# Patient Record
Sex: FEMALE | Race: BLACK OR AFRICAN AMERICAN | NOT HISPANIC OR LATINO | Employment: UNEMPLOYED | URBAN - METROPOLITAN AREA
[De-identification: names, ages, dates, MRNs, and addresses within clinical notes are randomized per-mention and may not be internally consistent; named-entity substitution may affect disease eponyms.]

---

## 2019-12-06 ENCOUNTER — LAB VISIT (OUTPATIENT)
Dept: LAB | Facility: OTHER | Age: 24
End: 2019-12-06
Attending: NURSE PRACTITIONER
Payer: MEDICAID

## 2019-12-06 ENCOUNTER — INITIAL PRENATAL (OUTPATIENT)
Dept: OBSTETRICS AND GYNECOLOGY | Facility: CLINIC | Age: 24
End: 2019-12-06
Payer: MEDICAID

## 2019-12-06 VITALS — WEIGHT: 175.94 LBS | SYSTOLIC BLOOD PRESSURE: 102 MMHG | BODY MASS INDEX: 30.2 KG/M2 | DIASTOLIC BLOOD PRESSURE: 70 MMHG

## 2019-12-06 DIAGNOSIS — Z34.82 ENCOUNTER FOR SUPERVISION OF OTHER NORMAL PREGNANCY, SECOND TRIMESTER: ICD-10-CM

## 2019-12-06 DIAGNOSIS — Z34.90 PREGNANCY, UNSPECIFIED GESTATIONAL AGE: ICD-10-CM

## 2019-12-06 DIAGNOSIS — O21.9 NAUSEA AND VOMITING IN PREGNANCY PRIOR TO 22 WEEKS GESTATION: ICD-10-CM

## 2019-12-06 DIAGNOSIS — Z98.891 PREVIOUS CESAREAN SECTION: Chronic | ICD-10-CM

## 2019-12-06 DIAGNOSIS — Z3A.18 18 WEEKS GESTATION OF PREGNANCY: Primary | ICD-10-CM

## 2019-12-06 DIAGNOSIS — Z3A.18 18 WEEKS GESTATION OF PREGNANCY: ICD-10-CM

## 2019-12-06 LAB
ABO + RH BLD: NORMAL
BASOPHILS # BLD AUTO: 0.03 K/UL (ref 0–0.2)
BASOPHILS NFR BLD: 0.3 % (ref 0–1.9)
BLD GP AB SCN CELLS X3 SERPL QL: NORMAL
DIFFERENTIAL METHOD: ABNORMAL
EOSINOPHIL # BLD AUTO: 0 K/UL (ref 0–0.5)
EOSINOPHIL NFR BLD: 0.3 % (ref 0–8)
ERYTHROCYTE [DISTWIDTH] IN BLOOD BY AUTOMATED COUNT: 19 % (ref 11.5–14.5)
GLUCOSE SERPL-MCNC: 73 MG/DL (ref 70–110)
HBV SURFACE AG SERPL QL IA: NEGATIVE
HCT VFR BLD AUTO: 30.5 % (ref 37–48.5)
HGB BLD-MCNC: 9.2 G/DL (ref 12–16)
HIV 1+2 AB+HIV1 P24 AG SERPL QL IA: NEGATIVE
IMM GRANULOCYTES # BLD AUTO: 0.04 K/UL (ref 0–0.04)
IMM GRANULOCYTES NFR BLD AUTO: 0.4 % (ref 0–0.5)
LYMPHOCYTES # BLD AUTO: 2.5 K/UL (ref 1–4.8)
LYMPHOCYTES NFR BLD: 24.3 % (ref 18–48)
MCH RBC QN AUTO: 23.6 PG (ref 27–31)
MCHC RBC AUTO-ENTMCNC: 30.2 G/DL (ref 32–36)
MCV RBC AUTO: 78 FL (ref 82–98)
MONOCYTES # BLD AUTO: 0.7 K/UL (ref 0.3–1)
MONOCYTES NFR BLD: 6.7 % (ref 4–15)
NEUTROPHILS # BLD AUTO: 7 K/UL (ref 1.8–7.7)
NEUTROPHILS NFR BLD: 68 % (ref 38–73)
NRBC BLD-RTO: 0 /100 WBC
PLATELET # BLD AUTO: 208 K/UL (ref 150–350)
PMV BLD AUTO: 12 FL (ref 9.2–12.9)
RBC # BLD AUTO: 3.9 M/UL (ref 4–5.4)
RH BLD: NORMAL
RPR SER QL: NORMAL
TSH SERPL DL<=0.005 MIU/L-ACNC: 1.9 UIU/ML (ref 0.4–4)
WBC # BLD AUTO: 10.27 K/UL (ref 3.9–12.7)

## 2019-12-06 PROCEDURE — 87086 URINE CULTURE/COLONY COUNT: CPT

## 2019-12-06 PROCEDURE — 85025 COMPLETE CBC W/AUTO DIFF WBC: CPT

## 2019-12-06 PROCEDURE — 86592 SYPHILIS TEST NON-TREP QUAL: CPT

## 2019-12-06 PROCEDURE — 86762 RUBELLA ANTIBODY: CPT

## 2019-12-06 PROCEDURE — 86703 HIV-1/HIV-2 1 RESULT ANTBDY: CPT

## 2019-12-06 PROCEDURE — 99202 OFFICE O/P NEW SF 15 MIN: CPT | Mod: PBBFAC,TH,25 | Performed by: NURSE PRACTITIONER

## 2019-12-06 PROCEDURE — 99202 PR OFFICE/OUTPT VISIT, NEW, LEVL II, 15-29 MIN: ICD-10-PCS | Mod: TH,S$PBB,, | Performed by: NURSE PRACTITIONER

## 2019-12-06 PROCEDURE — 82947 ASSAY GLUCOSE BLOOD QUANT: CPT

## 2019-12-06 PROCEDURE — 99999 PR PBB SHADOW E&M-NEW PATIENT-LVL II: ICD-10-PCS | Mod: PBBFAC,,, | Performed by: NURSE PRACTITIONER

## 2019-12-06 PROCEDURE — 84443 ASSAY THYROID STIM HORMONE: CPT

## 2019-12-06 PROCEDURE — 99202 OFFICE O/P NEW SF 15 MIN: CPT | Mod: TH,S$PBB,, | Performed by: NURSE PRACTITIONER

## 2019-12-06 PROCEDURE — 99999 PR PBB SHADOW E&M-NEW PATIENT-LVL II: CPT | Mod: PBBFAC,,, | Performed by: NURSE PRACTITIONER

## 2019-12-06 PROCEDURE — 86901 BLOOD TYPING SEROLOGIC RH(D): CPT

## 2019-12-06 PROCEDURE — 87491 CHLMYD TRACH DNA AMP PROBE: CPT

## 2019-12-06 PROCEDURE — 87340 HEPATITIS B SURFACE AG IA: CPT

## 2019-12-06 PROCEDURE — 86901 BLOOD TYPING SEROLOGIC RH(D): CPT | Mod: 91

## 2019-12-06 PROCEDURE — 36415 COLL VENOUS BLD VENIPUNCTURE: CPT

## 2019-12-06 RX ORDER — ONDANSETRON 4 MG/1
TABLET, ORALLY DISINTEGRATING ORAL
Refills: 0 | COMMUNITY
Start: 2019-10-18 | End: 2019-12-06 | Stop reason: SDUPTHER

## 2019-12-06 RX ORDER — ONDANSETRON 4 MG/1
TABLET, ORALLY DISINTEGRATING ORAL
Qty: 30 TABLET | Refills: 1 | Status: SHIPPED | OUTPATIENT
Start: 2019-12-06 | End: 2020-03-17

## 2019-12-06 NOTE — PROGRESS NOTES
Presents for first visit this pregnancy - pt of 's.  OB records provided, but burks not include initial OB labs - spoke w/ 's RN Nyla, and she states pt was given orders for labs twice, but pt never got labs done.  We provided pt with Initial OB lab orders today.    Requested refill of Zofran today.  Prev c/s - interested in discussing TOLAC with MD.  [facility] Anatomy scan orders placed today.

## 2019-12-07 LAB
C TRACH DNA SPEC QL NAA+PROBE: NOT DETECTED
N GONORRHOEA DNA SPEC QL NAA+PROBE: NOT DETECTED

## 2019-12-08 LAB — BACTERIA UR CULT: NORMAL

## 2019-12-09 LAB
RUBV IGG SER-ACNC: 44.4 IU/ML
RUBV IGG SER-IMP: REACTIVE

## 2019-12-10 ENCOUNTER — HOSPITAL ENCOUNTER (OUTPATIENT)
Facility: OTHER | Age: 24
LOS: 1 days | Discharge: HOME | End: 2019-12-10
Attending: OBSTETRICS & GYNECOLOGY | Admitting: OBSTETRICS & GYNECOLOGY
Payer: MEDICAID

## 2019-12-10 ENCOUNTER — ANESTHESIA (OUTPATIENT)
Dept: OBSTETRICS AND GYNECOLOGY | Facility: OTHER | Age: 24
End: 2019-12-10

## 2019-12-10 ENCOUNTER — ANESTHESIA EVENT (OUTPATIENT)
Dept: OBSTETRICS AND GYNECOLOGY | Facility: OTHER | Age: 24
End: 2019-12-10

## 2019-12-10 ENCOUNTER — HOSPITAL ENCOUNTER (EMERGENCY)
Facility: HOSPITAL | Age: 24
Discharge: STILL ADMITTED OR ADMINISTRATIVE | End: 2019-12-10
Attending: EMERGENCY MEDICINE
Payer: MEDICAID

## 2019-12-10 VITALS
WEIGHT: 170 LBS | OXYGEN SATURATION: 100 % | HEART RATE: 92 BPM | SYSTOLIC BLOOD PRESSURE: 120 MMHG | BODY MASS INDEX: 29.02 KG/M2 | RESPIRATION RATE: 16 BRPM | HEIGHT: 64 IN | TEMPERATURE: 98 F | DIASTOLIC BLOOD PRESSURE: 59 MMHG

## 2019-12-10 VITALS
SYSTOLIC BLOOD PRESSURE: 117 MMHG | DIASTOLIC BLOOD PRESSURE: 53 MMHG | TEMPERATURE: 99 F | OXYGEN SATURATION: 100 % | RESPIRATION RATE: 17 BRPM | HEART RATE: 81 BPM

## 2019-12-10 DIAGNOSIS — O03.9 SPONTANEOUS PREGNANCY LOSS: Primary | ICD-10-CM

## 2019-12-10 DIAGNOSIS — O03.9 SPONTANEOUS MISCARRIAGE: ICD-10-CM

## 2019-12-10 PROBLEM — Z67.91 RH NEGATIVE STATUS DURING PREGNANCY IN SECOND TRIMESTER: Status: ACTIVE | Noted: 2019-12-10

## 2019-12-10 PROBLEM — O26.892 RH NEGATIVE STATUS DURING PREGNANCY IN SECOND TRIMESTER: Status: ACTIVE | Noted: 2019-12-10

## 2019-12-10 LAB
ABO + RH BLD: NORMAL
ABO + RH BLD: NORMAL
ALBUMIN SERPL BCP-MCNC: 3.1 G/DL (ref 3.5–5.2)
ALP SERPL-CCNC: 89 U/L (ref 55–135)
ALT SERPL W/O P-5'-P-CCNC: 8 U/L (ref 10–44)
ANION GAP SERPL CALC-SCNC: 14 MMOL/L (ref 8–16)
APTT BLDCRRT: 23.4 SEC (ref 21–32)
AST SERPL-CCNC: 29 U/L (ref 10–40)
BASOPHILS # BLD AUTO: 0.04 K/UL (ref 0–0.2)
BASOPHILS NFR BLD: 0.3 % (ref 0–1.9)
BILIRUB SERPL-MCNC: 0.3 MG/DL (ref 0.1–1)
BLD GP AB SCN CELLS X3 SERPL QL: NORMAL
BLD GP AB SCN CELLS X3 SERPL QL: NORMAL
BUN SERPL-MCNC: 4 MG/DL (ref 6–20)
CALCIUM SERPL-MCNC: 8.8 MG/DL (ref 8.7–10.5)
CHLORIDE SERPL-SCNC: 106 MMOL/L (ref 95–110)
CO2 SERPL-SCNC: 17 MMOL/L (ref 23–29)
CREAT SERPL-MCNC: 0.6 MG/DL (ref 0.5–1.4)
DIFFERENTIAL METHOD: ABNORMAL
EOSINOPHIL # BLD AUTO: 0 K/UL (ref 0–0.5)
EOSINOPHIL NFR BLD: 0.1 % (ref 0–8)
ERYTHROCYTE [DISTWIDTH] IN BLOOD BY AUTOMATED COUNT: 18.7 % (ref 11.5–14.5)
EST. GFR  (AFRICAN AMERICAN): >60 ML/MIN/1.73 M^2
EST. GFR  (NON AFRICAN AMERICAN): >60 ML/MIN/1.73 M^2
FETAL CELL SCN BLD QL ROSETTE: NORMAL
GLUCOSE SERPL-MCNC: 108 MG/DL (ref 70–110)
HCT VFR BLD AUTO: 31.9 % (ref 37–48.5)
HGB BLD-MCNC: 10 G/DL (ref 12–16)
IMM GRANULOCYTES # BLD AUTO: 0.09 K/UL (ref 0–0.04)
IMM GRANULOCYTES NFR BLD AUTO: 0.6 % (ref 0–0.5)
INJECT RH IG VOL PATIENT: NORMAL ML
LYMPHOCYTES # BLD AUTO: 4.1 K/UL (ref 1–4.8)
LYMPHOCYTES NFR BLD: 27.2 % (ref 18–48)
MCH RBC QN AUTO: 24.9 PG (ref 27–31)
MCHC RBC AUTO-ENTMCNC: 31.3 G/DL (ref 32–36)
MCV RBC AUTO: 80 FL (ref 82–98)
MONOCYTES # BLD AUTO: 1.2 K/UL (ref 0.3–1)
MONOCYTES NFR BLD: 8.1 % (ref 4–15)
NEUTROPHILS # BLD AUTO: 9.6 K/UL (ref 1.8–7.7)
NEUTROPHILS NFR BLD: 63.7 % (ref 38–73)
NRBC BLD-RTO: 0 /100 WBC
PLATELET # BLD AUTO: 194 K/UL (ref 150–350)
PMV BLD AUTO: 11.2 FL (ref 9.2–12.9)
POTASSIUM SERPL-SCNC: 4 MMOL/L (ref 3.5–5.1)
PROT SERPL-MCNC: 7.9 G/DL (ref 6–8.4)
RBC # BLD AUTO: 4.01 M/UL (ref 4–5.4)
SODIUM SERPL-SCNC: 137 MMOL/L (ref 136–145)
WBC # BLD AUTO: 14.99 K/UL (ref 3.9–12.7)

## 2019-12-10 PROCEDURE — 63600175 PHARM REV CODE 636 W HCPCS: Performed by: STUDENT IN AN ORGANIZED HEALTH CARE EDUCATION/TRAINING PROGRAM

## 2019-12-10 PROCEDURE — 96372 THER/PROPH/DIAG INJ SC/IM: CPT

## 2019-12-10 PROCEDURE — 63600519 RHOGAM PHARM REV CODE 636 ALT 250 W HCPCS: Performed by: STUDENT IN AN ORGANIZED HEALTH CARE EDUCATION/TRAINING PROGRAM

## 2019-12-10 PROCEDURE — 99291 CRITICAL CARE FIRST HOUR: CPT | Mod: 25

## 2019-12-10 PROCEDURE — 36415 COLL VENOUS BLD VENIPUNCTURE: CPT

## 2019-12-10 PROCEDURE — 99291 CRITICAL CARE FIRST HOUR: CPT | Mod: ,,, | Performed by: EMERGENCY MEDICINE

## 2019-12-10 PROCEDURE — 86901 BLOOD TYPING SEROLOGIC RH(D): CPT

## 2019-12-10 PROCEDURE — 85461 HEMOGLOBIN FETAL: CPT

## 2019-12-10 PROCEDURE — 85730 THROMBOPLASTIN TIME PARTIAL: CPT

## 2019-12-10 PROCEDURE — 86850 RBC ANTIBODY SCREEN: CPT | Mod: 91

## 2019-12-10 PROCEDURE — 85025 COMPLETE CBC W/AUTO DIFF WBC: CPT

## 2019-12-10 PROCEDURE — G0378 HOSPITAL OBSERVATION PER HR: HCPCS

## 2019-12-10 PROCEDURE — 96376 TX/PRO/DX INJ SAME DRUG ADON: CPT

## 2019-12-10 PROCEDURE — 99291 PR CRITICAL CARE, E/M 30-74 MINUTES: ICD-10-PCS | Mod: ,,, | Performed by: EMERGENCY MEDICINE

## 2019-12-10 PROCEDURE — 96374 THER/PROPH/DIAG INJ IV PUSH: CPT

## 2019-12-10 PROCEDURE — 80053 COMPREHEN METABOLIC PANEL: CPT

## 2019-12-10 PROCEDURE — 96372 THER/PROPH/DIAG INJ SC/IM: CPT | Mod: 59

## 2019-12-10 PROCEDURE — 96375 TX/PRO/DX INJ NEW DRUG ADDON: CPT

## 2019-12-10 RX ORDER — SIMETHICONE 80 MG
1 TABLET,CHEWABLE ORAL EVERY 6 HOURS PRN
Status: DISCONTINUED | OUTPATIENT
Start: 2019-12-10 | End: 2019-12-10 | Stop reason: HOSPADM

## 2019-12-10 RX ORDER — AMOXICILLIN 250 MG
1 CAPSULE ORAL NIGHTLY PRN
Status: DISCONTINUED | OUTPATIENT
Start: 2019-12-10 | End: 2019-12-10 | Stop reason: HOSPADM

## 2019-12-10 RX ORDER — METHYLERGONOVINE MALEATE 0.2 MG/ML
200 INJECTION INTRAVENOUS ONCE
Status: COMPLETED | OUTPATIENT
Start: 2019-12-10 | End: 2019-12-10

## 2019-12-10 RX ORDER — FENTANYL CITRATE 50 UG/ML
INJECTION, SOLUTION INTRAMUSCULAR; INTRAVENOUS
Status: DISCONTINUED
Start: 2019-12-10 | End: 2019-12-10 | Stop reason: HOSPADM

## 2019-12-10 RX ORDER — FENTANYL CITRATE 50 UG/ML
50 INJECTION, SOLUTION INTRAMUSCULAR; INTRAVENOUS
Status: COMPLETED | OUTPATIENT
Start: 2019-12-10 | End: 2019-12-10

## 2019-12-10 RX ORDER — IBUPROFEN 600 MG/1
600 TABLET ORAL EVERY 6 HOURS PRN
Status: DISCONTINUED | OUTPATIENT
Start: 2019-12-10 | End: 2019-12-10 | Stop reason: HOSPADM

## 2019-12-10 RX ORDER — PRENATAL WITH FERROUS FUM AND FOLIC ACID 3080; 920; 120; 400; 22; 1.84; 3; 20; 10; 1; 12; 200; 27; 25; 2 [IU]/1; [IU]/1; MG/1; [IU]/1; MG/1; MG/1; MG/1; MG/1; MG/1; MG/1; UG/1; MG/1; MG/1; MG/1; MG/1
1 TABLET ORAL DAILY
Status: DISCONTINUED | OUTPATIENT
Start: 2019-12-11 | End: 2019-12-10 | Stop reason: HOSPADM

## 2019-12-10 RX ORDER — HYDROCODONE BITARTRATE AND ACETAMINOPHEN 10; 325 MG/1; MG/1
1 TABLET ORAL EVERY 4 HOURS PRN
Status: DISCONTINUED | OUTPATIENT
Start: 2019-12-10 | End: 2019-12-10 | Stop reason: HOSPADM

## 2019-12-10 RX ORDER — ACETAMINOPHEN 325 MG/1
650 TABLET ORAL EVERY 6 HOURS PRN
Status: DISCONTINUED | OUTPATIENT
Start: 2019-12-10 | End: 2019-12-10 | Stop reason: HOSPADM

## 2019-12-10 RX ORDER — DIPHENHYDRAMINE HYDROCHLORIDE 50 MG/ML
25 INJECTION INTRAMUSCULAR; INTRAVENOUS EVERY 4 HOURS PRN
Status: DISCONTINUED | OUTPATIENT
Start: 2019-12-10 | End: 2019-12-10 | Stop reason: HOSPADM

## 2019-12-10 RX ORDER — DIPHENHYDRAMINE HCL 25 MG
25 CAPSULE ORAL EVERY 4 HOURS PRN
Status: DISCONTINUED | OUTPATIENT
Start: 2019-12-10 | End: 2019-12-10 | Stop reason: HOSPADM

## 2019-12-10 RX ORDER — ONDANSETRON 8 MG/1
8 TABLET, ORALLY DISINTEGRATING ORAL EVERY 8 HOURS PRN
Status: DISCONTINUED | OUTPATIENT
Start: 2019-12-10 | End: 2019-12-10 | Stop reason: HOSPADM

## 2019-12-10 RX ORDER — MORPHINE SULFATE 4 MG/ML
4 INJECTION, SOLUTION INTRAMUSCULAR; INTRAVENOUS
Status: COMPLETED | OUTPATIENT
Start: 2019-12-10 | End: 2019-12-10

## 2019-12-10 RX ORDER — OXYTOCIN 10 [USP'U]/ML
10 INJECTION, SOLUTION INTRAMUSCULAR; INTRAVENOUS ONCE
Status: DISCONTINUED | OUTPATIENT
Start: 2019-12-10 | End: 2019-12-10 | Stop reason: HOSPADM

## 2019-12-10 RX ORDER — HYDROCODONE BITARTRATE AND ACETAMINOPHEN 5; 325 MG/1; MG/1
1 TABLET ORAL EVERY 4 HOURS PRN
Status: DISCONTINUED | OUTPATIENT
Start: 2019-12-10 | End: 2019-12-10 | Stop reason: HOSPADM

## 2019-12-10 RX ORDER — IBUPROFEN 600 MG/1
600 TABLET ORAL EVERY 6 HOURS PRN
Qty: 30 TABLET | Refills: 0 | Status: SHIPPED | OUTPATIENT
Start: 2019-12-10 | End: 2020-03-17

## 2019-12-10 RX ORDER — SODIUM CHLORIDE 9 MG/ML
1000 INJECTION, SOLUTION INTRAVENOUS
Status: COMPLETED | OUTPATIENT
Start: 2019-12-10 | End: 2019-12-10

## 2019-12-10 RX ORDER — ONDANSETRON 2 MG/ML
4 INJECTION INTRAMUSCULAR; INTRAVENOUS
Status: COMPLETED | OUTPATIENT
Start: 2019-12-10 | End: 2019-12-10

## 2019-12-10 RX ADMIN — FENTANYL CITRATE 50 MCG: 50 INJECTION INTRAMUSCULAR; INTRAVENOUS at 11:12

## 2019-12-10 RX ADMIN — ONDANSETRON 4 MG: 2 INJECTION INTRAMUSCULAR; INTRAVENOUS at 01:12

## 2019-12-10 RX ADMIN — MORPHINE SULFATE 4 MG: 4 INJECTION, SOLUTION INTRAMUSCULAR; INTRAVENOUS at 01:12

## 2019-12-10 RX ADMIN — METHYLERGONOVINE MALEATE 200 MCG: 0.2 INJECTION, SOLUTION INTRAMUSCULAR; INTRAVENOUS at 12:12

## 2019-12-10 RX ADMIN — FENTANYL CITRATE 50 MCG: 50 INJECTION INTRAMUSCULAR; INTRAVENOUS at 12:12

## 2019-12-10 RX ADMIN — SODIUM CHLORIDE 1000 ML: 0.9 INJECTION, SOLUTION INTRAVENOUS at 11:12

## 2019-12-10 RX ADMIN — HUMAN RHO(D) IMMUNE GLOBULIN 300 MCG: 300 INJECTION, SOLUTION INTRAMUSCULAR at 06:12

## 2019-12-10 NOTE — ED NOTES
Pt transferred to [facility] per [facility].  remains at bedside with baby, awaiting body bag from floor.

## 2019-12-10 NOTE — ED NOTES
Pt having bloody vaginal discharge, OB doctor and ED doctor at bedside. Pt on hands and knees on stretcher,

## 2019-12-10 NOTE — CONSULTS
[HPI: Pt is a 24 y.o.  who presented to the ER with complaint of contractions q2 mins. Immediately prior to presentation, pt reports LOF. Had established PNC with Dr. Gregory.]    Ob history:  G1: C/s @ term for NRFHT  G2: elective AB @ approx 5 weeks  G3: current    MD called to bedside by ER physician for PPROM at 19 weeks (dating by LMP) and pt complaining of ctx q2 mins. Pt in distress. Moderate amount of vaginal bleeding. ER physician performing beside US, no FHT noted on doppler although difficult to visualize fetus. BME revealed fetal parts in vaginal canal. Pt delivered fetus after 2 pushes. Male sex noted. Umbilical cord clamped and cut. Placenta delivered with fundal massage and one maternal push. Methergine x1 given with minimal bleeding noted. Fundus firm. EBL 500cc    Vitals:    12/10/19 1215   BP: 135/75   Pulse: 86   Resp: 20   Temp:        - CBC, T&S, coags ordered, pt B neg, will need Rhogam  - bleeding stable following delivery, VSS  - fentanyl administered per ED staff with good pain control noted  - discussed with L&D team at [facility], will transfer for further management  - pt counseled on option for autopsy/cytogenetics, declines due to cost    Leydi Schultz MD   OBGYN, PGY-2

## 2019-12-10 NOTE — ED NOTES
OB doctor continues at bedside, pt pushing at this time. Pt delivered fetus prematurely, no heart beat. Mother wishes to hold baby at this time. Umbilical cord managed by OB doctor. Baby wrapped in blanket in basin at bedside.

## 2019-12-10 NOTE — DISCHARGE INSTRUCTIONS
Call your MD with vaginal bleeding greater than 1pad>hr X 2 hours, temperature greater than 100.4, severe abd pain unrelieved by tylenol or any unusually problems.  Keep next appointment as scheduled with MD.  Pelvic rest until MD states otherwise.  Pelvic rest means no sex, no tampons or douching.  Nothing entered into the vagina.  Follow up with your doctor in one week.   Take tylenol or ibuprofen for discomfort

## 2019-12-10 NOTE — ED PROVIDER NOTES
Encounter Date: 12/10/2019       History     Chief Complaint   Patient presents with    Contractions     19 weeks pregnant. Dr. Natalya Melgar.  1 previous pregnancy- csection-healthy baby. Taking zofran. Hx of asthma.      HPI     Yamel Toribio is a 24 y.o. female , with history of asthma, currently 19 weeks pregnant, her OBGYN is Dr. Natalya Melgar, presents for abdominal cramping and contractions.  Prior pregnancy was without any complication,  delivery.  Last saw her OBGYN a week ago with no complication with this pregnancy.  She was at work today, had severe cramping and felt contractions every 2-5 minutes so she went to the hospital.  On arrival, her water broke and she was taken to the room.  Contractions every 2 min.    Review of patient's allergies indicates:  No Known Allergies  Past Medical History:   Diagnosis Date    Asthma      Past Surgical History:   Procedure Laterality Date     SECTION  2018    [facility] (FTP & NRFHT's)     Family History   Problem Relation Age of Onset    No Known Problems Father     No Known Problems Mother     No Known Problems Brother      Social History     Tobacco Use    Smoking status: Former Smoker    Smokeless tobacco: Never Used   Substance Use Topics    Alcohol use: No    Drug use: No     Review of Systems   Unable to perform ROS: Acuity of condition   Constitutional: Negative for fever.   Gastrointestinal: Positive for abdominal pain.       Physical Exam     Initial Vitals   BP Pulse Resp Temp SpO2   12/10/19 1153 12/10/19 1153 12/10/19 1153 12/10/19 1153 12/10/19 1200   133/62 94 20 97.6 °F (36.4 °C) 99 %      MAP       --                Physical Exam    Nursing note and vitals reviewed.  Constitutional: Vital signs are normal. She appears well-developed and well-nourished. She is not diaphoretic. She is cooperative. No distress.   HENT:   Head: Normocephalic and atraumatic.   Eyes: Conjunctivae and EOM are normal.   Neck: Normal  range of motion. Neck supple.   Cardiovascular: Normal rate and normal heart sounds.   Pulmonary/Chest: Effort normal. No tachypnea. No respiratory distress.   Abdominal: Soft.   Tender to palpation.   Genitourinary:   Genitourinary Comments: On initial external exam, no presenting fetal part, no bleeding but does have some clear vaginal fluid.   Musculoskeletal: Normal range of motion. She exhibits no edema or tenderness.   Neurological: She is alert and oriented to person, place, and time.   Skin: Capillary refill takes less than 2 seconds.         ED Course   Critical Care  Date/Time: 12/10/2019 12:01 PM  Performed by: Arnold Manzano MD  Authorized by: Arnold Manzano MD   Direct patient critical care time: 20 minutes  Additional history critical care time: 5 minutes  Ordering / reviewing critical care time: 10 minutes  Documentation critical care time: 10 minutes  Consulting other physicians critical care time: 15 minutes  Total critical care time (exclusive of procedural time) : 60 minutes  Critical care time was exclusive of teaching time.  Critical care was necessary to treat or prevent imminent or life-threatening deterioration of the following conditions: emminent delivery.  Critical care was time spent personally by me on the following activities: blood draw for specimens, development of treatment plan with patient or surrogate, discussions with consultants, interpretation of cardiac output measurements, evaluation of patient's response to treatment, examination of patient, obtaining history from patient or surrogate, ordering and performing treatments and interventions, ordering and review of laboratory studies, pulse oximetry, re-evaluation of patient's condition and review of old charts.        Labs Reviewed   CBC W/ AUTO DIFFERENTIAL - Abnormal; Notable for the following components:       Result Value    WBC 14.99 (*)     Hemoglobin 10.0 (*)     Hematocrit 31.9 (*)     Mean Corpuscular  Volume 80 (*)     Mean Corpuscular Hemoglobin 24.9 (*)     Mean Corpuscular Hemoglobin Conc 31.3 (*)     RDW 18.7 (*)     Immature Granulocytes 0.6 (*)     Gran # (ANC) 9.6 (*)     Immature Grans (Abs) 0.09 (*)     Mono # 1.2 (*)     All other components within normal limits   COMPREHENSIVE METABOLIC PANEL - Abnormal; Notable for the following components:    CO2 17 (*)     BUN, Bld 4 (*)     Albumin 3.1 (*)     ALT 8 (*)     All other components within normal limits   APTT   TYPE & SCREEN               Medical Decision Making:   History:   Old Medical Records: I decided to obtain old medical records.  Clinical Tests:   Lab Tests: Ordered and Reviewed  Other:   I have discussed this case with another health care provider.       APC / Resident Notes:       Yamel Toribio is a 24 y.o. female w/ hx of asthma, currently 19 weeks pregnant, her OBGYN is Dr. Natalya Melgar, presents for abdominal cramping and contractions.  OBGYN was urgently consulted and came to see patient immediately.  Bedside ultrasound showing fetus in the pelvis, with only flickering heartbeat, unable to measure.  On digital exam, has palpable fetal part in the vaginal canal.    Patient subsequently had contractions every 2 min, and deliver fetus vaginally.  Fetus was without heartbeat or breathing.  Eyelids were fused. Umbilical cord was clamped at 2 places and cut. Fundal massage was applied and patient subsequently delivered the placenta at after 15 min.  Placenta was examined and appeared to be intact. No subsequent vaginal bleeding after placental delivery.  Estimated total blood loss about 150-200cc.    The patient was given total 100 micro fentanyl during delivery for pain. She also received IV fluid bolus.  Methergine was also ordered to help with bleeding.    On re-evaluation, pain is well control and no more bleeding.    Patient will be transferred to [facility] for further monitor.  --- Lizett Santo MD -- PGY3 EM -- 12/10/2019 12:41 PM  ---          Attending Attestation:   Physician Attestation Statement for Resident:  As the supervising MD   Physician Attestation Statement: I have personally seen and examined this patient.   I agree with the above history. -: 24-year-old woman  at 19 weeks gestational age presents for evaluation of vaginal leakage as well as increasing frequency of pelvic contractions   As the supervising MD I agree with the above PE.    As the supervising MD I agree with the above treatment, course, plan, and disposition.  I was personally present during the entire procedure.  I have reviewed and agree with the residents interpretation of the following: lab data.  I have reviewed the following: old records at this facility.                                  Clinical Impression:       ICD-10-CM ICD-9-CM   1. Vaginal delivery O80 650   2. Spontaneous miscarriage O03.9 634.90         Disposition:   Disposition: Transferred  Condition: Fair  [facility]                     Lizett Santo MD  Resident  12/10/19 1247       Lizett Santo MD  Resident  12/10/19 1247       Arnold Manzano MD  19 1114

## 2019-12-10 NOTE — PROGRESS NOTES
Child Life team consulted by Nicky sprague. CCLS met with pt at bedside and offered hand prints for the baby. Mom in agreement and hand prints were done at bedside.   Child Life will continue to follow as needed.     LEANA Estrada  [id number]

## 2019-12-10 NOTE — ED NOTES
Update called to Malu at [facility], pt to go to 6th floor and registration will tell [facility] room number, [facility] aware and preparing for transfer.  continues at bedside to help pt with arrangements.

## 2019-12-10 NOTE — ED TRIAGE NOTES
Pt presents with c/o contractions, water broke just pta. Pt is approx 19 wks preg. Previous hx of c-sections. GCS15. Pt reports having contractions approx every 2 mins.

## 2019-12-10 NOTE — NURSING
Pt arrived via ems from [facility]  After delivery on non viable infant.   Minimal bleeding noted   Pt reports no pain at this time  Dr nora tena

## 2019-12-10 NOTE — H&P
[facility]  Obstetrics & Gynecology  History & Physical    Patient Name: Yamel Toribio  MRN: [id number]  Admission Date: 12/10/2019  Primary Care Provider: Eddie Brito MD    Subjective:     Chief Complaint/Reason for Admission: Spontaneous pregnancy loss    History of Present Illness:   Yamel Toribio is a 24 y.o.  who presented to the [facility] ED with abdominal cramping. She was 18w5d pregnant and had received her PNC with Dr. Charles at [facility]. She had recently transferred care to [facility] and was scheduled to see Dr. Resendiz. The pregnancy had been uncomplicated thus far. Today she noted abdominal pain that was coming and going every 2-5 minutes. She presented to the ED where shortly after arrival, her water broke.    She was brought to an exam room and OB was urgently called. With an OB resident at bedside, she spontaneously delivered a nonviable male infant. The placenta delivered about 15 minutes later and appeared intact. Bleeding was minimal but 0.2 mg of IM methergine was given prophlyactically. She was transferred to [facility] [place] for evaluation by OB staff.    On arrival to [facility] [place], she has no complaints. Denies abdominal pain, vaginal bleeding. No fever, chills, nausea, or vomiting. She reports that she is Rh negative and did not receive rhogam at the outside hospital. Her only other medical problem is a history of asthma.      Current Facility-Administered Medications on File Prior to Encounter   Medication    [COMPLETED] 0.9%  NaCl infusion    [COMPLETED] fentaNYL injection 50 mcg    [COMPLETED] fentaNYL injection 50 mcg    [COMPLETED] methylergonovine injection 200 mcg    [COMPLETED] morphine injection 4 mg    [COMPLETED] ondansetron injection 4 mg    [DISCONTINUED] oxytocin injection 10 Units     Current Outpatient Medications on File Prior to Encounter   Medication Sig    FERRALET 90 DUAL-IRON DELIVERY 90-1-12-50 mg-mg-mcg-mg Tab TK 1 T PO QD    ondansetron (ZOFRAN-ODT) 4  MG TbDL One by mouth every 8 hrs as needed for n/v       Review of patient's allergies indicates:  No Known Allergies    Past Medical History:   Diagnosis Date    Asthma      OB History    Para Term  AB Living   3 1 1   1 1   SAB TAB Ectopic Multiple Live Births     1   1 1      # Outcome Date GA Lbr Lewis/2nd Weight Sex Delivery Anes PTL Lv   3A             3B Current            2 TAB            1 Term      CS-LTranv   TAHMINA     Past Surgical History:   Procedure Laterality Date     SECTION  2018    [facility] (FTP & NRFHT's)     Family History     Problem Relation (Age of Onset)    No Known Problems Father, Mother, Brother        Tobacco Use    Smoking status: Former Smoker    Smokeless tobacco: Never Used   Substance and Sexual Activity    Alcohol use: No    Drug use: No    Sexual activity: Yes     Review of Systems   Constitutional: Negative for chills and fever.   Respiratory: Negative for shortness of breath.    Cardiovascular: Negative for chest pain.   Gastrointestinal: Negative for abdominal pain, constipation, diarrhea, nausea and vomiting.   Genitourinary: Negative for dysuria, pelvic pain, vaginal bleeding and vaginal odor.   Neurological: Negative for headaches.   Psychiatric/Behavioral: Negative for depression.     Objective:     Vital Signs (Most Recent):  Temp: 98.6 °F (37 °C) (12/10/19 1449)  Pulse: 81 (12/10/19 1449)  Resp: 17 (12/10/19 1449)  BP: (!) 117/53 (12/10/19 1449)  SpO2: 100 % (12/10/19 1449) Vital Signs (24h Range):  Temp:  [97.6 °F (36.4 °C)-98.6 °F (37 °C)] 98.6 °F (37 °C)  Pulse:  [] 81  Resp:  [12-] 17  SpO2:  [99 %-100 %] 100 %  BP: (107-144)/(53-79) 117/53        There is no height or weight on file to calculate BMI.  Patient's last menstrual period was 2019.    Physical Exam:   Constitutional: She is oriented to person, place, and time. She appears well-developed and well-nourished. No distress.    HENT:   Head: Normocephalic and  atraumatic.    Eyes: Conjunctivae and EOM are normal.    Neck: Normal range of motion. Neck supple.    Cardiovascular: Normal rate and regular rhythm.     Pulmonary/Chest: Effort normal. No respiratory distress.        Abdominal: Soft. She exhibits no distension. There is no tenderness. There is no rebound and no guarding.             Musculoskeletal: Moves all extremeties. She exhibits no edema or tenderness.       Neurological: She is alert and oriented to person, place, and time.    Skin: Skin is warm and dry. No rash noted. She is not diaphoretic.    Psychiatric: She has a normal mood and affect. Her behavior is normal.       Laboratory:  CBC:   Recent Labs   Lab 12/10/19  1201   WBC 14.99*   RBC 4.01   HGB 10.0*   HCT 31.9*      MCV 80*   MCH 24.9*   MCHC 31.3*     Coagulation:   Recent Labs   Lab 12/10/19  1201   APTT 23.4       Assessment/Plan:     Active Diagnoses:    Diagnosis Date Noted POA    PRINCIPAL PROBLEM:  Spontaneous pregnancy loss [O03.9] 12/10/2019 Unknown    Rh negative status during pregnancy in second trimester [O26.892, Z67.91] 12/10/2019 Unknown      Problems Resolved During this Admission:       -- Patient with no complaints. No abdominal pain or heavy vaginal bleeding.  -- Regular diet.  -- Ibuprofen, norco for pain.  -- Rhogam prior to discharge.  -- Follow up with GYN PRN as an outpatient.      Loren Escobar MD  Obstetrics & Gynecology  [facility]    I have reviewed the resident's note and agree with the diagnosis and management plan

## 2019-12-10 NOTE — DISCHARGE SUMMARY
[facility]  Obstetrics & Gynecology  Discharge Summary    Patient Name: Yamel Toribio  MRN: [id number]  Admission Date: 12/10/2019  Hospital Length of Stay: 1 days  Discharge Date and Time:  12/10/2019 4:56 PM  Attending Physician: Miguel Otto III, MD   Discharging Provider: Loren Escobar MD  Primary Care Provider: Eddie Brito MD    HPI: Yamel Toribio is a 24 y.o.  who presented to the [facility] ED with abdominal cramping. She was 18w5d pregnant and had received her PNC with Dr. Charles at [facility]. She had recently transferred care to [facility] and was scheduled to see Dr. Resendiz. The pregnancy had been uncomplicated thus far. Today she noted abdominal pain that was coming and going every 2-5 minutes. She presented to the ED where shortly after arrival, her water broke.     She was brought to an exam room and OB was urgently called. With an OB resident at bedside, she spontaneously delivered a nonviable male infant. The placenta delivered about 15 minutes later and appeared intact. Bleeding was minimal but 0.2 mg of IM methergine was given prophlyactically. She was transferred to [facility] for evaluation by OB staff.     On arrival to [facility], she has no complaints. Denies abdominal pain, vaginal bleeding. No fever, chills, nausea, or vomiting. She reports that she is Rh negative and did not receive rhogam at the outside hospital. Her only other medical problem is a history of asthma.    Hospital Course:   Patient evaluated and found to be stable. Rhogam given. Follow up with primary OBGYN in 2-4 weeks.    * No surgery found *     Pending Diagnostic Studies:     None        Final Active Diagnoses:    Diagnosis Date Noted POA    PRINCIPAL PROBLEM:  Spontaneous pregnancy loss [O03.9] 12/10/2019 Yes    Rh negative status during pregnancy in second trimester [O26.892, Z67.91] 12/10/2019 Yes      Problems Resolved During this Admission:        Discharged Condition: good    Disposition: Home  or Self Care    Follow Up:  Follow-up Information     Natalya Resendiz MD. Schedule an appointment as soon as possible for a visit in 4 weeks.    Specialty:  Obstetrics and Gynecology  Why:  Follow up  Contact information:  [address]  [address]  [facility] [id number] [phone]                 Patient Instructions:      Diet Adult Regular     Pelvic Rest   Order Comments: Pelvic rest for at least 2 weeks. Nothing in vagina - no sex, tampons, or douching for 2 weeks     Notify your health care provider if you experience any of the following:   Order Comments: Heavy vaginal bleeding saturating more than 1 pad per hour for at least 2 hours.     Notify your health care provider if you experience any of the following:  increased confusion or weakness     Notify your health care provider if you experience any of the following:  persistent dizziness, light-headedness, or visual disturbances     Notify your health care provider if you experience any of the following:  severe persistent headache     Notify your health care provider if you experience any of the following:  difficulty breathing or increased cough     Notify your health care provider if you experience any of the following:  temperature >100.4     Notify your health care provider if you experience any of the following:  persistent nausea and vomiting or diarrhea     Notify your health care provider if you experience any of the following:  severe uncontrolled pain     Activity as tolerated     Medications:  Reconciled Home Medications:      Medication List      START taking these medications    ibuprofen 600 MG tablet  Commonly known as:  ADVIL,MOTRIN  Take 1 tablet (600 mg total) by mouth every 6 (six) hours as needed.        CONTINUE taking these medications    Ferralet 90 Dual-Iron Delivery 90-1-12-50 mg-mg-mcg-mg Tab  Generic drug:  iron,carbon,gluc-FA-B12-C-dss  TK 1 T PO QD     ondansetron 4 MG Tbdl  Commonly known as:  ZOFRAN-ODT  One by  mouth every 8 hrs as needed for n/v            Loren Escobar MD  Obstetrics & Gynecology  [facility]    Patient transferred from [facility] after NSD,plcenta delivered intact bleeding controlled by methergine.  Doing well, I have reviewed the resident's note and agree with the diagnosis and management plan

## 2019-12-10 NOTE — ED NOTES
Pt care report called to [facility] OB nurse Malu accepted report. Phone number [phone].

## 2019-12-10 NOTE — ED NOTES
Fingerprints and footprints provided to pt by child life specialist team. Pt doing well, denies any complaints at this time.

## 2019-12-11 ENCOUNTER — TELEPHONE (OUTPATIENT)
Dept: OBSTETRICS AND GYNECOLOGY | Facility: CLINIC | Age: 24
End: 2019-12-11

## 2019-12-11 NOTE — TELEPHONE ENCOUNTER
Informed  that we learned today that her patient Yamel Toribio, delivered spontaneously at 19 wks GA, and fetus had no heartbeat at birth - was transferred from [facility] to [facility].

## 2019-12-11 NOTE — TELEPHONE ENCOUNTER
This pt needs to be scheduled with Dr. Charles on or after 1/8/20 for a follow-up after fetal demise. Please contact this pt to schedule.

## 2019-12-12 NOTE — TELEPHONE ENCOUNTER
Scheduled with Dr. Charles 1/10 at 11.      Pt states she is doing alright. Instructed her to call if she needs anything prior to appt.

## 2020-01-10 ENCOUNTER — POSTPARTUM VISIT (OUTPATIENT)
Dept: OBSTETRICS AND GYNECOLOGY | Facility: CLINIC | Age: 25
End: 2020-01-10
Payer: MEDICAID

## 2020-01-10 VITALS
HEIGHT: 64 IN | BODY MASS INDEX: 30.67 KG/M2 | DIASTOLIC BLOOD PRESSURE: 78 MMHG | SYSTOLIC BLOOD PRESSURE: 120 MMHG | WEIGHT: 179.63 LBS

## 2020-01-10 DIAGNOSIS — O03.9 SPONTANEOUS PREGNANCY LOSS: ICD-10-CM

## 2020-01-10 DIAGNOSIS — O99.012 ANEMIA OF PREGNANCY IN SECOND TRIMESTER: ICD-10-CM

## 2020-01-10 DIAGNOSIS — Z30.09 GENERAL COUNSELING AND ADVICE ON FEMALE CONTRACEPTION: ICD-10-CM

## 2020-01-10 PROCEDURE — 99999 PR PBB SHADOW E&M-EST. PATIENT-LVL III: CPT | Mod: PBBFAC,,, | Performed by: OBSTETRICS & GYNECOLOGY

## 2020-01-10 PROCEDURE — 99213 OFFICE O/P EST LOW 20 MIN: CPT | Mod: PBBFAC,TH,PO | Performed by: OBSTETRICS & GYNECOLOGY

## 2020-01-10 PROCEDURE — 99999 PR PBB SHADOW E&M-EST. PATIENT-LVL III: ICD-10-PCS | Mod: PBBFAC,,, | Performed by: OBSTETRICS & GYNECOLOGY

## 2020-01-10 PROCEDURE — 59430 PR CARE AFTER DELIVERY ONLY: ICD-10-PCS | Mod: ,,, | Performed by: OBSTETRICS & GYNECOLOGY

## 2020-01-10 RX ORDER — NORELGESTROMIN AND ETHINYL ESTRADIOL 35; 150 UG/MG; UG/MG
1 PATCH TRANSDERMAL
Qty: 4 PATCH | Refills: 11 | Status: SHIPPED | OUTPATIENT
Start: 2020-01-10 | End: 2020-03-17

## 2020-01-10 NOTE — PROGRESS NOTES
Subjective:      Yamel Toribio is a 24 y.o.  who presents for a postpartum visit.  She is status post  spontaneous  at 18w5d. 4 weeks ago on 12/10/19.  Her hospitalization was complicated.   She desires [facility] for contraception.  She reports mood is good.  McGraw scale of 2. Some stress with partner initially during time of loss but doing well now.    Her last pap was negative on 2018    Past Medical History:   Diagnosis Date    Asthma        Current Outpatient Medications:     FERRALET 90 DUAL-IRON DELIVERY 90-1-12-50 mg-mg-mcg-mg Tab, TK 1 T PO QD, Disp: , Rfl: 11    ibuprofen (ADVIL,MOTRIN) 600 MG tablet, Take 1 tablet (600 mg total) by mouth every 6 (six) hours as needed., Disp: 30 tablet, Rfl: 0    ondansetron (ZOFRAN-ODT) 4 MG TbDL, One by mouth every 8 hrs as needed for n/v, Disp: 30 tablet, Rfl: 1      Objective:     Vitals:    01/10/20 1049   BP: 120/78       APPEARANCE: Well nourished, well developed, in no acute distress.  PSYCH: Appropriate mood and affect.   NECK:  Supple, no thyromegaly.  BREASTS:  No masses, no nipple discharge.  CARDIOVASCULAR:  Regular rate and rhythm.  LUNGS:  Clear to auscultation bilaterally.  ABDOMEN:  Soft, nontender.  GENITOURINARY:  External genitalia normal in appearance, normal perineal body, normal urethral meatus.  Vagina with scant white discharge, no blood.  No lesions.  Cervix without lesion, C/T/H.  Uterus mobile, nontender, normal in size.  Adnexa without masses or TTP.    EXTREMITIES: No edema.      Assessment:     Postpartum exam  - Patient doing well. Does not desire pregnancy for another year. Desires patch.     Spontaneous pregnancy loss  - First pregnancy had arrested  labor at 33WGA when patient had a vulvar abscess then delivered at 40WGA. No other historical factors suggestive of incompetent cervix but some concern for  labor cause. Patient declined genetic testing.    Anemia of pregnancy in second trimester  -  H&H stable in hospital at 10/30. Taking Ferralet daily with no bleeding. Recommend to return to using with cycles only.    General counseling and advice on female contraception  - Xulane patch. Patient had sex a few days ago and desires to wait 2 weeks and take a pregnancy test. Counseled not necessary but patient will wait anyway. Patch use and risks discussed. Prescription sent to the pharmacy.      Plan:     1. Postpartum course reviewed and discussed with patient.  All questions answered.  Return to clinic in 3 months for contraception followup and  1 year for annual exam      Adriana Charles MD

## 2020-03-11 ENCOUNTER — PATIENT MESSAGE (OUTPATIENT)
Dept: OBSTETRICS AND GYNECOLOGY | Facility: CLINIC | Age: 25
End: 2020-03-11

## 2020-03-12 ENCOUNTER — PATIENT MESSAGE (OUTPATIENT)
Dept: OBSTETRICS AND GYNECOLOGY | Facility: CLINIC | Age: 25
End: 2020-03-12

## 2020-03-17 ENCOUNTER — PATIENT MESSAGE (OUTPATIENT)
Dept: OBSTETRICS AND GYNECOLOGY | Facility: CLINIC | Age: 25
End: 2020-03-17

## 2020-03-17 ENCOUNTER — PATIENT MESSAGE (OUTPATIENT)
Dept: ADMINISTRATIVE | Facility: OTHER | Age: 25
End: 2020-03-17

## 2020-03-17 ENCOUNTER — OFFICE VISIT (OUTPATIENT)
Dept: OBSTETRICS AND GYNECOLOGY | Facility: CLINIC | Age: 25
End: 2020-03-17
Payer: MEDICAID

## 2020-03-17 VITALS
WEIGHT: 184 LBS | DIASTOLIC BLOOD PRESSURE: 60 MMHG | TEMPERATURE: 99 F | SYSTOLIC BLOOD PRESSURE: 120 MMHG | BODY MASS INDEX: 31.41 KG/M2 | HEIGHT: 64 IN

## 2020-03-17 DIAGNOSIS — N89.8 VAGINAL DISCHARGE: ICD-10-CM

## 2020-03-17 DIAGNOSIS — N92.6 MISSED MENSES: Primary | ICD-10-CM

## 2020-03-17 DIAGNOSIS — Z32.01 ENCOUNTER FOR PREGNANCY TEST, RESULT POSITIVE: ICD-10-CM

## 2020-03-17 LAB
B-HCG UR QL: POSITIVE
CTP QC/QA: YES

## 2020-03-17 PROCEDURE — 99203 PR OFFICE/OUTPT VISIT, NEW, LEVL III, 30-44 MIN: ICD-10-PCS | Mod: S$PBB,TH,, | Performed by: OBSTETRICS & GYNECOLOGY

## 2020-03-17 PROCEDURE — 87661 TRICHOMONAS VAGINALIS AMPLIF: CPT

## 2020-03-17 PROCEDURE — 99999 PR PBB SHADOW E&M-EST. PATIENT-LVL III: CPT | Mod: PBBFAC,,, | Performed by: OBSTETRICS & GYNECOLOGY

## 2020-03-17 PROCEDURE — 99203 OFFICE O/P NEW LOW 30 MIN: CPT | Mod: S$PBB,TH,, | Performed by: OBSTETRICS & GYNECOLOGY

## 2020-03-17 PROCEDURE — 87491 CHLMYD TRACH DNA AMP PROBE: CPT | Mod: 59

## 2020-03-17 PROCEDURE — 87481 CANDIDA DNA AMP PROBE: CPT | Mod: 59

## 2020-03-17 PROCEDURE — 81025 URINE PREGNANCY TEST: CPT | Mod: PBBFAC,PO | Performed by: OBSTETRICS & GYNECOLOGY

## 2020-03-17 PROCEDURE — 99213 OFFICE O/P EST LOW 20 MIN: CPT | Mod: PBBFAC,TH,PO | Performed by: OBSTETRICS & GYNECOLOGY

## 2020-03-17 PROCEDURE — 99999 PR PBB SHADOW E&M-EST. PATIENT-LVL III: ICD-10-PCS | Mod: PBBFAC,,, | Performed by: OBSTETRICS & GYNECOLOGY

## 2020-03-17 NOTE — PROGRESS NOTES
Chief Complaint: Absence of Menses     HPI:   Patient known to me.   Yamel Toribio is a 24 y.o.  who presents complaining of absence of menses.  She reports no current symptoms. Denies vaginal bleeding, abnormal discharge and pain. Patient had a SAB at 18 weeks 5 days (12/10/2019) with no known reason found. No testing desired by patient. Previous pregnancy full-term with only hyperemesis complication in early pregnancy.    GYN Hx:  LMP 2020.  Menarche 14 with cycles occurring every 28-30 days and menses lasting 5-7 days.     Flu shot: Declined  ZIKA travel or exposure: none    Past Medical History:   Diagnosis Date    Anemia     Iron deficiency    Asthma      Past Surgical History:   Procedure Laterality Date     SECTION  2018    [facility] (FTP & NRFHT's)     Social History     Tobacco Use    Smoking status: Former Smoker    Smokeless tobacco: Never Used   Substance Use Topics    Alcohol use: No    Drug use: No     Family History   Problem Relation Age of Onset    No Known Problems Father     No Known Problems Mother     No Known Problems Brother      OB History    Para Term  AB Living   3 1 1   2 1   SAB TAB Ectopic Multiple Live Births     1   0 1      # Outcome Date GA Lbr Lewis/2nd Weight Sex Delivery Anes PTL Lv   3 AB 12/10/19 18w5d    SAB None Y FD   2 TAB            1 Term  40w1d   F CS-LTranv EPI  TAHMINA      Complications:  labor in third trimester with term delivery, fetus 1       Current Outpatient Medications:     FERRALET 90 DUAL-IRON DELIVERY 90-1-12-50 mg-mg-mcg-mg Tab, TK 1 T PO QD, Disp: 30 tablet, Rfl: 11  ROS:     GENERAL: Denies weight gain or weight loss. Feeling well overall.   SKIN: Denies rash or lesions.   BREASTS: Denies lumps or nipple discharge. + tenderness.  ABDOMEN: Denies abdominal pain, constipation, diarrhea. no nausea/no vomiting  URINARY: Denies dysuria, hematuria. + frequency.  NEUROLOGIC: Denies syncope or weakness.  "  PSYCHIATRIC: Denies depression, anxiety or mood swings.    Physical Exam:      PHYSICAL EXAM:  /60   Temp 99 °F (37.2 °C)   Ht 5' 4" (1.626 m)   Wt 83.5 kg (184 lb)   LMP 2020 (Exact Date)   BMI 31.58 kg/m²   Body mass index is 31.58 kg/m².     APPEARANCE: Well nourished, well developed, in no acute distress.  PSYCH: Appropriate mood and affect.  EXTREMITIES: No edema.  PELVIC: Vulva and vagina without lesions or bleeding. Moderate white discharge with slight odor. Cervix os closed, non-tender and without lesions. Uterus small, mobile and non-tender. Adnexa no fullness, tenderness or masses.    Results for orders placed or performed in visit on 20   POCT urine pregnancy   Result Value Ref Range    POC Preg Test, Ur Positive (A) Negative     Acceptable Yes        Assessment/Plan:       ICD-10-CM ICD-9-CM    1. Missed menses N92.6 626.4 POCT urine pregnancy   2. Encounter for pregnancy test, result positive Z32.01 V72.42 C. trachomatis/N. gonorrhoeae by AMP DNA [facility]; Cervix      Connected MOM Enrollment      Assign Connected MOM Program Consent Questionnaire      US OB/GYN Procedure ([facility]) - Extended List      ABO/Rh      CBC auto differential      Hepatitis B Surface Antigen      HIV 1/2 Ag/Ab (4th Gen)      RPR      Rubella Antibody, IgG      TSH      Type & Screen - Ob Profile      Urine culture   3. Vaginal discharge N89.8 623.5 Vaginosis Screen by DNA Probe     RTC in 3-4 weeks    Counselin. Patient was counseled today on proper weight gain based on the [place] [facility]'s recommendations based on her pre-pregnancy weight.   2. Discussed foods to avoid in pregnancy (i.e. sushi, fish that are high in mercury, deli meat, and unpasteurized cheeses).   3. Discussed prenatal vitamin options and folic acid (i.e. stool softener, DHA).   4. Optional genetic testing discussed - patient will let us know if she is interested.   5. Optional carrier screening " discussed - patient will let us know if she is interested.  5. Safety of exercise discussed with patient, and continued active lifestyle encouraged.  6. Zika virus precautions for both patient and her spouse. Coronavirus precautions and Connected MOM sign up discussed.  7. Normal course of prenatal care reviewed.  8. Medications safe in pregnancy discussed and list provided.  9. Patient will be high risk this pregnancy after second trimester SAB with unknown cause. Will schedule High Risk visit at end of first trimester. Patient agreed to plan.    30 minutes of face-to-face discussion occurred during today's visit.     Use of the [facility] Patient Portal discussed and encouraged during today's visit.

## 2020-03-18 ENCOUNTER — PATIENT MESSAGE (OUTPATIENT)
Dept: OBSTETRICS AND GYNECOLOGY | Facility: CLINIC | Age: 25
End: 2020-03-18

## 2020-03-18 DIAGNOSIS — B96.89 BACTERIAL VAGINITIS: ICD-10-CM

## 2020-03-18 DIAGNOSIS — N76.0 BACTERIAL VAGINITIS: ICD-10-CM

## 2020-03-18 DIAGNOSIS — B37.31 CANDIDAL VAGINITIS: Primary | ICD-10-CM

## 2020-03-18 LAB
BACTERIAL VAGINOSIS DNA: POSITIVE
C TRACH DNA SPEC QL NAA+PROBE: NOT DETECTED
CANDIDA GLABRATA DNA: NEGATIVE
CANDIDA KRUSEI DNA: NEGATIVE
CANDIDA RRNA VAG QL PROBE: POSITIVE
N GONORRHOEA DNA SPEC QL NAA+PROBE: NOT DETECTED
T VAGINALIS RRNA GENITAL QL PROBE: NEGATIVE

## 2020-03-18 RX ORDER — METRONIDAZOLE 500 MG/1
500 TABLET ORAL EVERY 12 HOURS
Qty: 14 TABLET | Refills: 0 | Status: SHIPPED | OUTPATIENT
Start: 2020-03-18 | End: 2020-03-25

## 2020-03-18 RX ORDER — FLUCONAZOLE 150 MG/1
150 TABLET ORAL
Qty: 2 TABLET | Refills: 0 | Status: SHIPPED | OUTPATIENT
Start: 2020-03-18 | End: 2020-03-22

## 2020-03-18 NOTE — PROGRESS NOTES
GC/CT negative  Vaginosis swab positive for Candida and Bacterial vaginosis.  Rx to pharmacy for Diflucan and Flagyl. Will repeat at next visit.

## 2020-03-18 NOTE — PROGRESS NOTES
Vaginosis swab positive for yeast and BV. Rx sent to pharmacy for treatment and plan repeat swab at next visit.

## 2020-03-23 ENCOUNTER — PATIENT MESSAGE (OUTPATIENT)
Dept: OBSTETRICS AND GYNECOLOGY | Facility: CLINIC | Age: 25
End: 2020-03-23

## 2020-03-25 ENCOUNTER — PATIENT MESSAGE (OUTPATIENT)
Dept: OBSTETRICS AND GYNECOLOGY | Facility: CLINIC | Age: 25
End: 2020-03-25

## 2020-04-01 ENCOUNTER — PROCEDURE VISIT (OUTPATIENT)
Dept: OBSTETRICS AND GYNECOLOGY | Facility: CLINIC | Age: 25
End: 2020-04-01
Payer: MEDICAID

## 2020-04-01 DIAGNOSIS — N91.2 AMENORRHEA: ICD-10-CM

## 2020-04-01 DIAGNOSIS — Z32.01 ENCOUNTER FOR PREGNANCY TEST, RESULT POSITIVE: ICD-10-CM

## 2020-04-01 PROCEDURE — 76801 OB US < 14 WKS SINGLE FETUS: CPT | Mod: PBBFAC,PN | Performed by: OBSTETRICS & GYNECOLOGY

## 2020-04-01 PROCEDURE — 76801 PR US, OB <14WKS, TRANSABD, SINGLE GESTATION: ICD-10-PCS | Mod: 26,S$PBB,, | Performed by: OBSTETRICS & GYNECOLOGY

## 2020-04-01 PROCEDURE — 76801 OB US < 14 WKS SINGLE FETUS: CPT | Mod: 26,S$PBB,, | Performed by: OBSTETRICS & GYNECOLOGY

## 2020-04-06 ENCOUNTER — TELEPHONE (OUTPATIENT)
Dept: OBSTETRICS AND GYNECOLOGY | Facility: CLINIC | Age: 25
End: 2020-04-06

## 2020-04-06 NOTE — TELEPHONE ENCOUNTER
José Miguel from pathology called asking if  would like to have placenta from 12/10/19 tested. The placenta was received but with no orders.No history of patient being seen in clinic, but she has previously seen Dr. Charles. Pathology is asking if the placenta needs to be tested or disposed of? She just confirmed a pregnancy with  on 04/01/20.

## 2020-04-09 ENCOUNTER — PATIENT MESSAGE (OUTPATIENT)
Dept: UROLOGY | Facility: CLINIC | Age: 25
End: 2020-04-09

## 2020-04-15 ENCOUNTER — ROUTINE PRENATAL (OUTPATIENT)
Dept: OBSTETRICS AND GYNECOLOGY | Facility: CLINIC | Age: 25
End: 2020-04-15
Payer: MEDICAID

## 2020-04-15 VITALS
SYSTOLIC BLOOD PRESSURE: 105 MMHG | WEIGHT: 183.88 LBS | BODY MASS INDEX: 31.56 KG/M2 | DIASTOLIC BLOOD PRESSURE: 60 MMHG

## 2020-04-15 DIAGNOSIS — O21.9 NAUSEA AND VOMITING OF PREGNANCY, ANTEPARTUM: ICD-10-CM

## 2020-04-15 DIAGNOSIS — O09.291 HISTORY OF PREGNANCY LOSS IN PRIOR PREGNANCY, CURRENTLY PREGNANT IN FIRST TRIMESTER: ICD-10-CM

## 2020-04-15 DIAGNOSIS — Z3A.10 10 WEEKS GESTATION OF PREGNANCY: ICD-10-CM

## 2020-04-15 DIAGNOSIS — N89.8 VAGINAL DISCHARGE: Primary | ICD-10-CM

## 2020-04-15 DIAGNOSIS — Z34.81 ENCOUNTER FOR SUPERVISION OF OTHER NORMAL PREGNANCY, FIRST TRIMESTER: ICD-10-CM

## 2020-04-15 PROCEDURE — 87661 TRICHOMONAS VAGINALIS AMPLIF: CPT

## 2020-04-15 PROCEDURE — 87481 CANDIDA DNA AMP PROBE: CPT | Mod: 59

## 2020-04-15 PROCEDURE — 99212 PR OFFICE/OUTPT VISIT, EST, LEVL II, 10-19 MIN: ICD-10-PCS | Mod: S$PBB,TH,, | Performed by: OBSTETRICS & GYNECOLOGY

## 2020-04-15 PROCEDURE — 99999 PR PBB SHADOW E&M-EST. PATIENT-LVL III: CPT | Mod: PBBFAC,,, | Performed by: OBSTETRICS & GYNECOLOGY

## 2020-04-15 PROCEDURE — 99213 OFFICE O/P EST LOW 20 MIN: CPT | Mod: PBBFAC,TH,PO | Performed by: OBSTETRICS & GYNECOLOGY

## 2020-04-15 PROCEDURE — 99999 PR PBB SHADOW E&M-EST. PATIENT-LVL III: ICD-10-PCS | Mod: PBBFAC,,, | Performed by: OBSTETRICS & GYNECOLOGY

## 2020-04-15 PROCEDURE — 99212 OFFICE O/P EST SF 10 MIN: CPT | Mod: S$PBB,TH,, | Performed by: OBSTETRICS & GYNECOLOGY

## 2020-04-15 RX ORDER — PROMETHAZINE HYDROCHLORIDE 25 MG/1
25 SUPPOSITORY RECTAL EVERY 6 HOURS PRN
Qty: 12 SUPPOSITORY | Refills: 1 | Status: SHIPPED | OUTPATIENT
Start: 2020-04-15 | End: 2020-07-20

## 2020-04-15 NOTE — PROGRESS NOTES
10w1d  Patient with daily nausea and vomiting. Not currently taking any antiemetics. Desires phenergan suppositories. Rx sent to pharmacy. Counseled on Bonjesta and Zofran if not controlled. Patient reports Bonjesta not helping well in past. Urine with mild ketones. Discussed proper hydration and HAYES if unable to hold down food or drink.  Denies vaginal bleeding or pelvic pain. She completed medications for vaginitis but still notices a mucus-like discharge. No itch or burn.  Labs to be done today. Ultrasound reviewed with patient.  Discussed referral to [facility] secondary to history of second trimester loss. Placental tests currently being run by pathology from last pregnancy loss. Order placed today. Discussed genetic testing. Patient will consider once sees MFM.  Discussed Connected MOM- patient has everything at home already received in last pregnancy.  SVE: Cervix with mucus at os. No blood or dilation of cervix. Affirm repeated. No active vaginitis noticed.  RTC 4 weeks

## 2020-04-16 LAB
BACTERIAL VAGINOSIS DNA: POSITIVE
CANDIDA GLABRATA DNA: NEGATIVE
CANDIDA KRUSEI DNA: NEGATIVE
CANDIDA RRNA VAG QL PROBE: POSITIVE
T VAGINALIS RRNA GENITAL QL PROBE: NEGATIVE

## 2020-04-22 ENCOUNTER — TELEPHONE (OUTPATIENT)
Dept: OBSTETRICS AND GYNECOLOGY | Facility: CLINIC | Age: 25
End: 2020-04-22

## 2020-04-22 ENCOUNTER — PATIENT MESSAGE (OUTPATIENT)
Dept: OBSTETRICS AND GYNECOLOGY | Facility: CLINIC | Age: 25
End: 2020-04-22

## 2020-04-22 DIAGNOSIS — B37.31 CANDIDAL VAGINITIS: Primary | ICD-10-CM

## 2020-04-22 RX ORDER — TERCONAZOLE 4 MG/G
1 CREAM VAGINAL NIGHTLY
Qty: 45 G | Refills: 1 | Status: SHIPPED | OUTPATIENT
Start: 2020-04-22 | End: 2020-04-29

## 2020-04-22 NOTE — PROGRESS NOTES
Still with both yeast and bacterial vaginosis. Likely did not keep down medication due to nausea/vomiting. Will retreat one at a time to make sure tolerates.

## 2020-04-23 ENCOUNTER — HOSPITAL ENCOUNTER (EMERGENCY)
Facility: OTHER | Age: 25
Discharge: HOME | End: 2020-04-23
Attending: EMERGENCY MEDICINE
Payer: MEDICAID

## 2020-04-23 VITALS
WEIGHT: 181 LBS | DIASTOLIC BLOOD PRESSURE: 64 MMHG | HEART RATE: 86 BPM | RESPIRATION RATE: 16 BRPM | BODY MASS INDEX: 30.9 KG/M2 | SYSTOLIC BLOOD PRESSURE: 117 MMHG | HEIGHT: 64 IN | TEMPERATURE: 98 F | OXYGEN SATURATION: 100 %

## 2020-04-23 DIAGNOSIS — O16.1 ELEVATED BLOOD PRESSURE AFFECTING PREGNANCY IN FIRST TRIMESTER, ANTEPARTUM: ICD-10-CM

## 2020-04-23 DIAGNOSIS — R82.4 KETONURIA: ICD-10-CM

## 2020-04-23 DIAGNOSIS — O21.9 NAUSEA/VOMITING IN PREGNANCY: Primary | ICD-10-CM

## 2020-04-23 LAB
ALBUMIN SERPL BCP-MCNC: 3.7 G/DL (ref 3.5–5.2)
ALP SERPL-CCNC: 55 U/L (ref 55–135)
ALT SERPL W/O P-5'-P-CCNC: 8 U/L (ref 10–44)
ANION GAP SERPL CALC-SCNC: 14 MMOL/L (ref 8–16)
ANISOCYTOSIS BLD QL SMEAR: SLIGHT
AST SERPL-CCNC: 9 U/L (ref 10–40)
B-OH-BUTYR BLD STRIP-SCNC: 0.4 MMOL/L (ref 0–0.5)
BACTERIA #/AREA URNS HPF: NORMAL /HPF
BASOPHILS # BLD AUTO: 0.01 K/UL (ref 0–0.2)
BASOPHILS NFR BLD: 0.1 % (ref 0–1.9)
BILIRUB SERPL-MCNC: 0.2 MG/DL (ref 0.1–1)
BILIRUB UR QL STRIP: NEGATIVE
BUN SERPL-MCNC: 4 MG/DL (ref 6–20)
CALCIUM SERPL-MCNC: 8.6 MG/DL (ref 8.7–10.5)
CHLORIDE SERPL-SCNC: 107 MMOL/L (ref 95–110)
CLARITY UR: CLEAR
CO2 SERPL-SCNC: 16 MMOL/L (ref 23–29)
COLOR UR: YELLOW
CREAT SERPL-MCNC: 0.6 MG/DL (ref 0.5–1.4)
DIFFERENTIAL METHOD: ABNORMAL
EOSINOPHIL # BLD AUTO: 0 K/UL (ref 0–0.5)
EOSINOPHIL NFR BLD: 0 % (ref 0–8)
ERYTHROCYTE [DISTWIDTH] IN BLOOD BY AUTOMATED COUNT: 19.9 % (ref 11.5–14.5)
EST. GFR  (AFRICAN AMERICAN): >60 ML/MIN/1.73 M^2
EST. GFR  (NON AFRICAN AMERICAN): >60 ML/MIN/1.73 M^2
GLUCOSE SERPL-MCNC: 143 MG/DL (ref 70–110)
GLUCOSE UR QL STRIP: NEGATIVE
HCT VFR BLD AUTO: 30.9 % (ref 37–48.5)
HGB BLD-MCNC: 9.3 G/DL (ref 12–16)
HGB UR QL STRIP: NEGATIVE
HYPOCHROMIA BLD QL SMEAR: ABNORMAL
IMM GRANULOCYTES # BLD AUTO: 0.05 K/UL (ref 0–0.04)
IMM GRANULOCYTES NFR BLD AUTO: 0.5 % (ref 0–0.5)
KETONES UR QL STRIP: ABNORMAL
LEUKOCYTE ESTERASE UR QL STRIP: ABNORMAL
LIPASE SERPL-CCNC: 30 U/L (ref 4–60)
LYMPHOCYTES # BLD AUTO: 1.4 K/UL (ref 1–4.8)
LYMPHOCYTES NFR BLD: 13.7 % (ref 18–48)
MAGNESIUM SERPL-MCNC: 1.6 MG/DL (ref 1.6–2.6)
MCH RBC QN AUTO: 22.7 PG (ref 27–31)
MCHC RBC AUTO-ENTMCNC: 30.1 G/DL (ref 32–36)
MCV RBC AUTO: 75 FL (ref 82–98)
MICROSCOPIC COMMENT: NORMAL
MONOCYTES # BLD AUTO: 0.4 K/UL (ref 0.3–1)
MONOCYTES NFR BLD: 4.1 % (ref 4–15)
NEUTROPHILS # BLD AUTO: 8.3 K/UL (ref 1.8–7.7)
NEUTROPHILS NFR BLD: 81.6 % (ref 38–73)
NITRITE UR QL STRIP: NEGATIVE
NRBC BLD-RTO: 0 /100 WBC
PH UR STRIP: 8 [PH] (ref 5–8)
PHOSPHATE SERPL-MCNC: 1.7 MG/DL (ref 2.7–4.5)
PLATELET # BLD AUTO: 218 K/UL (ref 150–350)
PLATELET BLD QL SMEAR: ABNORMAL
PMV BLD AUTO: 11.1 FL (ref 9.2–12.9)
POTASSIUM SERPL-SCNC: 3.4 MMOL/L (ref 3.5–5.1)
PROT SERPL-MCNC: 7.8 G/DL (ref 6–8.4)
PROT UR QL STRIP: NEGATIVE
RBC # BLD AUTO: 4.1 M/UL (ref 4–5.4)
RBC #/AREA URNS HPF: 0 /HPF (ref 0–4)
SODIUM SERPL-SCNC: 137 MMOL/L (ref 136–145)
SP GR UR STRIP: 1.02 (ref 1–1.03)
SQUAMOUS #/AREA URNS HPF: 15 /HPF
URN SPEC COLLECT METH UR: ABNORMAL
UROBILINOGEN UR STRIP-ACNC: NEGATIVE EU/DL
WBC # BLD AUTO: 10.15 K/UL (ref 3.9–12.7)
WBC #/AREA URNS HPF: 5 /HPF (ref 0–5)

## 2020-04-23 PROCEDURE — 25000003 PHARM REV CODE 250: Performed by: EMERGENCY MEDICINE

## 2020-04-23 PROCEDURE — 82010 KETONE BODYS QUAN: CPT

## 2020-04-23 PROCEDURE — 80053 COMPREHEN METABOLIC PANEL: CPT

## 2020-04-23 PROCEDURE — 96375 TX/PRO/DX INJ NEW DRUG ADDON: CPT

## 2020-04-23 PROCEDURE — 84100 ASSAY OF PHOSPHORUS: CPT

## 2020-04-23 PROCEDURE — 96365 THER/PROPH/DIAG IV INF INIT: CPT

## 2020-04-23 PROCEDURE — 99284 EMERGENCY DEPT VISIT MOD MDM: CPT | Mod: 25

## 2020-04-23 PROCEDURE — 63600175 PHARM REV CODE 636 W HCPCS: Performed by: EMERGENCY MEDICINE

## 2020-04-23 PROCEDURE — 96361 HYDRATE IV INFUSION ADD-ON: CPT

## 2020-04-23 PROCEDURE — 85025 COMPLETE CBC W/AUTO DIFF WBC: CPT

## 2020-04-23 PROCEDURE — 83735 ASSAY OF MAGNESIUM: CPT

## 2020-04-23 PROCEDURE — 83690 ASSAY OF LIPASE: CPT

## 2020-04-23 PROCEDURE — 81000 URINALYSIS NONAUTO W/SCOPE: CPT

## 2020-04-23 RX ORDER — ONDANSETRON 2 MG/ML
4 INJECTION INTRAMUSCULAR; INTRAVENOUS
Status: COMPLETED | OUTPATIENT
Start: 2020-04-23 | End: 2020-04-23

## 2020-04-23 RX ORDER — SODIUM,POTASSIUM PHOSPHATES 280-250MG
1 POWDER IN PACKET (EA) ORAL ONCE
Status: COMPLETED | OUTPATIENT
Start: 2020-04-23 | End: 2020-04-23

## 2020-04-23 RX ORDER — ONDANSETRON 4 MG/1
4 TABLET, ORALLY DISINTEGRATING ORAL EVERY 6 HOURS PRN
Qty: 12 TABLET | Refills: 0 | Status: SHIPPED | OUTPATIENT
Start: 2020-04-23 | End: 2020-04-30 | Stop reason: SDUPTHER

## 2020-04-23 RX ADMIN — POTASSIUM & SODIUM PHOSPHATES POWDER PACK 280-160-250 MG 1 PACKET: 280-160-250 PACK at 08:04

## 2020-04-23 RX ADMIN — SODIUM CHLORIDE 1000 ML: 0.9 INJECTION, SOLUTION INTRAVENOUS at 07:04

## 2020-04-23 RX ADMIN — PROMETHAZINE HYDROCHLORIDE 25 MG: 25 INJECTION INTRAMUSCULAR; INTRAVENOUS at 07:04

## 2020-04-23 RX ADMIN — SODIUM CHLORIDE 1000 ML: 0.9 INJECTION, SOLUTION INTRAVENOUS at 06:04

## 2020-04-23 RX ADMIN — ONDANSETRON 4 MG: 2 INJECTION INTRAMUSCULAR; INTRAVENOUS at 06:04

## 2020-04-23 NOTE — DISCHARGE INSTRUCTIONS
Follow closely with your doctor.  Consider taking the Diclegis that you have at home.  Return for any new or worsening symptoms at any time.

## 2020-04-23 NOTE — ED TRIAGE NOTES
"Pt came to ED c/o vomiting and stomach pain x 1 day. Pt reports being admitted two days ago for  hyperemesis. Pt reports unable to keep prescribed suppository in. Pt states, " zofran IV and fluids are the only thing that are helping." Denies SOB, fever,chills, diarrhea.  "

## 2020-04-23 NOTE — ED PROVIDER NOTES
"Encounter Date: 2020    SCRIBE #1 NOTE: I, Alexa Patel, am scribing for, and in the presence of, Dr. Espinosa.       History     Chief Complaint   Patient presents with    Emesis     c/o multiple episodes of vomiting, onset @ 2300 last night. Hx of  hyperemesis, currently 11wks pregnant w/US confirmed IUP. Denies abd pain or vaginal bleeding.     Time seen by provider: 6:30 AM    This is a 24 y.o. A2 female at 11 weeks gestation who presents with complaint of nausea and vomiting in pregnancy.  She reports that for the last few days this is increased.  She states she was seen at another ED a few days ago and prescribed Phenergan suppositories but "it would not stay in." She is out of Zofran.  She reports similar severe nausea and vomiting with previous pregnancy.  She states she has vomited more than 15 times this morning.  She states she does not remember the last time she urinated.  She denies any fevers, chills, abdominal pain, vaginal bleeding or cramping, diarrhea.        Review of patient's allergies indicates:  No Known Allergies  Past Medical History:   Diagnosis Date    Anemia     Iron deficiency    Asthma      Past Surgical History:   Procedure Laterality Date     SECTION  2018    [facility] (FTP & NRFHT's)     Family History   Problem Relation Age of Onset    No Known Problems Father     No Known Problems Mother     No Known Problems Brother      Social History     Tobacco Use    Smoking status: Former Smoker    Smokeless tobacco: Never Used   Substance Use Topics    Alcohol use: No    Drug use: No     Review of Systems   Constitutional: Negative for chills and fever.   HENT: Negative for congestion and sore throat.    Eyes: Negative for visual disturbance.   Respiratory: Negative for cough and shortness of breath.    Cardiovascular: Negative for chest pain and palpitations.   Gastrointestinal: Positive for nausea and vomiting. Negative for abdominal pain and diarrhea. "   Genitourinary: Negative for decreased urine volume, dysuria and vaginal discharge.   Musculoskeletal: Negative for joint swelling, neck pain and neck stiffness.   Skin: Negative for rash and wound.   Neurological: Negative for weakness, numbness and headaches.   Psychiatric/Behavioral: Negative for confusion.       Physical Exam     Initial Vitals [04/23/20 0616]   BP Pulse Resp Temp SpO2   137/80 90 18 97.8 °F (36.6 °C) 100 %      MAP       --         Physical Exam    Nursing note and vitals reviewed.  Constitutional: She appears well-developed and well-nourished. She appears distressed.   Moaning, active emesis.   HENT:   Head: Normocephalic and atraumatic.   Mouth/Throat: Oropharynx is clear and moist. No oropharyngeal exudate.   Eyes: Conjunctivae and EOM are normal. Pupils are equal, round, and reactive to light.   Neck: Neck supple.   Cardiovascular: Normal rate and normal heart sounds.   No murmur heard.  Pulmonary/Chest: Breath sounds normal. No respiratory distress. She has no wheezes. She has no rhonchi. She has no rales.   Abdominal: Soft. There is tenderness (Mild epigastric). There is no rebound and no guarding.   Musculoskeletal: She exhibits no edema or tenderness.   Neurological: She is alert and oriented to person, place, and time. She has normal strength. GCS score is 15. GCS eye subscore is 4. GCS verbal subscore is 5. GCS motor subscore is 6.   Skin: Skin is warm and dry. No rash noted.   Psychiatric: She has a normal mood and affect. Thought content normal.         ED Course   Procedures  Labs Reviewed   CBC W/ AUTO DIFFERENTIAL - Abnormal; Notable for the following components:       Result Value    Hemoglobin 9.3 (*)     Hematocrit 30.9 (*)     Mean Corpuscular Volume 75 (*)     Mean Corpuscular Hemoglobin 22.7 (*)     Mean Corpuscular Hemoglobin Conc 30.1 (*)     RDW 19.9 (*)     Gran # (ANC) 8.3 (*)     Immature Grans (Abs) 0.05 (*)     Gran% 81.6 (*)     Lymph% 13.7 (*)     All other  components within normal limits   COMPREHENSIVE METABOLIC PANEL - Abnormal; Notable for the following components:    Potassium 3.4 (*)     CO2 16 (*)     Glucose 143 (*)     BUN, Bld 4 (*)     Calcium 8.6 (*)     AST 9 (*)     ALT 8 (*)     All other components within normal limits   URINALYSIS, REFLEX TO URINE CULTURE - Abnormal; Notable for the following components:    Ketones, UA 2+ (*)     Leukocytes, UA Trace (*)     All other components within normal limits    Narrative:     Preferred Collection Type->Urine, Clean Catch   PHOSPHORUS - Abnormal; Notable for the following components:    Phosphorus 1.7 (*)     All other components within normal limits   LIPASE   MAGNESIUM   BETA - HYDROXYBUTYRATE, SERUM   URINALYSIS MICROSCOPIC    Narrative:     Preferred Collection Type->Urine, Clean Catch          Imaging Results    None          Medical Decision Making:   History:   Old Medical Records: I decided to obtain old medical records.  Clinical Tests:   Lab Tests: Ordered and Reviewed  ED Management:  Emergent evaluation of a 24-year-old female who presents with complaint of nausea vomiting in pregnancy.  She reports similar symptoms with previous pregnancy, this time over the last few days.  Patient did admit that she has likely just at home which she is not taking, also Phenergan suppositories which she states she is unable to insert correctly.  She states she is out of Zofran and is requesting refill.  On exam she is distressed and moaning, active emesis, mild epigastric tenderness but nonsurgical abdomen.  She is treated with IV fluids and Zofran.  After 2 L and antiemetics she feels much improved.  Urine does show ketones, serum ketones are negative.  There is no anion gap or major electrolyte disturbance other than mild hypophosphatemia.  She did tolerate oral phosphorus solution.  There is mild anemia which is stable from previous values.  Patient blood pressure is mildly elevated and has been at last few  visits.  She is made aware of this and advised to follow it closely.  She is encouraged to follow up with her OBGYN and to return for new or worsening symptoms as needed.  She is discharged in improved condition.            Scribe Attestation:   Scribe #1: I performed the above scribed service and the documentation accurately describes the services I performed. I attest to the accuracy of the note.    Attending Attestation:           Physician Attestation for Scribe:  Physician Attestation Statement for Scribe #1: I, Dr. Espinosa, reviewed documentation, as scribed by Alexa Patel in my presence, and it is both accurate and complete.                               Clinical Impression:     1. Nausea/vomiting in pregnancy    2. Elevated blood pressure affecting pregnancy in first trimester, antepartum    3. Ketonuria              ED Disposition Condition    Discharge Stable        ED Prescriptions     Medication Sig Dispense Start Date End Date Auth. Provider    ondansetron (ZOFRAN-ODT) 4 MG TbDL Take 1 tablet (4 mg total) by mouth every 6 (six) hours as needed (nausea). Dissolve tablet under the tongue 12 tablet 4/23/2020  Christianne Espinosa MD        Follow-up Information     Follow up With Specialties Details Why Contact Info    Adriana Charles MD Obstetrics and Gynecology Schedule an appointment as soon as possible for a visit  call today to schedule close follow up [address] [facility]  [address]  [place] [address]  [phone]      [facility]  As needed, If symptoms worsen [address]  [facility] [id number] [phone]                                     Christianne Espinosa MD  04/23/20 0975

## 2020-04-29 ENCOUNTER — TELEPHONE (OUTPATIENT)
Dept: OBSTETRICS AND GYNECOLOGY | Facility: CLINIC | Age: 25
End: 2020-04-29

## 2020-04-30 ENCOUNTER — PATIENT MESSAGE (OUTPATIENT)
Dept: OBSTETRICS AND GYNECOLOGY | Facility: CLINIC | Age: 25
End: 2020-04-30

## 2020-04-30 RX ORDER — ONDANSETRON 4 MG/1
4 TABLET, ORALLY DISINTEGRATING ORAL EVERY 6 HOURS PRN
Qty: 12 TABLET | Refills: 0 | Status: SHIPPED | OUTPATIENT
Start: 2020-04-30 | End: 2020-05-03 | Stop reason: SDUPTHER

## 2020-05-04 RX ORDER — ONDANSETRON 4 MG/1
4 TABLET, ORALLY DISINTEGRATING ORAL EVERY 6 HOURS PRN
Qty: 12 TABLET | Refills: 0 | OUTPATIENT
Start: 2020-05-04 | End: 2020-05-16

## 2020-05-04 NOTE — TELEPHONE ENCOUNTER
Pt requesting zofran to be sent to [facility] instead of [facility] pharmacy.

## 2020-05-05 ENCOUNTER — PATIENT MESSAGE (OUTPATIENT)
Dept: OBSTETRICS AND GYNECOLOGY | Facility: CLINIC | Age: 25
End: 2020-05-05

## 2020-05-13 ENCOUNTER — PATIENT MESSAGE (OUTPATIENT)
Dept: OBSTETRICS AND GYNECOLOGY | Facility: CLINIC | Age: 25
End: 2020-05-13

## 2020-05-13 ENCOUNTER — HOSPITAL ENCOUNTER (EMERGENCY)
Facility: OTHER | Age: 25
Discharge: HOME | End: 2020-05-13
Attending: EMERGENCY MEDICINE
Payer: MEDICAID

## 2020-05-13 ENCOUNTER — ROUTINE PRENATAL (OUTPATIENT)
Dept: OBSTETRICS AND GYNECOLOGY | Facility: CLINIC | Age: 25
End: 2020-05-13
Payer: MEDICAID

## 2020-05-13 ENCOUNTER — LAB VISIT (OUTPATIENT)
Dept: LAB | Facility: HOSPITAL | Age: 25
End: 2020-05-13
Attending: OBSTETRICS & GYNECOLOGY
Payer: MEDICAID

## 2020-05-13 VITALS
WEIGHT: 186.81 LBS | DIASTOLIC BLOOD PRESSURE: 60 MMHG | SYSTOLIC BLOOD PRESSURE: 122 MMHG | BODY MASS INDEX: 32.06 KG/M2

## 2020-05-13 VITALS
HEART RATE: 84 BPM | WEIGHT: 186 LBS | OXYGEN SATURATION: 100 % | TEMPERATURE: 99 F | SYSTOLIC BLOOD PRESSURE: 114 MMHG | RESPIRATION RATE: 16 BRPM | DIASTOLIC BLOOD PRESSURE: 74 MMHG | BODY MASS INDEX: 31.76 KG/M2 | HEIGHT: 64 IN

## 2020-05-13 DIAGNOSIS — B96.89 BACTERIAL VAGINITIS: ICD-10-CM

## 2020-05-13 DIAGNOSIS — Z3A.14 14 WEEKS GESTATION OF PREGNANCY: Primary | ICD-10-CM

## 2020-05-13 DIAGNOSIS — Z34.82 ENCOUNTER FOR SUPERVISION OF OTHER NORMAL PREGNANCY, SECOND TRIMESTER: ICD-10-CM

## 2020-05-13 DIAGNOSIS — O26.899 RH NEGATIVE STATE IN ANTEPARTUM PERIOD: ICD-10-CM

## 2020-05-13 DIAGNOSIS — O09.292 HISTORY OF PREGNANCY LOSS IN PRIOR PREGNANCY, CURRENTLY PREGNANT IN SECOND TRIMESTER: ICD-10-CM

## 2020-05-13 DIAGNOSIS — Z34.90 PREGNANCY, UNSPECIFIED GESTATIONAL AGE: ICD-10-CM

## 2020-05-13 DIAGNOSIS — Z3A.14 14 WEEKS GESTATION OF PREGNANCY: ICD-10-CM

## 2020-05-13 DIAGNOSIS — O26.892 RH NEGATIVE STATUS DURING PREGNANCY IN SECOND TRIMESTER: ICD-10-CM

## 2020-05-13 DIAGNOSIS — Z67.91 RH NEGATIVE STATE IN ANTEPARTUM PERIOD: ICD-10-CM

## 2020-05-13 DIAGNOSIS — N76.0 RECURRENT VAGINITIS: ICD-10-CM

## 2020-05-13 DIAGNOSIS — O46.90 VAGINAL BLEEDING IN PREGNANCY: Primary | ICD-10-CM

## 2020-05-13 DIAGNOSIS — N30.00 ACUTE CYSTITIS WITHOUT HEMATURIA: ICD-10-CM

## 2020-05-13 DIAGNOSIS — Z67.91 RH NEGATIVE STATE IN ANTEPARTUM PERIOD: Primary | ICD-10-CM

## 2020-05-13 DIAGNOSIS — O26.899 RH NEGATIVE STATE IN ANTEPARTUM PERIOD: Primary | ICD-10-CM

## 2020-05-13 DIAGNOSIS — N76.0 BACTERIAL VAGINITIS: ICD-10-CM

## 2020-05-13 DIAGNOSIS — Z67.91 RH NEGATIVE STATUS DURING PREGNANCY IN SECOND TRIMESTER: ICD-10-CM

## 2020-05-13 PROBLEM — O20.0 THREATENED ABORTION IN SECOND TRIMESTER: Status: ACTIVE | Noted: 2020-05-13

## 2020-05-13 LAB
ABO + RH BLD: NORMAL
BASOPHILS # BLD AUTO: 0.02 K/UL (ref 0–0.2)
BASOPHILS NFR BLD: 0.2 % (ref 0–1.9)
BLD GP AB SCN CELLS X3 SERPL QL: NORMAL
BLD GP AB SCN CELLS X3 SERPL QL: NORMAL
DIFFERENTIAL METHOD: ABNORMAL
EOSINOPHIL # BLD AUTO: 0 K/UL (ref 0–0.5)
EOSINOPHIL NFR BLD: 0.4 % (ref 0–8)
ERYTHROCYTE [DISTWIDTH] IN BLOOD BY AUTOMATED COUNT: 20.4 % (ref 11.5–14.5)
ESTIMATED AVG GLUCOSE: 108 MG/DL (ref 68–131)
FETAL CELL SCN BLD QL ROSETTE: NORMAL
HBA1C MFR BLD HPLC: 5.4 % (ref 4–5.6)
HCT VFR BLD AUTO: 30.6 % (ref 37–48.5)
HGB BLD-MCNC: 8.7 G/DL (ref 12–16)
IMM GRANULOCYTES # BLD AUTO: 0.02 K/UL (ref 0–0.04)
IMM GRANULOCYTES NFR BLD AUTO: 0.2 % (ref 0–0.5)
INJECT RH IG VOL PATIENT: NORMAL ML
LYMPHOCYTES # BLD AUTO: 2.4 K/UL (ref 1–4.8)
LYMPHOCYTES NFR BLD: 28.9 % (ref 18–48)
MCH RBC QN AUTO: 22.8 PG (ref 27–31)
MCHC RBC AUTO-ENTMCNC: 28.4 G/DL (ref 32–36)
MCV RBC AUTO: 80 FL (ref 82–98)
MONOCYTES # BLD AUTO: 0.7 K/UL (ref 0.3–1)
MONOCYTES NFR BLD: 8.5 % (ref 4–15)
NEUTROPHILS # BLD AUTO: 5.2 K/UL (ref 1.8–7.7)
NEUTROPHILS NFR BLD: 61.8 % (ref 38–73)
NRBC BLD-RTO: 0 /100 WBC
PLATELET # BLD AUTO: 226 K/UL (ref 150–350)
PMV BLD AUTO: 11.9 FL (ref 9.2–12.9)
RBC # BLD AUTO: 3.82 M/UL (ref 4–5.4)
TSH SERPL DL<=0.005 MIU/L-ACNC: 1.65 UIU/ML (ref 0.4–4)
WBC # BLD AUTO: 8.34 K/UL (ref 3.9–12.7)

## 2020-05-13 PROCEDURE — 99213 PR OFFICE/OUTPT VISIT, EST, LEVL III, 20-29 MIN: ICD-10-PCS | Mod: S$PBB,TH,, | Performed by: OBSTETRICS & GYNECOLOGY

## 2020-05-13 PROCEDURE — 99999 PR PBB SHADOW E&M-EST. PATIENT-LVL II: CPT | Mod: PBBFAC,,, | Performed by: OBSTETRICS & GYNECOLOGY

## 2020-05-13 PROCEDURE — 87088 URINE BACTERIA CULTURE: CPT

## 2020-05-13 PROCEDURE — 85060 PATHOLOGIST INTERPRETATION KBT: ICD-10-PCS | Mod: ,,, | Performed by: PATHOLOGY

## 2020-05-13 PROCEDURE — 99213 OFFICE O/P EST LOW 20 MIN: CPT | Mod: S$PBB,TH,, | Performed by: OBSTETRICS & GYNECOLOGY

## 2020-05-13 PROCEDURE — 86703 HIV-1/HIV-2 1 RESULT ANTBDY: CPT

## 2020-05-13 PROCEDURE — 63600519 RHOGAM PHARM REV CODE 636 ALT 250 W HCPCS: Performed by: EMERGENCY MEDICINE

## 2020-05-13 PROCEDURE — 99999 PR PBB SHADOW E&M-EST. PATIENT-LVL II: ICD-10-PCS | Mod: PBBFAC,,, | Performed by: OBSTETRICS & GYNECOLOGY

## 2020-05-13 PROCEDURE — 99284 EMERGENCY DEPT VISIT MOD MDM: CPT | Mod: 25,27

## 2020-05-13 PROCEDURE — 99283 PR EMERGENCY DEPT VISIT,LEVEL III: ICD-10-PCS | Mod: ,,, | Performed by: OBSTETRICS & GYNECOLOGY

## 2020-05-13 PROCEDURE — 87186 SC STD MICRODIL/AGAR DIL: CPT

## 2020-05-13 PROCEDURE — 85060 BLOOD SMEAR INTERPRETATION: CPT | Mod: ,,, | Performed by: PATHOLOGY

## 2020-05-13 PROCEDURE — 86762 RUBELLA ANTIBODY: CPT

## 2020-05-13 PROCEDURE — 99283 EMERGENCY DEPT VISIT LOW MDM: CPT | Mod: ,,, | Performed by: OBSTETRICS & GYNECOLOGY

## 2020-05-13 PROCEDURE — 87086 URINE CULTURE/COLONY COUNT: CPT

## 2020-05-13 PROCEDURE — 86850 RBC ANTIBODY SCREEN: CPT

## 2020-05-13 PROCEDURE — 83036 HEMOGLOBIN GLYCOSYLATED A1C: CPT

## 2020-05-13 PROCEDURE — 63600175 PHARM REV CODE 636 W HCPCS: Performed by: EMERGENCY MEDICINE

## 2020-05-13 PROCEDURE — 85460 HEMOGLOBIN FETAL: CPT

## 2020-05-13 PROCEDURE — 85025 COMPLETE CBC W/AUTO DIFF WBC: CPT

## 2020-05-13 PROCEDURE — 96372 THER/PROPH/DIAG INJ SC/IM: CPT | Mod: 59

## 2020-05-13 PROCEDURE — 86592 SYPHILIS TEST NON-TREP QUAL: CPT

## 2020-05-13 PROCEDURE — 99212 OFFICE O/P EST SF 10 MIN: CPT | Mod: PBBFAC,TH,PO,25 | Performed by: OBSTETRICS & GYNECOLOGY

## 2020-05-13 PROCEDURE — 87077 CULTURE AEROBIC IDENTIFY: CPT

## 2020-05-13 PROCEDURE — 86901 BLOOD TYPING SEROLOGIC RH(D): CPT

## 2020-05-13 PROCEDURE — 87340 HEPATITIS B SURFACE AG IA: CPT

## 2020-05-13 PROCEDURE — 85461 HEMOGLOBIN FETAL: CPT

## 2020-05-13 PROCEDURE — 84443 ASSAY THYROID STIM HORMONE: CPT

## 2020-05-13 PROCEDURE — 90686 IIV4 VACC NO PRSV 0.5 ML IM: CPT | Mod: PBBFAC,PO

## 2020-05-13 PROCEDURE — 96374 THER/PROPH/DIAG INJ IV PUSH: CPT

## 2020-05-13 PROCEDURE — 36415 COLL VENOUS BLD VENIPUNCTURE: CPT

## 2020-05-13 PROCEDURE — 86850 RBC ANTIBODY SCREEN: CPT | Mod: 91

## 2020-05-13 RX ORDER — METRONIDAZOLE 500 MG/1
500 TABLET ORAL EVERY 12 HOURS
Qty: 14 TABLET | Refills: 0 | Status: SHIPPED | OUTPATIENT
Start: 2020-05-13 | End: 2020-05-20

## 2020-05-13 RX ORDER — CEPHALEXIN 500 MG/1
500 CAPSULE ORAL EVERY 12 HOURS
Qty: 14 CAPSULE | Refills: 0 | Status: SHIPPED | OUTPATIENT
Start: 2020-05-13 | End: 2020-05-20

## 2020-05-13 RX ORDER — ONDANSETRON 4 MG/1
4 TABLET, ORALLY DISINTEGRATING ORAL
Status: DISCONTINUED | OUTPATIENT
Start: 2020-05-13 | End: 2020-05-13

## 2020-05-13 RX ORDER — ONDANSETRON 2 MG/ML
4 INJECTION INTRAMUSCULAR; INTRAVENOUS
Status: COMPLETED | OUTPATIENT
Start: 2020-05-13 | End: 2020-05-13

## 2020-05-13 RX ADMIN — HUMAN RHO(D) IMMUNE GLOBULIN 300 MCG: 300 INJECTION, SOLUTION INTRAMUSCULAR at 11:05

## 2020-05-13 RX ADMIN — ONDANSETRON 4 MG: 2 INJECTION, SOLUTION INTRAMUSCULAR; INTRAVENOUS at 11:05

## 2020-05-13 NOTE — PROGRESS NOTES
14w1d  No complaints. Nausea and vomiting improving. ED twice this month for dehydration. Re-discussed food choices and hydration.  Denies vaginal bleeding or pelvic pain. Desires flu shot today.  Still has some discharge without itching or burning. Last swab positive for yeast and BV- unable to tolerate Flagyl at that time.  Completed second Terazol treatment. Will do Flagyl now that nausea/vomiting improved. Repeat vaginosis screen at next visit. Restart probiotic.  Urine showed nitrites and leukocytes today with increased frequency. Will send urine culture and treat with Keflex for UTI.  Lab only macey blood typing last time so will reorder needed prenatal labs with [id number] today.  MFM appointment scheduled 6/9/20 and desires genetic testing.   RTC 4 weeks

## 2020-05-14 ENCOUNTER — TELEPHONE (OUTPATIENT)
Dept: MATERNAL FETAL MEDICINE | Facility: CLINIC | Age: 25
End: 2020-05-14

## 2020-05-14 ENCOUNTER — PATIENT MESSAGE (OUTPATIENT)
Dept: OBSTETRICS AND GYNECOLOGY | Facility: CLINIC | Age: 25
End: 2020-05-14

## 2020-05-14 DIAGNOSIS — O03.9 SPONTANEOUS PREGNANCY LOSS: Primary | ICD-10-CM

## 2020-05-14 DIAGNOSIS — O36.80X0 ENCOUNTER TO DETERMINE FETAL VIABILITY OF PREGNANCY, SINGLE OR UNSPECIFIED FETUS: ICD-10-CM

## 2020-05-14 LAB
HBV SURFACE AG SERPL QL IA: NEGATIVE
HIV 1+2 AB+HIV1 P24 AG SERPL QL IA: NEGATIVE
RPR SER QL: NORMAL
RUBV IGG SER-ACNC: 48 IU/ML
RUBV IGG SER-IMP: REACTIVE

## 2020-05-14 NOTE — TELEPHONE ENCOUNTER
MILENA pt went to hospital last night because spotting seemed to increase a little bit. She was examined and sent home.

## 2020-05-14 NOTE — HPI
Yamel Toribio is a 24 y.o. [id number] at 14w1d presents complaining of spotting since early this afternoon. She notes that the spotting has seemed to get heavier as the afternoon has gone on.   This IUP is complicated by Rh negative status, h/o LTCS x1 for NRFHT and h/o 19 week loss in December of this past year. She was also recently diagnosed with BV and a UTI for which she was prescribed keflex and flagyl, but she has not started these medications yet.   She denies lightheadedness, dizzyness, SOB, CP, abdominal cramping or pain, or any symptoms other than the light bleeding.

## 2020-05-14 NOTE — ASSESSMENT & PLAN NOTE
Assessment:  Patient presents with vaginal bleeding over the past 5-6 hours. Has a h/o 2nd trimester loss at 19 weeks. Recently dx with UTI/ BV, has not started medications yet. Only complaint at this time is light vaginal spotting. Possible premature cervical dilation vs threatened ab    Plan;  Based on SVE of 1cm dilated, will plan to have patient f/u with MFM and primary OB for management of premature cervical dilation.   Patient blood type is B negative- will give rhogam  KB collected  Patient counseled on risk of miscarriage with bleeding and cervical dilation at this point in pregnancy. No s/s of acute anemia at this time, but patient has chronic DAVINA and counseled on bleeding precautions as well.

## 2020-05-14 NOTE — ED PROVIDER NOTES
"Encounter Date: 2020    SCRIBE #1 NOTE: I, Erica Bird, am scribing for, and in the presence of, Dr. Espinosa.       History     Chief Complaint   Patient presents with    Vaginal Bleeding     has been spotting since 11 am.  Pateint is 14 weeks pregnant      24-year-old female  AB2 at 14 weeks of pregnancy presents with complaint of vaginal bleeding x1 day.  Patient states she was seen by her OBGYN Dr. Gregory earlier today, but did not have a pelvic exam.  After leaving the office she began to have vaginal bleeding which is described as "light spotting" lighter than a regular period.  There is some mild associated cramping.  She was told to come to the OB ED, but was sent to the regular ER since she is only 14 weeks gestation.        Review of patient's allergies indicates:  No Known Allergies  Past Medical History:   Diagnosis Date    Anemia     Iron deficiency    Asthma      Past Surgical History:   Procedure Laterality Date     SECTION  2018    [facility] (FTP & NRFHT's)     Family History   Problem Relation Age of Onset    No Known Problems Father     No Known Problems Mother     No Known Problems Brother      Social History     Tobacco Use    Smoking status: Former Smoker    Smokeless tobacco: Never Used   Substance Use Topics    Alcohol use: No    Drug use: No     Review of Systems   Constitutional: Negative for chills and fever.   HENT: Negative for congestion and sore throat.    Eyes: Negative for visual disturbance.   Respiratory: Negative for cough and shortness of breath.    Cardiovascular: Negative for chest pain and palpitations.   Gastrointestinal: Negative for abdominal pain, diarrhea and vomiting.   Genitourinary: Positive for pelvic pain (Mild) and vaginal bleeding. Negative for decreased urine volume, dysuria and vaginal discharge.   Musculoskeletal: Negative for joint swelling, neck pain and neck stiffness.   Skin: Negative for rash and wound.   Neurological: Negative " for weakness, numbness and headaches.   Psychiatric/Behavioral: Negative for confusion.       Physical Exam     Initial Vitals [05/13/20 2100]   BP Pulse Resp Temp SpO2   (!) 111/57 85 16 98.6 °F (37 °C) 100 %      MAP       --         Physical Exam    Nursing note and vitals reviewed.  Constitutional: She appears well-developed and well-nourished. No distress.   HENT:   Head: Normocephalic and atraumatic.   Mouth/Throat: Oropharynx is clear and moist. No oropharyngeal exudate.   Eyes: Conjunctivae and EOM are normal. Pupils are equal, round, and reactive to light.   Neck: Neck supple.   Cardiovascular: Normal rate and normal heart sounds.   No murmur heard.  Pulmonary/Chest: Breath sounds normal. No respiratory distress. She has no wheezes. She has no rhonchi. She has no rales.   Abdominal: Soft. There is no tenderness. There is no rebound and no guarding.   Musculoskeletal: She exhibits no edema or tenderness.   Neurological: She is alert and oriented to person, place, and time. She has normal strength. No cranial nerve deficit or sensory deficit. GCS score is 15. GCS eye subscore is 4. GCS verbal subscore is 5. GCS motor subscore is 6.   Skin: Skin is warm and dry. No rash noted.   Psychiatric: She has a normal mood and affect. Thought content normal.         ED Course   Procedures  Labs Reviewed   GROUP & RH   INDIRECT ANTIGLOBULIN TEST   SONNY - [facility] (FETAL BLEED SCREEN)   KLEIHAUER-BETKE STAIN   PREPARE RH IMMUNE GLOBULIN          Imaging Results    None          Medical Decision Making:   ED Management:  Emergent evaluation a 24-year-old female with complaint of vaginal bleeding 14 week pregnancy.  She had a recent miscarriage at 19 weeks of pregnancy, and is justifiably quite concerned.  Vital signs are benign, afebrile.  On exam abdomen soft with no tenderness.  Pelvic exam is deferred to the OB team as patient is Rh negative and quantity of bleeding is important for RhoGAM determination.  They do  recommend RhoGAM after their exam.  Also on exam cervix is 1 cm dilated, she will follow with MFM.  This is given in the ED and she is discharged in good condition.            Scribe Attestation:   Scribe #1: I performed the above scribed service and the documentation accurately describes the services I performed. I attest to the accuracy of the note.    Attending Attestation:           Physician Attestation for Scribe:  Physician Attestation Statement for Scribe #1: I, Dr. Espinosa, reviewed documentation, as scribed by Erica Bird in my presence, and it is both accurate and complete.                               Clinical Impression:     1. Vaginal bleeding in pregnancy    2. Rh negative status during pregnancy in second trimester                ED Disposition Condition    Discharge Stable        ED Prescriptions     None        Follow-up Information     Follow up With Specialties Details Why Contact Info Additional Information    [facility] [address] Obstetrics and Gynecology  Dr. Gregory and other follow-up per OB [address] [facility] [address]  [facility] [address] [phone] OB GYN - [facility], 4th Floor Please park in [place] and use [place] elevators    [facility]  As needed, If symptoms worsen [address] [facility] [address] [facility] [address] [phone]                                      Christianne Espinosa MD  05/15/20 0019

## 2020-05-14 NOTE — CONSULTS
[facility]  Obstetrics  Consult Note    Patient Name: Yamel Toribio  MRN: [id number]  Admission Date: 2020  Hospital Length of Stay: 0 days  Code Status: Prior  Primary Care Provider: Eddie Brito MD  Principal Problem: <principal problem not specified>    Inpatient consult to Obstetrics / Gynecology  Consult performed by: Carli Khanna MD  Consult ordered by: Christianne Espinosa MD        Subjective:     Principal Problem:<principal problem not specified>    History of Present Illness:  Yamel Toribio is a 24 y.o. [id number] at 14w1d presents complaining of spotting since early this afternoon. She notes that the spotting has seemed to get heavier as the afternoon has gone on.   This IUP is complicated by Rh negative status, h/o LTCS x1 for NRFHT and h/o 19 week loss in December of this past year. She was also recently diagnosed with BV and a UTI for which she was prescribed keflex and flagyl, but she has not started these medications yet.   She denies lightheadedness, dizzyness, SOB, CP, abdominal cramping or pain, or any symptoms other than the light bleeding.       OB History    Para Term  AB Living   4 1 1 0 2 1   SAB TAB Ectopic Multiple Live Births   0 1 0 0 1      # Outcome Date GA Lbr Lewis/2nd Weight Sex Delivery Anes PTL Lv   4 Current            3 AB 12/10/19 18w5d    SAB None Y FD   2 TAB            1 Term  40w1d   F CS-LTranv EPI  TAHMINA      Complications:  labor in third trimester with term delivery, fetus 1     Past Medical History:   Diagnosis Date    Anemia     Iron deficiency    Asthma      Past Surgical History:   Procedure Laterality Date     SECTION  2018    [facility] (FTP & NRFHT's)         (Not in a hospital admission)    Review of patient's allergies indicates:  No Known Allergies     Family History     Problem Relation (Age of Onset)    No Known Problems Father, Mother, Brother        Tobacco Use    Smoking status: Former Smoker     Smokeless tobacco: Never Used   Substance and Sexual Activity    Alcohol use: No    Drug use: No    Sexual activity: Yes     Partners: Male     Birth control/protection: None     Review of Systems   Constitutional: Negative for chills, fatigue and fever.   HENT: Negative for nasal congestion.    Eyes: Negative for visual disturbance.   Respiratory: Negative for cough and shortness of breath.    Cardiovascular: Negative for chest pain and leg swelling.   Gastrointestinal: Negative for abdominal pain, nausea and vomiting.   Endocrine: Negative for hot flashes.   Genitourinary: Positive for vaginal bleeding. Negative for dysuria and vaginal discharge.   Musculoskeletal: Negative for back pain.   Integumentary:  Negative for rash, breast skin changes and breast tenderness.   Neurological: Negative for headaches.   Psychiatric/Behavioral: Negative for depression.   Breast: Negative for skin changes and tenderness     Objective:     Vital Signs (Most Recent):  Temp: 98.6 °F (37 °C) (05/13/20 2100)  Pulse: 85 (05/13/20 2100)  Resp: 16 (05/13/20 2100)  BP: (!) 111/57 (05/13/20 2100)  SpO2: 100 % (05/13/20 2100) Vital Signs (24h Range):  Temp:  [98.6 °F (37 °C)] 98.6 °F (37 °C)  Pulse:  [85] 85  Resp:  [16] 16  SpO2:  [100 %] 100 %  BP: (111-122)/(57-60) 111/57     Weight: 84.4 kg (186 lb)  Body mass index is 31.93 kg/m².    FHT confirmed while in the ED    Physical Exam:   Constitutional: She is oriented to person, place, and time. She appears well-developed.    HENT:   Head: Normocephalic and atraumatic.       Pulmonary/Chest: Effort normal.        Abdominal: She exhibits no mass. There is no hepatosplenomegaly. There is no tenderness. No hernia.     Genitourinary: Vagina normal and uterus normal. Uterus is not enlarged and not tender. Cervix is normal. Right adnexum displays no tenderness and no fullness. Left adnexum displays no tenderness and no fullness. No vaginal discharge found.   Genitourinary Comments: SSE:  cervical os visibly dilated to approximately 1cm. Scant aomunt of blood tinged mucous coming from the os. Cervix normal appearing, non erythematous.   SVE: 4                Neurological: She is alert and oriented to person, place, and time.     Psychiatric: She has a normal mood and affect.         Significant Labs:  Lab Results   Component Value Date    GROUPTRH B NEG 12/10/2019    HEPBSAG Negative 2019       CBC:   Recent Labs   Lab 20  1028   WBC 8.34   RBC 3.82*   HGB 8.7*   HCT 30.6*      MCV 80*   MCH 22.8*   MCHC 28.4*     I have personallly reviewed all pertinent lab results from the last 24 hours.    Assessment/Plan:     24 y.o. female  at 14w1d for:    Threatened  in second trimester  Assessment:  Patient presents with vaginal bleeding over the past 5-6 hours. Has a h/o 2nd trimester loss at 19 weeks. Recently dx with UTI/ BV, has not started medications yet. Only complaint at this time is light vaginal spotting. Possible premature cervical dilation vs threatened ab    Plan;  Based on SVE of 1cm dilated, will plan to have patient f/u with MFM and primary OB for management of premature cervical dilation.   Patient blood type is B negative- will give rhogam  KB collected  Patient counseled on risk of miscarriage with bleeding and cervical dilation at this point in pregnancy. No s/s of acute anemia at this time, but patient has chronic DAVINA and counseled on bleeding precautions as well.         Thank you for your consult. I will sign off. Please contact us if you have any additional questions.    Carli Khanna MD  Obstetrics & Gynecology  [facility]

## 2020-05-14 NOTE — TELEPHONE ENCOUNTER
Message left for pt to call MFM clinic at [phone] regarding MFM consult appointment. Patient was also sent a portal message.    Appointment tentatively scheduled for 5/21/20 at 1pm in MFM clinic at [facility].

## 2020-05-14 NOTE — SUBJECTIVE & OBJECTIVE
OB History    Para Term  AB Living   4 1 1 0 2 1   SAB TAB Ectopic Multiple Live Births   0 1 0 0 1      # Outcome Date GA Lbr Lewis/2nd Weight Sex Delivery Anes PTL Lv   4 Current            3 AB 12/10/19 18w5d    SAB None Y FD   2 TAB            1 Term  40w1d   F CS-LTranv EPI  TAHMINA      Complications:  labor in third trimester with term delivery, fetus 1     Past Medical History:   Diagnosis Date    Anemia     Iron deficiency    Asthma      Past Surgical History:   Procedure Laterality Date     SECTION  2018    [facility] (FTP & NRFHT's)         (Not in a hospital admission)    Review of patient's allergies indicates:  No Known Allergies     Family History     Problem Relation (Age of Onset)    No Known Problems Father, Mother, Brother        Tobacco Use    Smoking status: Former Smoker    Smokeless tobacco: Never Used   Substance and Sexual Activity    Alcohol use: No    Drug use: No    Sexual activity: Yes     Partners: Male     Birth control/protection: None     Review of Systems   Constitutional: Negative for chills, fatigue and fever.   HENT: Negative for nasal congestion.    Eyes: Negative for visual disturbance.   Respiratory: Negative for cough and shortness of breath.    Cardiovascular: Negative for chest pain and leg swelling.   Gastrointestinal: Negative for abdominal pain, nausea and vomiting.   Endocrine: Negative for hot flashes.   Genitourinary: Positive for vaginal bleeding. Negative for dysuria and vaginal discharge.   Musculoskeletal: Negative for back pain.   Integumentary:  Negative for rash, breast skin changes and breast tenderness.   Neurological: Negative for headaches.   Psychiatric/Behavioral: Negative for depression.   Breast: Negative for skin changes and tenderness     Objective:     Vital Signs (Most Recent):  Temp: 98.6 °F (37 °C) (20)  Pulse: 85 (20)  Resp: 16 (20)  BP: (!) 111/57 (20)  SpO2: 100 %  (05/13/20 2100) Vital Signs (24h Range):  Temp:  [98.6 °F (37 °C)] 98.6 °F (37 °C)  Pulse:  [85] 85  Resp:  [16] 16  SpO2:  [100 %] 100 %  BP: (111-122)/(57-60) 111/57     Weight: 84.4 kg (186 lb)  Body mass index is 31.93 kg/m².    FHT confirmed while in the ED    Physical Exam:   Constitutional: She is oriented to person, place, and time. She appears well-developed.    HENT:   Head: Normocephalic and atraumatic.       Pulmonary/Chest: Effort normal.        Abdominal: She exhibits no mass. There is no hepatosplenomegaly. There is no tenderness. No hernia.     Genitourinary: Vagina normal and uterus normal. Uterus is not enlarged and not tender. Cervix is normal. Right adnexum displays no tenderness and no fullness. Left adnexum displays no tenderness and no fullness. No vaginal discharge found.   Genitourinary Comments: SSE: cervical os visibly dilated to approximately 1cm. Scant aomunt of blood tinged mucous coming from the os. Cervix normal appearing, non erythematous.   SVE: 1/30/-4                Neurological: She is alert and oriented to person, place, and time.     Psychiatric: She has a normal mood and affect.         Significant Labs:  Lab Results   Component Value Date    [facility] NEG 12/10/2019    HEPBSAG Negative 12/06/2019       CBC:   Recent Labs   Lab 05/13/20  1028   WBC 8.34   RBC 3.82*   HGB 8.7*   HCT 30.6*      MCV 80*   MCH 22.8*   MCHC 28.4*     I have personallly reviewed all pertinent lab results from the last 24 hours.

## 2020-05-15 ENCOUNTER — TELEPHONE (OUTPATIENT)
Dept: MATERNAL FETAL MEDICINE | Facility: CLINIC | Age: 25
End: 2020-05-15

## 2020-05-15 ENCOUNTER — PATIENT MESSAGE (OUTPATIENT)
Dept: OBSTETRICS AND GYNECOLOGY | Facility: CLINIC | Age: 25
End: 2020-05-15

## 2020-05-15 LAB
FETAL RBC/1000 RBC BLD KLEIH BETKE-RTO: NORMAL
PATHOLOGIST INTERPRETATION KBT: NORMAL

## 2020-05-15 NOTE — TELEPHONE ENCOUNTER
Patient was called and rescheduled to earlier date per Siddhartha.        ----- Message from Adriana Carl MD sent at 5/15/2020  8:58 AM CDT -----  Essie-  Please move this patient's consult to earlier in the week.  ThanksSelina  ----- Message -----  From: Carli Khanna MD  Sent: 5/14/2020   5:30 PM CDT  To: Essie Morales RN, Adriana Carl MD, #    It really appeared to be more like blood tinged mucous, there was no active bleeding and no blood in the vault.   For the 19 week loss, it appears she presented to the ER with abdominal pain and contractions, SROMd on admission to the ED and promptly delivered.   She is dilated 1cm and if her spotting has stopped she was seen in the ED I think it would be reasonable to have MFM see her to assess for a physical exam indicated cerclage.     Thanks,   Mana      ----- Message -----  From: Adriana Carl MD  Sent: 5/14/2020   9:06 AM CDT  To: Essie Morales RN, Carli Khanna MD, #    Mana,  Could you please provide more information? Are you wanting us to assess for a cerclage? If she is bleeding, she would not be a candidate right now with active bleeding.  Was the 19 week loss an IUFD that she miscarried or was it due to cervical insufficiency and then the fetus passed away?  Please let us know as this will help us determine how urgently she needs to be scheduled. We will need more information before we have our staff contact the patient. Please get in touch with us urgently so we do not delay in scheduling this patient for the appropriate follow-up.  Thank you,  Selina  ----- Message -----  From: Essie Morales RN  Sent: 5/14/2020   8:48 AM CDT  To: Adriana Carl MD, Carli Khanna MD, #    Amos Khanna,    Can you enter [facility] orders for consult and U/S, we will get the patient scheduled for next week      ----- Message -----  From: Carli Khanna MD  Sent: 5/13/2020  11:05 PM CDT  To: Adriana Carl MD, Siddhartha Wright Staff    This  patient is a  at 14w1d who was seen in the [facility] ED today with complaints of vaginal spotting for about a day but no pain/pressure symptoms. On exam she had a minimal amount of blood tinged mucous at her cervical os, and was digitally 1cm dilated. She has an appointment with [facility] on 20 due to 2 prior missed abs (one at 19 weeks) but may need follow up sooner based on current cervical dilation picture.     Thanks,     Carli Khanna M.D.   OB/GYN  PGY-1

## 2020-05-16 LAB — BACTERIA UR CULT: ABNORMAL

## 2020-05-18 ENCOUNTER — INITIAL CONSULT (OUTPATIENT)
Dept: MATERNAL FETAL MEDICINE | Facility: CLINIC | Age: 25
End: 2020-05-18
Payer: MEDICAID

## 2020-05-18 ENCOUNTER — PROCEDURE VISIT (OUTPATIENT)
Dept: MATERNAL FETAL MEDICINE | Facility: CLINIC | Age: 25
End: 2020-05-18
Payer: MEDICAID

## 2020-05-18 VITALS
DIASTOLIC BLOOD PRESSURE: 78 MMHG | WEIGHT: 181.88 LBS | BODY MASS INDEX: 31.05 KG/M2 | HEIGHT: 64 IN | SYSTOLIC BLOOD PRESSURE: 136 MMHG

## 2020-05-18 DIAGNOSIS — O36.80X0 ENCOUNTER TO DETERMINE FETAL VIABILITY OF PREGNANCY, SINGLE OR UNSPECIFIED FETUS: ICD-10-CM

## 2020-05-18 DIAGNOSIS — Z98.891 PREVIOUS CESAREAN SECTION: Chronic | ICD-10-CM

## 2020-05-18 DIAGNOSIS — O03.9 SPONTANEOUS PREGNANCY LOSS: ICD-10-CM

## 2020-05-18 DIAGNOSIS — O09.892 HISTORY OF PRETERM DELIVERY, CURRENTLY PREGNANT IN SECOND TRIMESTER: ICD-10-CM

## 2020-05-18 DIAGNOSIS — Z36.89 ENCOUNTER FOR FETAL ANATOMIC SURVEY: Primary | ICD-10-CM

## 2020-05-18 DIAGNOSIS — Z67.91 RH NEGATIVE STATUS DURING PREGNANCY IN SECOND TRIMESTER: ICD-10-CM

## 2020-05-18 DIAGNOSIS — O26.892 RH NEGATIVE STATUS DURING PREGNANCY IN SECOND TRIMESTER: ICD-10-CM

## 2020-05-18 PROCEDURE — 99999 PR PBB SHADOW E&M-EST. PATIENT-LVL III: CPT | Mod: PBBFAC,,, | Performed by: OBSTETRICS & GYNECOLOGY

## 2020-05-18 PROCEDURE — 76815 PR  US,PREGNANT UTERUS,LIMITED, 1/> FETUSES: ICD-10-PCS | Mod: 26,S$PBB,, | Performed by: OBSTETRICS & GYNECOLOGY

## 2020-05-18 PROCEDURE — 76815 OB US LIMITED FETUS(S): CPT | Mod: PBBFAC | Performed by: OBSTETRICS & GYNECOLOGY

## 2020-05-18 PROCEDURE — 99203 OFFICE O/P NEW LOW 30 MIN: CPT | Mod: S$PBB,TH,25, | Performed by: OBSTETRICS & GYNECOLOGY

## 2020-05-18 PROCEDURE — 99203 PR OFFICE/OUTPT VISIT, NEW, LEVL III, 30-44 MIN: ICD-10-PCS | Mod: S$PBB,TH,25, | Performed by: OBSTETRICS & GYNECOLOGY

## 2020-05-18 PROCEDURE — 99213 OFFICE O/P EST LOW 20 MIN: CPT | Mod: PBBFAC,TH,25 | Performed by: OBSTETRICS & GYNECOLOGY

## 2020-05-18 PROCEDURE — 76815 OB US LIMITED FETUS(S): CPT | Mod: 26,S$PBB,, | Performed by: OBSTETRICS & GYNECOLOGY

## 2020-05-18 PROCEDURE — 99999 PR PBB SHADOW E&M-EST. PATIENT-LVL III: ICD-10-PCS | Mod: PBBFAC,,, | Performed by: OBSTETRICS & GYNECOLOGY

## 2020-05-18 NOTE — LETTER
May 19, 2020      Adriana Charles MD  [address]  [address]  [place] [address]           [facility]  [address]  [facility] [address]  Phone: [phone]          Patient: Yamel Toribio   MR Number: [id number]   YOB: 1995   Date of Visit: 5/18/2020       Dear Dr. Adriana Charles:    Thank you for referring Yamel Toribio to me for evaluation. Attached you will find relevant portions of my assessment and plan of care.    If you have questions, please do not hesitate to call me. I look forward to following Yamel Toribio along with you.    Sincerely,    Lorin Jessica MD    Enclosure  CC:  No Recipients    If you would like to receive this communication electronically, please contact [email or web address] or [phone] to request more information on [facility] access.    For providers and/or their staff who would like to refer a patient to [facility], please contact us through our one-stop-shop provider referral line, [facility], at [phone].    If you feel you have received this communication in error or would no longer like to receive these types of communications, please e-mail [email or web address]

## 2020-05-18 NOTE — PROGRESS NOTES
Maternal Fetal Medicine consult    SUBJECTIVE:     Yamel Toribio is a 24 y.o.  female with IUP at 15w1d who is seen in consultation by MFM for evaluation and management of:  History of  delivery at 19 weeks    OB HX:  G1: C/S at term due to NRFHTs. Uncomplicated pregnancy other than  cervical dilation - was dilated 2cm at 33 weeks but then carried to term.  G2: TAB, medical at 5 weeks  G3: 2019 uncomplicated pregnancy until contractions/PPROM followed by precipitous . Per ED note, fetus had a heartbeat on U/S immediately prior to delivery. Patient states fetus was born alive but  very soon after delivery.   G4: current    Patient was seen in ED last week for vaginal bleeding. She states the bleeding was not heavy, was not bright red. No associated pain or cramping. The bleeding resolved after being seen in the ED. Per note from the ED, the patient was 1cm dilated. No problems since ED visit. Of note, patient is currently being treated for UTI, BV, and yeast. Patient received rhogam.    Past Medical History:   Diagnosis Date    Anemia     Iron deficiency    Asthma      Past Surgical History:   Procedure Laterality Date     SECTION  2018    [facility] (FTP & NRFHT's)     Family history: negative for birth defects, recurrent miscarriages, chromosomal abnormalities.   Social History     Tobacco Use    Smoking status: Former Smoker    Smokeless tobacco: Never Used   Substance Use Topics    Alcohol use: No    Drug use: No     Review of patient's allergies indicates:  No Known Allergies  Medications:  PNV   Keflex  Flagyl  Zofran   Phenergan    Aneuploidy screening:   NIPT collected  - pending    Records reviewed    Care team members:  Araceli - Primary OB     OBJECTIVE:     Blood Pressure: 136/78  Weight: 82kg    Physical Exam:  Speculum exam shows normal cervix, visually closed.  On digital exam, cervix is closed at the internal os and has normal length. External os is  dilated 0.5cm and normal for a multiparous patient.    Ultrasound performed. See viewpoint for full ultrasound report.  Cervical length normal  Small fundal subchorionic hemorrhage noted, discussed with patient       ASSESSMENT/PLAN:     24 y.o.  female with IUP at 15w1d seen by MFM for history of  delivery at 19 weeks    History of previable delivery  Per the patient report and ED notes, it appears the patient experienced  labor and PPROM. It does not appear that this was painless cervical dilation, therefore she is not a candidate for a history indicated cerclage at this time. It does not appear that this was an IUFD, therefore the patient does not need to undergo APLS testing at this time.   We discussed the various etiologies of previable delivery. Due to her history of previable delivery, she is at risk for previable and  delivery in this pregnancy. Her cervix appears normal on TA ultrasound and on exam today, which is reassuring but patient is aware that we do not perform TV assessment of the cervix typically until about 16 weeks.  We discussed the methods utilized to prevent recurrent  delivery including the different types of cerclage and modes of progesterone administration. We discussed that 17OHP is indicated in patients who have a history of  delivery in order to decrease their risk of recurrent  delivery. I reviewed the recent literature pertaining to the patient about 17OHP in this setting, and explained that while the original 17OHP studies showed great benefit in decreasing this risk, a recent study has suggested the benefit is not as significant in all patients who have a history of  delivery. I informed the patient that the alternative is to use vaginal progesterone. The patient desires 17OHP. I recommend that the primary OB arrange 17OHP injections weekly starting at 16 weeks through and including 36 weeks. If there is difficulty obtaining  the injections to begin at 16 weeks, recommend the patient utilize vaginal progesterone (200mg prometrium per vagina nightly) until the injections have been arranged.  We do not use dual forms of progesterone.  I also recommend that the patient undergo serial cervical length evaluations, every 2 weeks beginning at 16 weeks until 22 weeks 6 days. The [facility] office will arrange these ultrasounds. We discussed that if her cervix shortens, this may warrant closer surveillance or placement of a cerclage depending on the degree of shortening.  Recommend patient finish treatment for UTI, BV, and yeast infection. Infection increases the risk of  delivery.    Recommend patient begin Aspirin 81mg daily due to risk factors for pre-eclampsia. Asthma was childhood and no inhaler and no sensitivity to asa reported. No peanut allergies.    Recommend primary OB obtain hemoglobin electrophoresis and iron studies, as the patient appears to have anemia and has not previously undergone these evaluations. Patient states she is trying to be better about taking her iron supplements daily. If no evidence of iron deficiency or concerns on hgb electrophoresis, primary ob should consider sending testing for alpha thalassemia.    Primary ob to follow up on NIPT results. Primary ob should also offer patient MSAFP only. Patient declines diagnostic testing via amniocentesis.    Fetal anatomic survey at 19 to 20 week gestation.        Time spent in consultation today: 30 minutes, >50% of which was face-to-face time with the patient.    Bette Barron MD  Maternal Fetal Medicine fellow  PGY-5

## 2020-05-19 NOTE — PROGRESS NOTES
E. Coli UTI sensitive to Keflex.  Will call tomorrow to discuss MFM visit and toleration of medications.

## 2020-05-20 ENCOUNTER — TELEPHONE (OUTPATIENT)
Dept: OBSTETRICS AND GYNECOLOGY | Facility: CLINIC | Age: 25
End: 2020-05-20

## 2020-05-20 ENCOUNTER — PATIENT MESSAGE (OUTPATIENT)
Dept: OBSTETRICS AND GYNECOLOGY | Facility: CLINIC | Age: 25
End: 2020-05-20

## 2020-05-20 DIAGNOSIS — O09.212 HISTORY OF PRETERM LABOR, CURRENT PREGNANCY, SECOND TRIMESTER: Primary | ICD-10-CM

## 2020-05-20 RX ORDER — HYDROXYPROGESTERONE CAPROATE 250 MG/ML
250 INJECTION INTRAMUSCULAR
Qty: 20 ML | Refills: 0 | Status: SHIPPED | OUTPATIENT
Start: 2020-05-20 | End: 2020-10-16

## 2020-05-20 RX ORDER — PROGESTERONE 200 MG/1
200 CAPSULE ORAL NIGHTLY
Qty: 30 CAPSULE | Refills: 1 | Status: SHIPPED | OUTPATIENT
Start: 2020-05-20 | End: 2020-06-01 | Stop reason: DRUGHIGH

## 2020-05-20 NOTE — PROGRESS NOTES
MFM recommended [place] for use of prevention of  labor after 18 week loss last pregnancy. Patient will use vaginal progesterone until medication received.

## 2020-05-20 NOTE — TELEPHONE ENCOUNTER
----- Message from Adriana Charles MD sent at 5/20/2020  8:27 AM CDT -----  Please find out process for 17-OHP. Let me know what I need to do to get authorization going. I sent rx to pharmacy for vaginal progesterone and sent her a message. Can you also please see about moving her next visit up a week.    Thanks!

## 2020-05-20 NOTE — PROGRESS NOTES
Working on authorization process for 17-OHP for patient. In the interim, will use micronized progesterone vaginal starting at 16 weeks gestation. Rx sent to pharmacy.

## 2020-05-21 ENCOUNTER — TELEPHONE (OUTPATIENT)
Dept: PHARMACY | Facility: CLINIC | Age: 25
End: 2020-05-21

## 2020-05-21 NOTE — TELEPHONE ENCOUNTER
Informed Patient  that [facility] received prescription for Kenel and prior authorization is required.  [facility] will be back in touch once insurance determination is received.

## 2020-05-21 NOTE — TELEPHONE ENCOUNTER
DOCUMENTATION ONLY:  Polina is covered under the patients medicaid plan without authorization. $0 co-pay

## 2020-05-25 ENCOUNTER — PROCEDURE VISIT (OUTPATIENT)
Dept: MATERNAL FETAL MEDICINE | Facility: CLINIC | Age: 25
End: 2020-05-25
Payer: MEDICAID

## 2020-05-25 DIAGNOSIS — O03.9 SPONTANEOUS PREGNANCY LOSS: ICD-10-CM

## 2020-05-25 DIAGNOSIS — Z36.89 ENCOUNTER FOR FETAL ANATOMIC SURVEY: ICD-10-CM

## 2020-05-25 PROCEDURE — 76817 TRANSVAGINAL US OBSTETRIC: CPT | Mod: PBBFAC | Performed by: OBSTETRICS & GYNECOLOGY

## 2020-05-25 PROCEDURE — 76817 TRANSVAGINAL US OBSTETRIC: CPT | Mod: 26,S$PBB,, | Performed by: OBSTETRICS & GYNECOLOGY

## 2020-05-25 PROCEDURE — 76817 PR US, OB, TRANSVAG APPROACH: ICD-10-PCS | Mod: 26,S$PBB,, | Performed by: OBSTETRICS & GYNECOLOGY

## 2020-05-26 ENCOUNTER — TELEPHONE (OUTPATIENT)
Dept: OBSTETRICS AND GYNECOLOGY | Facility: CLINIC | Age: 25
End: 2020-05-26

## 2020-05-26 ENCOUNTER — TELEPHONE (OUTPATIENT)
Dept: PHARMACY | Facility: CLINIC | Age: 25
End: 2020-05-26

## 2020-05-26 NOTE — TELEPHONE ENCOUNTER
----- Message from Silvia Brito PharmD sent at 5/25/2020  1:28 PM CDT -----  Regarding: [facility] Rx Approval   Dear Dr. Charlse's Staff,    [facility] approved for co-pay $0. Patient aware.     Can I  Polina to your office on 5/28?     Can you confirm the office address and other specifics for the ?    Thank you,  Silvia Hogan PharmD, [facility]  Clinical Pharmacist   [facility]  Phone: [phone]

## 2020-05-26 NOTE — TELEPHONE ENCOUNTER
[place] initial fill - Patient reached in regards to [place] IM injections. She confirms two patient identifiers - name + . Polina consultation declined. Patient gives permission for [facility] to  medication to office for administration (provider administered) on . She did not have any further questions or concerns - further consult declined. She is made aware of insurance coverage - 5 doses= 35 d/s = $0 copay. She grants [facility] consent to deliver [place] to [facility] for future weekly injections. Msg sent to provider to confirm delivery address and date of 1st expected injection; office to call for appointment.     Address Confirmed with Nyla RN:  [address] [address]  [address] - [address] [place]  [id number]

## 2020-05-27 ENCOUNTER — PATIENT MESSAGE (OUTPATIENT)
Dept: OBSTETRICS AND GYNECOLOGY | Facility: CLINIC | Age: 25
End: 2020-05-27

## 2020-05-27 RX ORDER — TERCONAZOLE 4 MG/G
1 CREAM VAGINAL NIGHTLY
Qty: 45 G | Refills: 1 | Status: SHIPPED | OUTPATIENT
Start: 2020-05-27 | End: 2020-06-03

## 2020-05-30 NOTE — PROGRESS NOTES
Prenatal labs show anemia and patient reported not taking iron consistently. Will resume and repeat in 1 month along with microcytic anemia workup. Will discuss and order at appointment on Monday.

## 2020-06-01 ENCOUNTER — CLINICAL SUPPORT (OUTPATIENT)
Dept: UROLOGY | Facility: CLINIC | Age: 25
End: 2020-06-01
Payer: MEDICAID

## 2020-06-01 ENCOUNTER — ROUTINE PRENATAL (OUTPATIENT)
Dept: OBSTETRICS AND GYNECOLOGY | Facility: CLINIC | Age: 25
End: 2020-06-01
Payer: MEDICAID

## 2020-06-01 VITALS
WEIGHT: 184.19 LBS | SYSTOLIC BLOOD PRESSURE: 120 MMHG | BODY MASS INDEX: 31.62 KG/M2 | DIASTOLIC BLOOD PRESSURE: 60 MMHG

## 2020-06-01 DIAGNOSIS — N89.8 VAGINAL DISCHARGE: ICD-10-CM

## 2020-06-01 DIAGNOSIS — Z87.51 HISTORY OF PRETERM LABOR: Primary | ICD-10-CM

## 2020-06-01 DIAGNOSIS — O09.212 HISTORY OF PRETERM LABOR, CURRENT PREGNANCY, SECOND TRIMESTER: ICD-10-CM

## 2020-06-01 DIAGNOSIS — R35.0 URINARY FREQUENCY: ICD-10-CM

## 2020-06-01 DIAGNOSIS — Z3A.17 17 WEEKS GESTATION OF PREGNANCY: ICD-10-CM

## 2020-06-01 DIAGNOSIS — O99.012 ANEMIA DURING PREGNANCY IN SECOND TRIMESTER: ICD-10-CM

## 2020-06-01 DIAGNOSIS — Z34.82 ENCOUNTER FOR SUPERVISION OF OTHER NORMAL PREGNANCY, SECOND TRIMESTER: Primary | ICD-10-CM

## 2020-06-01 PROCEDURE — 99999 PR PBB SHADOW E&M-EST. PATIENT-LVL I: ICD-10-PCS | Mod: PBBFAC,,,

## 2020-06-01 PROCEDURE — 99211 OFF/OP EST MAY X REQ PHY/QHP: CPT | Mod: PBBFAC,PO,25

## 2020-06-01 PROCEDURE — 87186 SC STD MICRODIL/AGAR DIL: CPT

## 2020-06-01 PROCEDURE — 99213 PR OFFICE/OUTPT VISIT, EST, LEVL III, 20-29 MIN: ICD-10-PCS | Mod: S$PBB,TH,, | Performed by: OBSTETRICS & GYNECOLOGY

## 2020-06-01 PROCEDURE — 87801 DETECT AGNT MULT DNA AMPLI: CPT

## 2020-06-01 PROCEDURE — 87077 CULTURE AEROBIC IDENTIFY: CPT

## 2020-06-01 PROCEDURE — 99999 PR PBB SHADOW E&M-EST. PATIENT-LVL II: ICD-10-PCS | Mod: PBBFAC,,, | Performed by: OBSTETRICS & GYNECOLOGY

## 2020-06-01 PROCEDURE — 99212 OFFICE O/P EST SF 10 MIN: CPT | Mod: PBBFAC,27,TH,PO,25 | Performed by: OBSTETRICS & GYNECOLOGY

## 2020-06-01 PROCEDURE — 99213 OFFICE O/P EST LOW 20 MIN: CPT | Mod: S$PBB,TH,, | Performed by: OBSTETRICS & GYNECOLOGY

## 2020-06-01 PROCEDURE — 87086 URINE CULTURE/COLONY COUNT: CPT

## 2020-06-01 PROCEDURE — 99999 PR PBB SHADOW E&M-EST. PATIENT-LVL I: CPT | Mod: PBBFAC,,,

## 2020-06-01 PROCEDURE — 87088 URINE BACTERIA CULTURE: CPT

## 2020-06-01 PROCEDURE — 99999 PR PBB SHADOW E&M-EST. PATIENT-LVL II: CPT | Mod: PBBFAC,,, | Performed by: OBSTETRICS & GYNECOLOGY

## 2020-06-01 PROCEDURE — 87481 CANDIDA DNA AMP PROBE: CPT | Mod: 59

## 2020-06-01 PROCEDURE — 96372 THER/PROPH/DIAG INJ SC/IM: CPT | Mod: PBBFAC,PO

## 2020-06-01 PROCEDURE — 87661 TRICHOMONAS VAGINALIS AMPLIF: CPT

## 2020-06-01 RX ORDER — HYDROXYPROGESTERONE CAPROATE 1250 MG/5ML
250 INJECTION INTRAMUSCULAR
Status: DISCONTINUED | OUTPATIENT
Start: 2020-06-01 | End: 2020-10-28 | Stop reason: HOSPADM

## 2020-06-01 RX ADMIN — HYDROXYPROGESTERONE CAPROATE 250 MG: 1250 INJECTION INTRAMUSCULAR at 11:06

## 2020-06-01 NOTE — PROGRESS NOTES
Ordering Provider: Dr. Charles     Patient here to receive Hydroxyprogesterone to Left ventrogluteal. Tolerated well, no reaction noted. Instructed to wait 15 minutes after administration for monitoring.      Pre pain scale: none     Post pain scale: none

## 2020-06-01 NOTE — PROGRESS NOTES
17w1d  No complaints. Nausea and vomiting resolved. Using vaginal progesterone nightly since 15 weeks.  Vaginal discharge improving with two days of Terconazole left. Recommend to complete.  Denies vaginal bleeding, abnormal discharge or pelvic pain. M21 result still pending. Will followup.  Urine dip showed nitrite positive and leukocytes. Patient reports only some urinary frequency. Will send for urine culture.   Affirm repeated today. SSE: minimal mucus discharge in vault. Cervix closed without lesions. No bleeding ntoed.  First dose of 17-OHP today given and plan weekly through 36 weeks gestation.  MFM report reviewed from cervical screening and normal. Repeat planned next week.  Discussed low blood counts. Patient never saw Hematology as recommended in the past. Past sickle cell screen negative. Patient resuming iron daily at home. Will repeat CBC, Iron studies, Ferriting and hemoglobin electrophoresis in 2 weeks.  RTC 1 week for 17-OHP and 3 weeks for next OB appointment.  Will call with M21 result.

## 2020-06-02 LAB
BACTERIAL VAGINOSIS DNA: NEGATIVE
CANDIDA GLABRATA DNA: NEGATIVE
CANDIDA KRUSEI DNA: NEGATIVE
CANDIDA RRNA VAG QL PROBE: NEGATIVE
T VAGINALIS RRNA GENITAL QL PROBE: NEGATIVE

## 2020-06-03 DIAGNOSIS — N30.00 ACUTE CYSTITIS WITHOUT HEMATURIA: Primary | ICD-10-CM

## 2020-06-03 LAB — BACTERIA UR CULT: ABNORMAL

## 2020-06-03 RX ORDER — NITROFURANTOIN 25; 75 MG/1; MG/1
100 CAPSULE ORAL 2 TIMES DAILY
Qty: 14 CAPSULE | Refills: 0 | Status: SHIPPED | OUTPATIENT
Start: 2020-06-03 | End: 2020-06-10

## 2020-06-03 NOTE — PROGRESS NOTES
E. Coli UTI. Last treated UTI with Keflex. Will treat with Macrobid x 1 week. Rx sent to pharmacy and patient notified.

## 2020-06-03 NOTE — PROGRESS NOTES
Yamel,  Great news! Your vaginal infection screen is finally negative. Start your probiotic and continue daily. The preliminary urine shows an infection. Waiting on antibiotic test to make sure which antibiotics are best.  Dr. Charles

## 2020-06-08 ENCOUNTER — CLINICAL SUPPORT (OUTPATIENT)
Dept: UROLOGY | Facility: CLINIC | Age: 25
End: 2020-06-08
Payer: MEDICAID

## 2020-06-08 DIAGNOSIS — Z87.51 HISTORY OF PRETERM LABOR: Primary | ICD-10-CM

## 2020-06-08 PROCEDURE — 96372 THER/PROPH/DIAG INJ SC/IM: CPT | Mod: PBBFAC,PO

## 2020-06-08 RX ADMIN — HYDROXYPROGESTERONE CAPROATE 250 MG: 1250 INJECTION INTRAMUSCULAR at 09:06

## 2020-06-08 NOTE — PROGRESS NOTES
Ordering Provider: Dr. Charles     Patient here to receive Hydroxyprogesterone Caproate to Right ventrogluteal. Tolerated well, no reaction noted. Instructed to wait 15 minutes after administration for monitoring.      Pre pain scale: none     Post pain scale: none

## 2020-06-09 ENCOUNTER — PROCEDURE VISIT (OUTPATIENT)
Dept: MATERNAL FETAL MEDICINE | Facility: CLINIC | Age: 25
End: 2020-06-09
Payer: MEDICAID

## 2020-06-09 DIAGNOSIS — O09.291 HISTORY OF PREGNANCY LOSS IN PRIOR PREGNANCY, CURRENTLY PREGNANT IN FIRST TRIMESTER: ICD-10-CM

## 2020-06-09 DIAGNOSIS — Z36.89 ENCOUNTER FOR FETAL ANATOMIC SURVEY: ICD-10-CM

## 2020-06-09 DIAGNOSIS — O09.292 HISTORY OF PREGNANCY LOSS IN PRIOR PREGNANCY, CURRENTLY PREGNANT IN SECOND TRIMESTER: ICD-10-CM

## 2020-06-09 DIAGNOSIS — Z3A.10 10 WEEKS GESTATION OF PREGNANCY: ICD-10-CM

## 2020-06-09 PROCEDURE — 76817 PR US, OB, TRANSVAG APPROACH: ICD-10-PCS | Mod: 26,S$PBB,, | Performed by: OBSTETRICS & GYNECOLOGY

## 2020-06-09 PROCEDURE — 76817 TRANSVAGINAL US OBSTETRIC: CPT | Mod: 26,S$PBB,, | Performed by: OBSTETRICS & GYNECOLOGY

## 2020-06-09 PROCEDURE — 76805 OB US >/= 14 WKS SNGL FETUS: CPT | Mod: 26,S$PBB,, | Performed by: OBSTETRICS & GYNECOLOGY

## 2020-06-09 PROCEDURE — 76817 TRANSVAGINAL US OBSTETRIC: CPT | Mod: PBBFAC | Performed by: OBSTETRICS & GYNECOLOGY

## 2020-06-09 PROCEDURE — 76805 OB US >/= 14 WKS SNGL FETUS: CPT | Mod: PBBFAC | Performed by: OBSTETRICS & GYNECOLOGY

## 2020-06-09 PROCEDURE — 76805 PR US, OB 14+WKS, TRANSABD, SINGLE GESTATION: ICD-10-PCS | Mod: 26,S$PBB,, | Performed by: OBSTETRICS & GYNECOLOGY

## 2020-06-15 ENCOUNTER — CLINICAL SUPPORT (OUTPATIENT)
Dept: UROLOGY | Facility: CLINIC | Age: 25
End: 2020-06-15
Payer: MEDICAID

## 2020-06-15 DIAGNOSIS — Z87.51 HISTORY OF PRETERM LABOR: Primary | ICD-10-CM

## 2020-06-15 PROCEDURE — 96372 THER/PROPH/DIAG INJ SC/IM: CPT | Mod: PBBFAC,PO

## 2020-06-15 RX ADMIN — HYDROXYPROGESTERONE CAPROATE 250 MG: 1250 INJECTION INTRAMUSCULAR at 08:06

## 2020-06-22 ENCOUNTER — ROUTINE PRENATAL (OUTPATIENT)
Dept: OBSTETRICS AND GYNECOLOGY | Facility: CLINIC | Age: 25
End: 2020-06-22
Payer: MEDICAID

## 2020-06-22 ENCOUNTER — PROCEDURE VISIT (OUTPATIENT)
Dept: MATERNAL FETAL MEDICINE | Facility: CLINIC | Age: 25
End: 2020-06-22
Payer: MEDICAID

## 2020-06-22 ENCOUNTER — CLINICAL SUPPORT (OUTPATIENT)
Dept: UROLOGY | Facility: CLINIC | Age: 25
End: 2020-06-22
Payer: MEDICAID

## 2020-06-22 VITALS
SYSTOLIC BLOOD PRESSURE: 115 MMHG | DIASTOLIC BLOOD PRESSURE: 75 MMHG | WEIGHT: 189.38 LBS | BODY MASS INDEX: 32.51 KG/M2

## 2020-06-22 DIAGNOSIS — O03.9 SPONTANEOUS PREGNANCY LOSS: ICD-10-CM

## 2020-06-22 DIAGNOSIS — O09.212 HISTORY OF PRETERM LABOR, CURRENT PREGNANCY, SECOND TRIMESTER: Primary | ICD-10-CM

## 2020-06-22 DIAGNOSIS — Z34.82 ENCOUNTER FOR SUPERVISION OF OTHER NORMAL PREGNANCY, SECOND TRIMESTER: ICD-10-CM

## 2020-06-22 DIAGNOSIS — O99.012 MATERNAL IRON DEFICIENCY ANEMIA AFFECTING PREGNANCY IN SECOND TRIMESTER, ANTEPARTUM: ICD-10-CM

## 2020-06-22 DIAGNOSIS — Z3A.20 20 WEEKS GESTATION OF PREGNANCY: ICD-10-CM

## 2020-06-22 DIAGNOSIS — Z36.89 ENCOUNTER FOR FETAL ANATOMIC SURVEY: ICD-10-CM

## 2020-06-22 DIAGNOSIS — O09.892 H/O PRETERM DELIVERY, CURRENTLY PREGNANT, SECOND TRIMESTER: ICD-10-CM

## 2020-06-22 DIAGNOSIS — D50.9 MATERNAL IRON DEFICIENCY ANEMIA AFFECTING PREGNANCY IN SECOND TRIMESTER, ANTEPARTUM: ICD-10-CM

## 2020-06-22 DIAGNOSIS — O23.12 CYSTITIS DURING PREGNANCY IN SECOND TRIMESTER, ANTEPARTUM: ICD-10-CM

## 2020-06-22 DIAGNOSIS — Z87.51 HISTORY OF PRETERM LABOR: Primary | ICD-10-CM

## 2020-06-22 PROCEDURE — 99213 OFFICE O/P EST LOW 20 MIN: CPT | Mod: PBBFAC,TH,PO | Performed by: OBSTETRICS & GYNECOLOGY

## 2020-06-22 PROCEDURE — 99999 PR PBB SHADOW E&M-EST. PATIENT-LVL I: CPT | Mod: PBBFAC,,,

## 2020-06-22 PROCEDURE — 76816 OB US FOLLOW-UP PER FETUS: CPT | Mod: PBBFAC | Performed by: OBSTETRICS & GYNECOLOGY

## 2020-06-22 PROCEDURE — 99999 PR PBB SHADOW E&M-EST. PATIENT-LVL III: CPT | Mod: PBBFAC,,, | Performed by: OBSTETRICS & GYNECOLOGY

## 2020-06-22 PROCEDURE — 76817 PR US, OB, TRANSVAG APPROACH: ICD-10-PCS | Mod: 26,S$PBB,, | Performed by: OBSTETRICS & GYNECOLOGY

## 2020-06-22 PROCEDURE — 87086 URINE CULTURE/COLONY COUNT: CPT

## 2020-06-22 PROCEDURE — 76817 TRANSVAGINAL US OBSTETRIC: CPT | Mod: PBBFAC | Performed by: OBSTETRICS & GYNECOLOGY

## 2020-06-22 PROCEDURE — 87088 URINE BACTERIA CULTURE: CPT

## 2020-06-22 PROCEDURE — 76816 OB US FOLLOW-UP PER FETUS: CPT | Mod: 26,S$PBB,, | Performed by: OBSTETRICS & GYNECOLOGY

## 2020-06-22 PROCEDURE — 76816 PR  US,PREGNANT UTERUS,F/U,TRANSABD APP: ICD-10-PCS | Mod: 26,S$PBB,, | Performed by: OBSTETRICS & GYNECOLOGY

## 2020-06-22 PROCEDURE — 76817 TRANSVAGINAL US OBSTETRIC: CPT | Mod: 26,S$PBB,, | Performed by: OBSTETRICS & GYNECOLOGY

## 2020-06-22 PROCEDURE — 87147 CULTURE TYPE IMMUNOLOGIC: CPT

## 2020-06-22 PROCEDURE — 99999 PR PBB SHADOW E&M-EST. PATIENT-LVL I: ICD-10-PCS | Mod: PBBFAC,,,

## 2020-06-22 PROCEDURE — 99212 PR OFFICE/OUTPT VISIT, EST, LEVL II, 10-19 MIN: ICD-10-PCS | Mod: S$PBB,TH,, | Performed by: OBSTETRICS & GYNECOLOGY

## 2020-06-22 PROCEDURE — 99999 PR PBB SHADOW E&M-EST. PATIENT-LVL III: ICD-10-PCS | Mod: PBBFAC,,, | Performed by: OBSTETRICS & GYNECOLOGY

## 2020-06-22 PROCEDURE — 96372 THER/PROPH/DIAG INJ SC/IM: CPT | Mod: PBBFAC,PO

## 2020-06-22 PROCEDURE — 99212 OFFICE O/P EST SF 10 MIN: CPT | Mod: S$PBB,TH,, | Performed by: OBSTETRICS & GYNECOLOGY

## 2020-06-22 RX ADMIN — HYDROXYPROGESTERONE CAPROATE 250 MG: 1250 INJECTION INTRAMUSCULAR at 12:06

## 2020-06-22 NOTE — PROGRESS NOTES
Ordering Provider: Dr. Charles     Patient here to receive Hydroxyprogesterone to Right ventrogluteal. Tolerated well, no reaction noted. Instructed to wait 15 minutes after administration for monitoring.      Pre pain scale: none     Post pain scale: none

## 2020-06-22 NOTE — PROGRESS NOTES
20w1d  No complaints. Getting Polina weekly with no problems.  Seen by MFM today. Normal anatomy completed and cervical length was 43mm. Follow-up planned in 2 weeks.  Denies abnormal vaginal bleeding, discharge or pelvic pain.  Completed antibiotics for UTI. Will send urine culture today to make sure clear of UTI.   Anemia workup done and all iron deficiency. Reviewed results and discussed plan to see Hematology for possible iron infusions to help improve blood counts prior to delivery as already taking oral iron with little improvement.  Plan glucose screen and Type and screen for Rhogam shot with next visit.   RTC weekly for Polina and 4 weeks for routine OB visit.

## 2020-06-23 ENCOUNTER — TELEPHONE (OUTPATIENT)
Dept: OBSTETRICS AND GYNECOLOGY | Facility: CLINIC | Age: 25
End: 2020-06-23

## 2020-06-23 DIAGNOSIS — D50.9 MATERNAL IRON DEFICIENCY ANEMIA AFFECTING PREGNANCY IN SECOND TRIMESTER, ANTEPARTUM: Primary | ICD-10-CM

## 2020-06-23 DIAGNOSIS — O99.012 MATERNAL IRON DEFICIENCY ANEMIA AFFECTING PREGNANCY IN SECOND TRIMESTER, ANTEPARTUM: Primary | ICD-10-CM

## 2020-06-23 NOTE — TELEPHONE ENCOUNTER
Spoke to pharmacy. They will be shipping next batch of hydroxyprogesterone injections on 7/6/2020.

## 2020-06-24 ENCOUNTER — PATIENT MESSAGE (OUTPATIENT)
Dept: OBSTETRICS AND GYNECOLOGY | Facility: CLINIC | Age: 25
End: 2020-06-24

## 2020-06-24 DIAGNOSIS — O23.12: Primary | ICD-10-CM

## 2020-06-24 LAB — BACTERIA UR CULT: ABNORMAL

## 2020-06-24 RX ORDER — AMOXICILLIN 500 MG/1
500 TABLET, FILM COATED ORAL EVERY 12 HOURS
Qty: 14 TABLET | Refills: 0 | Status: SHIPPED | OUTPATIENT
Start: 2020-06-24 | End: 2020-07-01

## 2020-06-29 ENCOUNTER — CLINICAL SUPPORT (OUTPATIENT)
Dept: UROLOGY | Facility: CLINIC | Age: 25
End: 2020-06-29
Payer: MEDICAID

## 2020-06-29 PROCEDURE — 96372 THER/PROPH/DIAG INJ SC/IM: CPT | Mod: PBBFAC,PO

## 2020-06-29 RX ADMIN — HYDROXYPROGESTERONE CAPROATE 250 MG: 1250 INJECTION INTRAMUSCULAR at 09:06

## 2020-06-29 NOTE — PROGRESS NOTES
Patient here to receive Hydroxyprogesterone to Left ventrogluteal. Tolerated well, no reaction noted. Instructed to wait 15 minutes after administration for monitoring.      Pre pain scale: none     Post pain scale: none

## 2020-07-06 ENCOUNTER — CLINICAL SUPPORT (OUTPATIENT)
Dept: UROLOGY | Facility: CLINIC | Age: 25
End: 2020-07-06
Payer: MEDICAID

## 2020-07-06 ENCOUNTER — PROCEDURE VISIT (OUTPATIENT)
Dept: MATERNAL FETAL MEDICINE | Facility: CLINIC | Age: 25
End: 2020-07-06
Payer: MEDICAID

## 2020-07-06 DIAGNOSIS — O03.9 SPONTANEOUS PREGNANCY LOSS: ICD-10-CM

## 2020-07-06 DIAGNOSIS — O09.292 HISTORY OF PREGNANCY LOSS IN PRIOR PREGNANCY, CURRENTLY PREGNANT IN SECOND TRIMESTER: ICD-10-CM

## 2020-07-06 DIAGNOSIS — Z87.51 HISTORY OF PRETERM LABOR: Primary | ICD-10-CM

## 2020-07-06 DIAGNOSIS — Z36.89 ENCOUNTER FOR FETAL ANATOMIC SURVEY: ICD-10-CM

## 2020-07-06 PROCEDURE — 99999 PR PBB SHADOW E&M-EST. PATIENT-LVL I: ICD-10-PCS | Mod: PBBFAC,,,

## 2020-07-06 PROCEDURE — 96372 THER/PROPH/DIAG INJ SC/IM: CPT | Mod: PBBFAC,PO

## 2020-07-06 PROCEDURE — 99999 PR PBB SHADOW E&M-EST. PATIENT-LVL I: CPT | Mod: PBBFAC,,,

## 2020-07-06 PROCEDURE — 76817 TRANSVAGINAL US OBSTETRIC: CPT | Mod: 26,S$PBB,, | Performed by: OBSTETRICS & GYNECOLOGY

## 2020-07-06 PROCEDURE — 76817 TRANSVAGINAL US OBSTETRIC: CPT | Mod: PBBFAC | Performed by: OBSTETRICS & GYNECOLOGY

## 2020-07-06 PROCEDURE — 76817 PR US, OB, TRANSVAG APPROACH: ICD-10-PCS | Mod: 26,S$PBB,, | Performed by: OBSTETRICS & GYNECOLOGY

## 2020-07-06 RX ADMIN — HYDROXYPROGESTERONE CAPROATE 250 MG: 1250 INJECTION INTRAMUSCULAR at 03:07

## 2020-07-13 ENCOUNTER — CLINICAL SUPPORT (OUTPATIENT)
Dept: UROLOGY | Facility: CLINIC | Age: 25
End: 2020-07-13
Payer: MEDICAID

## 2020-07-13 DIAGNOSIS — Z87.51 HISTORY OF PRETERM LABOR: Primary | ICD-10-CM

## 2020-07-13 PROCEDURE — 96372 THER/PROPH/DIAG INJ SC/IM: CPT | Mod: PBBFAC,PO

## 2020-07-13 RX ADMIN — HYDROXYPROGESTERONE CAPROATE 250 MG: 1250 INJECTION INTRAMUSCULAR at 09:07

## 2020-07-14 ENCOUNTER — OFFICE VISIT (OUTPATIENT)
Dept: HEMATOLOGY/ONCOLOGY | Facility: CLINIC | Age: 25
End: 2020-07-14
Payer: MEDICAID

## 2020-07-14 ENCOUNTER — LAB VISIT (OUTPATIENT)
Dept: LAB | Facility: OTHER | Age: 25
End: 2020-07-14
Attending: INTERNAL MEDICINE
Payer: MEDICAID

## 2020-07-14 VITALS
TEMPERATURE: 99 F | RESPIRATION RATE: 12 BRPM | HEIGHT: 64 IN | DIASTOLIC BLOOD PRESSURE: 58 MMHG | SYSTOLIC BLOOD PRESSURE: 124 MMHG | OXYGEN SATURATION: 100 % | BODY MASS INDEX: 31.84 KG/M2 | WEIGHT: 186.5 LBS | HEART RATE: 90 BPM

## 2020-07-14 DIAGNOSIS — D50.9 MATERNAL IRON DEFICIENCY ANEMIA AFFECTING PREGNANCY IN SECOND TRIMESTER, ANTEPARTUM: Primary | ICD-10-CM

## 2020-07-14 DIAGNOSIS — D50.9 MATERNAL IRON DEFICIENCY ANEMIA AFFECTING PREGNANCY IN SECOND TRIMESTER, ANTEPARTUM: ICD-10-CM

## 2020-07-14 DIAGNOSIS — D53.9 NUTRITIONAL ANEMIA: ICD-10-CM

## 2020-07-14 DIAGNOSIS — O99.012 MATERNAL IRON DEFICIENCY ANEMIA AFFECTING PREGNANCY IN SECOND TRIMESTER, ANTEPARTUM: Primary | ICD-10-CM

## 2020-07-14 DIAGNOSIS — O99.012 MATERNAL IRON DEFICIENCY ANEMIA AFFECTING PREGNANCY IN SECOND TRIMESTER, ANTEPARTUM: ICD-10-CM

## 2020-07-14 LAB
BASOPHILS # BLD AUTO: 0.02 K/UL (ref 0–0.2)
BASOPHILS NFR BLD: 0.2 % (ref 0–1.9)
DIFFERENTIAL METHOD: ABNORMAL
EOSINOPHIL # BLD AUTO: 0.1 K/UL (ref 0–0.5)
EOSINOPHIL NFR BLD: 0.9 % (ref 0–8)
ERYTHROCYTE [DISTWIDTH] IN BLOOD BY AUTOMATED COUNT: 17.3 % (ref 11.5–14.5)
HCT VFR BLD AUTO: 25.9 % (ref 37–48.5)
HGB BLD-MCNC: 8 G/DL (ref 12–16)
IMM GRANULOCYTES # BLD AUTO: 0.03 K/UL (ref 0–0.04)
IMM GRANULOCYTES NFR BLD AUTO: 0.3 % (ref 0–0.5)
LYMPHOCYTES # BLD AUTO: 2.1 K/UL (ref 1–4.8)
LYMPHOCYTES NFR BLD: 22.6 % (ref 18–48)
MCH RBC QN AUTO: 23.9 PG (ref 27–31)
MCHC RBC AUTO-ENTMCNC: 30.9 G/DL (ref 32–36)
MCV RBC AUTO: 77 FL (ref 82–98)
MONOCYTES # BLD AUTO: 0.7 K/UL (ref 0.3–1)
MONOCYTES NFR BLD: 7.7 % (ref 4–15)
NEUTROPHILS # BLD AUTO: 6.2 K/UL (ref 1.8–7.7)
NEUTROPHILS NFR BLD: 68.3 % (ref 38–73)
NRBC BLD-RTO: 0 /100 WBC
PLATELET # BLD AUTO: 166 K/UL (ref 150–350)
PMV BLD AUTO: 11.7 FL (ref 9.2–12.9)
RBC # BLD AUTO: 3.35 M/UL (ref 4–5.4)
WBC # BLD AUTO: 9.14 K/UL (ref 3.9–12.7)

## 2020-07-14 PROCEDURE — 85025 COMPLETE CBC W/AUTO DIFF WBC: CPT

## 2020-07-14 PROCEDURE — 99999 PR PBB SHADOW E&M-EST. PATIENT-LVL IV: CPT | Mod: PBBFAC,,, | Performed by: INTERNAL MEDICINE

## 2020-07-14 PROCEDURE — 99214 OFFICE O/P EST MOD 30 MIN: CPT | Mod: PBBFAC | Performed by: INTERNAL MEDICINE

## 2020-07-14 PROCEDURE — 82607 VITAMIN B-12: CPT

## 2020-07-14 PROCEDURE — 36415 COLL VENOUS BLD VENIPUNCTURE: CPT

## 2020-07-14 PROCEDURE — 99999 PR PBB SHADOW E&M-EST. PATIENT-LVL IV: ICD-10-PCS | Mod: PBBFAC,,, | Performed by: INTERNAL MEDICINE

## 2020-07-14 PROCEDURE — 82746 ASSAY OF FOLIC ACID SERUM: CPT

## 2020-07-14 PROCEDURE — 99204 OFFICE O/P NEW MOD 45 MIN: CPT | Mod: S$PBB,,, | Performed by: INTERNAL MEDICINE

## 2020-07-14 PROCEDURE — 99204 PR OFFICE/OUTPT VISIT, NEW, LEVL IV, 45-59 MIN: ICD-10-PCS | Mod: S$PBB,,, | Performed by: INTERNAL MEDICINE

## 2020-07-14 RX ORDER — SODIUM CHLORIDE 0.9 % (FLUSH) 0.9 %
10 SYRINGE (ML) INJECTION
Status: CANCELLED | OUTPATIENT
Start: 2020-07-21

## 2020-07-14 RX ORDER — HEPARIN 100 UNIT/ML
500 SYRINGE INTRAVENOUS
Status: CANCELLED | OUTPATIENT
Start: 2020-07-21

## 2020-07-14 RX ORDER — METHYLPREDNISOLONE SOD SUCC 125 MG
125 VIAL (EA) INJECTION ONCE AS NEEDED
Status: CANCELLED | OUTPATIENT
Start: 2020-07-21

## 2020-07-14 RX ORDER — DIPHENHYDRAMINE HYDROCHLORIDE 50 MG/ML
50 INJECTION INTRAMUSCULAR; INTRAVENOUS ONCE AS NEEDED
Status: CANCELLED | OUTPATIENT
Start: 2020-07-21

## 2020-07-14 RX ORDER — EPINEPHRINE 0.3 MG/.3ML
0.3 INJECTION SUBCUTANEOUS ONCE AS NEEDED
Status: CANCELLED | OUTPATIENT
Start: 2020-07-21

## 2020-07-14 NOTE — PROGRESS NOTES
CC:  Iron deficiency anemia    HPI:  Ms Toribio is a 24 yo woman, 23-week pregnant, who presents today for further evaluation of iron deficiency anemia. Blood work on 6/15/2020 showed WBC 9.12, Hb 8.4, MCV 79, plt count 189. Ferritin 2, iron 28, TIBC 518, iron saturation 5%. She has chronic fatigue and craving for ice. +Menorrhagia before she became pregnant. 7 days periods each month, 5 heavy days. Changes pads every 1-2 hours on heavy days. Cannot tolerate daily po iron due to GI upset.       Past Medical History:   Diagnosis Date    Anemia     Iron deficiency    Asthma         Past Surgical History:   Procedure Laterality Date     SECTION  2018    [facility] (FTP & NRFHT's)       Family History   Problem Relation Age of Onset    No Known Problems Father     No Known Problems Mother     No Known Problems Brother        Social History     Socioeconomic History    Marital status: Single     Spouse name: Not on file    Number of children: Not on file    Years of education: Not on file    Highest education level: Not on file   Occupational History    Not on file   Social Needs    Financial resource strain: Not on file    Food insecurity     Worry: Not on file     Inability: Not on file    Transportation needs     Medical: Not on file     Non-medical: Not on file   Tobacco Use    Smoking status: Former Smoker    Smokeless tobacco: Never Used   Substance and Sexual Activity    Alcohol use: No    Drug use: No    Sexual activity: Yes     Partners: Male     Birth control/protection: None   Lifestyle    Physical activity     Days per week: Not on file     Minutes per session: Not on file    Stress: Not on file   Relationships    Social connections     Talks on phone: Not on file     Gets together: Not on file     Attends Sabianist service: Not on file     Active member of club or organization: Not on file     Attends meetings of clubs or organizations: Not on file     Relationship status: Not on  file   Other Topics Concern    Not on file   Social History Narrative    Not on file       Review of Systems   Constitutional: Positive for fatigue. Negative for appetite change, chills, fever and unexpected weight change.   HENT: Negative for mouth sores, nosebleeds, tinnitus, trouble swallowing and voice change.    Eyes: Negative for pain, redness and visual disturbance.   Respiratory: Negative for cough, shortness of breath and wheezing.    Cardiovascular: Negative for chest pain, palpitations and leg swelling.   Gastrointestinal: Negative for abdominal distention, abdominal pain, blood in stool, constipation, diarrhea, nausea and vomiting.   Endocrine: Negative for polydipsia, polyphagia and polyuria.   Genitourinary: Negative for flank pain, frequency and hematuria.   Musculoskeletal: Negative for arthralgias, back pain, gait problem, joint swelling, myalgias, neck pain and neck stiffness.   Skin: Negative for color change, pallor, rash and wound.   Neurological: Negative for tremors, seizures, syncope, speech difficulty, weakness, light-headedness, numbness and headaches.   Hematological: Negative for adenopathy. Does not bruise/bleed easily.   Psychiatric/Behavioral: Negative for confusion, dysphoric mood and self-injury. The patient is not nervous/anxious.    All other systems reviewed and are negative.      Objective:  Physical Exam   Constitutional: She is oriented to person, place, and time. She appears well-developed and well-nourished. No distress.   HENT:   Head: Normocephalic and atraumatic.   Mouth/Throat: Oropharynx is clear and moist. No oropharyngeal exudate.   Eyes: Pupils are equal, round, and reactive to light. EOM are normal. No scleral icterus.   +pale conjunctivae   Neck: Normal range of motion. Neck supple. No JVD present. No thyromegaly present.   Cardiovascular: Normal rate, regular rhythm, normal heart sounds and intact distal pulses. Exam reveals no gallop and no friction rub.   No  murmur heard.  Pulmonary/Chest: Effort normal and breath sounds normal. No respiratory distress. She has no wheezes. She has no rales.   Abdominal:   deferred   Musculoskeletal: Normal range of motion.         General: No tenderness, deformity or edema.   Lymphadenopathy:     She has no cervical adenopathy.        Right: No supraclavicular adenopathy present.        Left: No supraclavicular adenopathy present.   Neurological: She is alert and oriented to person, place, and time. No cranial nerve deficit. She exhibits normal muscle tone.   Skin: Skin is warm and dry. No rash noted. She is not diaphoretic. No erythema. There is pallor.   Psychiatric: She has a normal mood and affect. Her behavior is normal. Judgment and thought content normal.   Vitals reviewed.      Labs:  Blood work on 6/15/2020 showed WBC 9.12, Hb 8.4, MCV 79, plt count 189. Ferritin 2, iron 28, TIBC 518, iron saturation 5%.    Assessment and plan:    1. Maternal iron deficiency anemia affecting pregnancy in second trimester, antepartum    2. Nutritional anemia      1.2  - Ms Toribio is a 24 yo woman, 23-weeks pregnant, who presents today for further management of iron deficiency anemia. Cannot tolerate po iron due to GI upset  - discussed IV iron. Discussed side effects of Venofer which include but are not limited to infusion reaction, shortness of breath, hives, rash, flushing, itching, headaches, skin discoloration at the injection site, nausea, vomiting, low phosphorus level, elevated blood pressure, elevated liver enzymes, low phosphorus level. Patient understands the risks and agrees with proceeding with venofer  - return when approved for venofer  - check CBC, vit B12, folate today  - She is due on Nov 8. Will see her in mid Oct to monitor blood counts    Her multiple questions were answered in the clinic. Encouraged to call should issues arise.

## 2020-07-14 NOTE — Clinical Note
Check CBC, folate, B12 today. Verify with PA re venofer. Return for venofer weekly (100mg first dose, then 300 mg x 4). Check CBC, ferritin, iron on 10/12. See me on 10/13.

## 2020-07-14 NOTE — LETTER
July 14, 2020      Adriana Charles MD  [address]  [address]  [place] [address]           [facility] [place] [address]  [address], [address]  [facility] [address]  Phone: [phone]          Patient: Yamel Toribio   MR Number: [id number]   YOB: 1995   Date of Visit: 7/14/2020       Dear Dr. Adriana Charles:    Thank you for referring Yamel Toribio to me for evaluation. Attached you will find relevant portions of my assessment and plan of care.    If you have questions, please do not hesitate to call me. I look forward to following Yamel Toribio along with you.    Sincerely,    Yocasta Apodaca MD    Enclosure  CC:  No Recipients    If you would like to receive this communication electronically, please contact [email or web address] or [phone] to request more information on "[employer]" Link access.    For providers and/or their staff who would like to refer a patient to [facility], please contact us through our one-stop-shop provider referral line, [facility], at [phone].    If you feel you have received this communication in error or would no longer like to receive these types of communications, please e-mail [email or web address]

## 2020-07-15 LAB
FOLATE SERPL-MCNC: 14.1 NG/ML (ref 4–24)
VIT B12 SERPL-MCNC: 340 PG/ML (ref 210–950)

## 2020-07-20 ENCOUNTER — ROUTINE PRENATAL (OUTPATIENT)
Dept: OBSTETRICS AND GYNECOLOGY | Facility: CLINIC | Age: 25
End: 2020-07-20
Payer: MEDICAID

## 2020-07-20 ENCOUNTER — TELEPHONE (OUTPATIENT)
Dept: OBSTETRICS AND GYNECOLOGY | Facility: CLINIC | Age: 25
End: 2020-07-20

## 2020-07-20 VITALS
WEIGHT: 189.38 LBS | BODY MASS INDEX: 32.51 KG/M2 | DIASTOLIC BLOOD PRESSURE: 60 MMHG | SYSTOLIC BLOOD PRESSURE: 122 MMHG

## 2020-07-20 DIAGNOSIS — Z3A.24 24 WEEKS GESTATION OF PREGNANCY: ICD-10-CM

## 2020-07-20 DIAGNOSIS — Z87.51 HISTORY OF PRETERM LABOR: ICD-10-CM

## 2020-07-20 DIAGNOSIS — Z34.82 ENCOUNTER FOR SUPERVISION OF OTHER NORMAL PREGNANCY, SECOND TRIMESTER: Primary | ICD-10-CM

## 2020-07-20 DIAGNOSIS — O99.012 ANEMIA OF MOTHER IN PREGNANCY, ANTEPARTUM, SECOND TRIMESTER: ICD-10-CM

## 2020-07-20 DIAGNOSIS — B96.89 BACTERIAL VAGINITIS: ICD-10-CM

## 2020-07-20 DIAGNOSIS — B37.31 CANDIDAL VAGINITIS: ICD-10-CM

## 2020-07-20 DIAGNOSIS — N76.0 BACTERIAL VAGINITIS: ICD-10-CM

## 2020-07-20 PROCEDURE — 99213 OFFICE O/P EST LOW 20 MIN: CPT | Mod: S$PBB,TH,, | Performed by: OBSTETRICS & GYNECOLOGY

## 2020-07-20 PROCEDURE — 99213 PR OFFICE/OUTPT VISIT, EST, LEVL III, 20-29 MIN: ICD-10-PCS | Mod: S$PBB,TH,, | Performed by: OBSTETRICS & GYNECOLOGY

## 2020-07-20 PROCEDURE — 99999 PR PBB SHADOW E&M-EST. PATIENT-LVL III: CPT | Mod: PBBFAC,,, | Performed by: OBSTETRICS & GYNECOLOGY

## 2020-07-20 PROCEDURE — 99213 OFFICE O/P EST LOW 20 MIN: CPT | Mod: PBBFAC,TH,PO | Performed by: OBSTETRICS & GYNECOLOGY

## 2020-07-20 PROCEDURE — 87220 TISSUE EXAM FOR FUNGI: CPT | Mod: PBBFAC,PO | Performed by: OBSTETRICS & GYNECOLOGY

## 2020-07-20 PROCEDURE — 99999 PR PBB SHADOW E&M-EST. PATIENT-LVL III: ICD-10-PCS | Mod: PBBFAC,,, | Performed by: OBSTETRICS & GYNECOLOGY

## 2020-07-20 RX ORDER — METRONIDAZOLE 500 MG/1
500 TABLET ORAL EVERY 12 HOURS
Qty: 14 TABLET | Refills: 0 | Status: SHIPPED | OUTPATIENT
Start: 2020-07-20 | End: 2020-07-27

## 2020-07-20 RX ORDER — TERCONAZOLE 4 MG/G
1 CREAM VAGINAL NIGHTLY
Qty: 45 G | Refills: 1 | Status: SHIPPED | OUTPATIENT
Start: 2020-07-20 | End: 2020-07-27

## 2020-07-20 RX ADMIN — HYDROXYPROGESTERONE CAPROATE 250 MG: 1250 INJECTION INTRAMUSCULAR at 10:07

## 2020-07-20 NOTE — TELEPHONE ENCOUNTER
Pt notified Dr. Charles has not sent RX yet. It will be done by end of the day. Pt verbalized understanding.

## 2020-07-20 NOTE — TELEPHONE ENCOUNTER
----- Message from Kareen Lopez sent at 7/20/2020  2:56 PM CDT -----  Regarding: Need Prescription  Type:  New Prescription      Who Called: patient need to speak with nurse   regarding new prescriptionto be called into [facility] on [address]  Refill or New Rx:  New Prescription  RX Name and Strength:  How is the patient currently taking it? (ex. 1XDay):  Is this a 30 day or 90 day RX  Preferred Pharmacy with phone number:  [facility]  Local or Mail Order:  Ordering Provider:  Dr Charles  Would the patient rather a call back or a response via [facility]? call  Best Call Back Number:[phone]  Additional Information:

## 2020-07-20 NOTE — PROGRESS NOTES
24w1d  Patient complaining of increased vaginal discharge over past 3 days. Denies vaginal bleeding or cramping.  Saw hematology for iron-deficiency anemia with iron infusions planned soon once authorized.  Glucose test and antibody screen done today.  Doing well with weekly Polina.  SSE: clumpy white discharge in vault. Cervical os closed.  Wet prep done: Moderate clue cells. Multiple hyphae on KOH.   Rx to pharmacy for Terazol and Flagyl. Not taking probiotic but will get today.  RTC weekly through 36 weeks for Polina.  RTC for appointment in 4 weeks. Plan Rhogam shot.

## 2020-07-27 ENCOUNTER — TELEPHONE (OUTPATIENT)
Dept: OBSTETRICS AND GYNECOLOGY | Facility: CLINIC | Age: 25
End: 2020-07-27

## 2020-07-27 ENCOUNTER — TELEPHONE (OUTPATIENT)
Dept: PHARMACY | Facility: CLINIC | Age: 25
End: 2020-07-27

## 2020-07-27 NOTE — TELEPHONE ENCOUNTER
----- Message from Herve Jain sent at 7/27/2020  8:21 AM CDT -----  Patient is running late for their appointment and would like to know if they can still be seen. Patient is having car issues. [phone] Please advise.

## 2020-07-27 NOTE — TELEPHONE ENCOUNTER
Pt has a flat tire that she is trying to get fixed. Pt notified ok to come and get injection later today when she can make it. Pt verbalized understanding and will notify office if unable to make it today.

## 2020-07-28 ENCOUNTER — CLINICAL SUPPORT (OUTPATIENT)
Dept: UROLOGY | Facility: CLINIC | Age: 25
End: 2020-07-28
Payer: MEDICAID

## 2020-07-28 DIAGNOSIS — Z87.51 HISTORY OF PRETERM LABOR: Primary | ICD-10-CM

## 2020-07-28 PROCEDURE — 96372 THER/PROPH/DIAG INJ SC/IM: CPT | Mod: PBBFAC,PO

## 2020-07-28 RX ADMIN — HYDROXYPROGESTERONE CAPROATE 250 MG: 1250 INJECTION INTRAMUSCULAR at 08:07

## 2020-07-30 ENCOUNTER — INFUSION (OUTPATIENT)
Dept: INFUSION THERAPY | Facility: OTHER | Age: 25
End: 2020-07-30
Attending: INTERNAL MEDICINE
Payer: MEDICAID

## 2020-07-30 VITALS
RESPIRATION RATE: 17 BRPM | DIASTOLIC BLOOD PRESSURE: 59 MMHG | HEART RATE: 93 BPM | TEMPERATURE: 98 F | SYSTOLIC BLOOD PRESSURE: 110 MMHG | OXYGEN SATURATION: 99 %

## 2020-07-30 DIAGNOSIS — D50.9 MATERNAL IRON DEFICIENCY ANEMIA AFFECTING PREGNANCY IN SECOND TRIMESTER, ANTEPARTUM: Primary | ICD-10-CM

## 2020-07-30 DIAGNOSIS — O99.012 MATERNAL IRON DEFICIENCY ANEMIA AFFECTING PREGNANCY IN SECOND TRIMESTER, ANTEPARTUM: Primary | ICD-10-CM

## 2020-07-30 PROCEDURE — 25000003 PHARM REV CODE 250: Performed by: INTERNAL MEDICINE

## 2020-07-30 PROCEDURE — 63600175 PHARM REV CODE 636 W HCPCS: Performed by: INTERNAL MEDICINE

## 2020-07-30 PROCEDURE — 96365 THER/PROPH/DIAG IV INF INIT: CPT

## 2020-07-30 RX ORDER — SODIUM CHLORIDE 0.9 % (FLUSH) 0.9 %
10 SYRINGE (ML) INJECTION
Status: DISCONTINUED | OUTPATIENT
Start: 2020-07-30 | End: 2020-07-30 | Stop reason: HOSPADM

## 2020-07-30 RX ORDER — DIPHENHYDRAMINE HYDROCHLORIDE 50 MG/ML
50 INJECTION INTRAMUSCULAR; INTRAVENOUS ONCE AS NEEDED
Status: DISCONTINUED | OUTPATIENT
Start: 2020-07-30 | End: 2020-07-30 | Stop reason: HOSPADM

## 2020-07-30 RX ORDER — HEPARIN 100 UNIT/ML
500 SYRINGE INTRAVENOUS
Status: DISCONTINUED | OUTPATIENT
Start: 2020-07-30 | End: 2020-07-30 | Stop reason: HOSPADM

## 2020-07-30 RX ORDER — HEPARIN 100 UNIT/ML
500 SYRINGE INTRAVENOUS
Status: CANCELLED | OUTPATIENT
Start: 2020-07-30

## 2020-07-30 RX ORDER — EPINEPHRINE 0.3 MG/.3ML
0.3 INJECTION SUBCUTANEOUS ONCE AS NEEDED
Status: DISCONTINUED | OUTPATIENT
Start: 2020-07-30 | End: 2020-07-30 | Stop reason: HOSPADM

## 2020-07-30 RX ORDER — EPINEPHRINE 0.3 MG/.3ML
0.3 INJECTION SUBCUTANEOUS ONCE AS NEEDED
Status: CANCELLED | OUTPATIENT
Start: 2020-07-30

## 2020-07-30 RX ORDER — SODIUM CHLORIDE 0.9 % (FLUSH) 0.9 %
10 SYRINGE (ML) INJECTION
Status: CANCELLED | OUTPATIENT
Start: 2020-07-30

## 2020-07-30 RX ORDER — DIPHENHYDRAMINE HYDROCHLORIDE 50 MG/ML
50 INJECTION INTRAMUSCULAR; INTRAVENOUS ONCE AS NEEDED
Status: CANCELLED | OUTPATIENT
Start: 2020-07-30

## 2020-07-30 RX ORDER — METHYLPREDNISOLONE SOD SUCC 125 MG
125 VIAL (EA) INJECTION ONCE AS NEEDED
Status: DISCONTINUED | OUTPATIENT
Start: 2020-07-30 | End: 2020-07-30 | Stop reason: HOSPADM

## 2020-07-30 RX ORDER — METHYLPREDNISOLONE SOD SUCC 125 MG
125 VIAL (EA) INJECTION ONCE AS NEEDED
Status: CANCELLED | OUTPATIENT
Start: 2020-07-30

## 2020-07-30 RX ADMIN — IRON SUCROSE 100 MG: 20 INJECTION, SOLUTION INTRAVENOUS at 10:07

## 2020-07-30 RX ADMIN — SODIUM CHLORIDE: 0.9 INJECTION, SOLUTION INTRAVENOUS at 10:07

## 2020-07-30 NOTE — PLAN OF CARE
Venofer administered, no reaction. Patient tolerated well. 24 gauge IV removed, catheter intact. No apparent distress noted. Discharge instructions given to patient. Patient understands instructions. Follow up appointment scheduled.

## 2020-08-03 ENCOUNTER — CLINICAL SUPPORT (OUTPATIENT)
Dept: UROLOGY | Facility: CLINIC | Age: 25
End: 2020-08-03
Payer: MEDICAID

## 2020-08-03 DIAGNOSIS — Z87.51 HISTORY OF PRETERM LABOR: Primary | ICD-10-CM

## 2020-08-03 PROCEDURE — 96372 THER/PROPH/DIAG INJ SC/IM: CPT | Mod: PBBFAC,PO

## 2020-08-03 RX ADMIN — HYDROXYPROGESTERONE CAPROATE 250 MG: 1250 INJECTION INTRAMUSCULAR at 09:08

## 2020-08-06 ENCOUNTER — INFUSION (OUTPATIENT)
Dept: INFUSION THERAPY | Facility: OTHER | Age: 25
End: 2020-08-06
Attending: INTERNAL MEDICINE
Payer: MEDICAID

## 2020-08-06 VITALS
TEMPERATURE: 99 F | RESPIRATION RATE: 16 BRPM | HEART RATE: 96 BPM | SYSTOLIC BLOOD PRESSURE: 121 MMHG | DIASTOLIC BLOOD PRESSURE: 64 MMHG | OXYGEN SATURATION: 98 %

## 2020-08-06 DIAGNOSIS — D50.9 MATERNAL IRON DEFICIENCY ANEMIA AFFECTING PREGNANCY IN SECOND TRIMESTER, ANTEPARTUM: Primary | ICD-10-CM

## 2020-08-06 DIAGNOSIS — O99.012 MATERNAL IRON DEFICIENCY ANEMIA AFFECTING PREGNANCY IN SECOND TRIMESTER, ANTEPARTUM: Primary | ICD-10-CM

## 2020-08-06 PROCEDURE — 63600175 PHARM REV CODE 636 W HCPCS: Performed by: INTERNAL MEDICINE

## 2020-08-06 PROCEDURE — 25000003 PHARM REV CODE 250: Performed by: INTERNAL MEDICINE

## 2020-08-06 PROCEDURE — 96365 THER/PROPH/DIAG IV INF INIT: CPT

## 2020-08-06 RX ORDER — DIPHENHYDRAMINE HYDROCHLORIDE 50 MG/ML
50 INJECTION INTRAMUSCULAR; INTRAVENOUS ONCE AS NEEDED
Status: CANCELLED | OUTPATIENT
Start: 2020-08-06

## 2020-08-06 RX ORDER — SODIUM CHLORIDE 0.9 % (FLUSH) 0.9 %
10 SYRINGE (ML) INJECTION
Status: CANCELLED | OUTPATIENT
Start: 2020-08-06

## 2020-08-06 RX ORDER — DIPHENHYDRAMINE HYDROCHLORIDE 50 MG/ML
50 INJECTION INTRAMUSCULAR; INTRAVENOUS ONCE AS NEEDED
Status: DISCONTINUED | OUTPATIENT
Start: 2020-08-06 | End: 2020-08-06 | Stop reason: HOSPADM

## 2020-08-06 RX ORDER — METHYLPREDNISOLONE SOD SUCC 125 MG
125 VIAL (EA) INJECTION ONCE AS NEEDED
Status: CANCELLED | OUTPATIENT
Start: 2020-08-06

## 2020-08-06 RX ORDER — METHYLPREDNISOLONE SOD SUCC 125 MG
125 VIAL (EA) INJECTION ONCE AS NEEDED
Status: DISCONTINUED | OUTPATIENT
Start: 2020-08-06 | End: 2020-08-06 | Stop reason: HOSPADM

## 2020-08-06 RX ORDER — HEPARIN 100 UNIT/ML
500 SYRINGE INTRAVENOUS
Status: CANCELLED | OUTPATIENT
Start: 2020-08-06

## 2020-08-06 RX ORDER — EPINEPHRINE 0.3 MG/.3ML
0.3 INJECTION SUBCUTANEOUS ONCE AS NEEDED
Status: CANCELLED | OUTPATIENT
Start: 2020-08-06

## 2020-08-06 RX ORDER — EPINEPHRINE 0.3 MG/.3ML
0.3 INJECTION SUBCUTANEOUS ONCE AS NEEDED
Status: DISCONTINUED | OUTPATIENT
Start: 2020-08-06 | End: 2020-08-06 | Stop reason: HOSPADM

## 2020-08-06 RX ORDER — SODIUM CHLORIDE 0.9 % (FLUSH) 0.9 %
10 SYRINGE (ML) INJECTION
Status: DISCONTINUED | OUTPATIENT
Start: 2020-08-06 | End: 2020-08-06 | Stop reason: HOSPADM

## 2020-08-06 RX ORDER — HEPARIN 100 UNIT/ML
500 SYRINGE INTRAVENOUS
Status: DISCONTINUED | OUTPATIENT
Start: 2020-08-06 | End: 2020-08-06 | Stop reason: HOSPADM

## 2020-08-06 RX ADMIN — IRON SUCROSE 100 MG: 20 INJECTION, SOLUTION INTRAVENOUS at 10:08

## 2020-08-06 RX ADMIN — SODIUM CHLORIDE: 0.9 INJECTION, SOLUTION INTRAVENOUS at 10:08

## 2020-08-10 ENCOUNTER — CLINICAL SUPPORT (OUTPATIENT)
Dept: UROLOGY | Facility: CLINIC | Age: 25
End: 2020-08-10
Payer: MEDICAID

## 2020-08-10 DIAGNOSIS — Z87.51 HISTORY OF PRETERM LABOR: Primary | ICD-10-CM

## 2020-08-10 PROCEDURE — 96372 THER/PROPH/DIAG INJ SC/IM: CPT | Mod: PBBFAC,PO

## 2020-08-10 RX ADMIN — HYDROXYPROGESTERONE CAPROATE 250 MG: 1250 INJECTION INTRAMUSCULAR at 09:08

## 2020-08-13 ENCOUNTER — INFUSION (OUTPATIENT)
Dept: INFUSION THERAPY | Facility: OTHER | Age: 25
End: 2020-08-13
Attending: INTERNAL MEDICINE
Payer: MEDICAID

## 2020-08-13 VITALS
RESPIRATION RATE: 17 BRPM | OXYGEN SATURATION: 100 % | TEMPERATURE: 98 F | SYSTOLIC BLOOD PRESSURE: 118 MMHG | DIASTOLIC BLOOD PRESSURE: 57 MMHG | BODY MASS INDEX: 31.54 KG/M2 | WEIGHT: 184.75 LBS | HEART RATE: 84 BPM | HEIGHT: 64 IN

## 2020-08-13 DIAGNOSIS — O99.012 MATERNAL IRON DEFICIENCY ANEMIA AFFECTING PREGNANCY IN SECOND TRIMESTER, ANTEPARTUM: Primary | ICD-10-CM

## 2020-08-13 DIAGNOSIS — D50.9 MATERNAL IRON DEFICIENCY ANEMIA AFFECTING PREGNANCY IN SECOND TRIMESTER, ANTEPARTUM: Primary | ICD-10-CM

## 2020-08-13 PROCEDURE — 25000003 PHARM REV CODE 250: Performed by: INTERNAL MEDICINE

## 2020-08-13 PROCEDURE — 63600175 PHARM REV CODE 636 W HCPCS: Performed by: INTERNAL MEDICINE

## 2020-08-13 PROCEDURE — 96365 THER/PROPH/DIAG IV INF INIT: CPT

## 2020-08-13 RX ORDER — HEPARIN 100 UNIT/ML
500 SYRINGE INTRAVENOUS
Status: CANCELLED | OUTPATIENT
Start: 2020-08-13

## 2020-08-13 RX ORDER — DIPHENHYDRAMINE HYDROCHLORIDE 50 MG/ML
50 INJECTION INTRAMUSCULAR; INTRAVENOUS ONCE AS NEEDED
Status: CANCELLED | OUTPATIENT
Start: 2020-08-13

## 2020-08-13 RX ORDER — SODIUM CHLORIDE 0.9 % (FLUSH) 0.9 %
10 SYRINGE (ML) INJECTION
Status: DISCONTINUED | OUTPATIENT
Start: 2020-08-13 | End: 2020-08-13 | Stop reason: HOSPADM

## 2020-08-13 RX ORDER — EPINEPHRINE 0.3 MG/.3ML
0.3 INJECTION SUBCUTANEOUS ONCE AS NEEDED
Status: CANCELLED | OUTPATIENT
Start: 2020-08-13

## 2020-08-13 RX ORDER — SODIUM CHLORIDE 0.9 % (FLUSH) 0.9 %
10 SYRINGE (ML) INJECTION
Status: CANCELLED | OUTPATIENT
Start: 2020-08-13

## 2020-08-13 RX ORDER — DIPHENHYDRAMINE HYDROCHLORIDE 50 MG/ML
50 INJECTION INTRAMUSCULAR; INTRAVENOUS ONCE AS NEEDED
Status: DISCONTINUED | OUTPATIENT
Start: 2020-08-13 | End: 2020-08-13 | Stop reason: HOSPADM

## 2020-08-13 RX ORDER — EPINEPHRINE 0.3 MG/.3ML
0.3 INJECTION SUBCUTANEOUS ONCE AS NEEDED
Status: DISCONTINUED | OUTPATIENT
Start: 2020-08-13 | End: 2020-08-13 | Stop reason: HOSPADM

## 2020-08-13 RX ORDER — METHYLPREDNISOLONE SOD SUCC 125 MG
125 VIAL (EA) INJECTION ONCE AS NEEDED
Status: CANCELLED | OUTPATIENT
Start: 2020-08-13

## 2020-08-13 RX ORDER — METHYLPREDNISOLONE SOD SUCC 125 MG
125 VIAL (EA) INJECTION ONCE AS NEEDED
Status: DISCONTINUED | OUTPATIENT
Start: 2020-08-13 | End: 2020-08-13 | Stop reason: HOSPADM

## 2020-08-13 RX ORDER — HEPARIN 100 UNIT/ML
500 SYRINGE INTRAVENOUS
Status: DISCONTINUED | OUTPATIENT
Start: 2020-08-13 | End: 2020-08-13 | Stop reason: HOSPADM

## 2020-08-13 RX ADMIN — SODIUM CHLORIDE: 9 INJECTION, SOLUTION INTRAVENOUS at 10:08

## 2020-08-13 RX ADMIN — IRON SUCROSE 100 MG: 20 INJECTION, SOLUTION INTRAVENOUS at 10:08

## 2020-08-17 ENCOUNTER — ROUTINE PRENATAL (OUTPATIENT)
Dept: OBSTETRICS AND GYNECOLOGY | Facility: CLINIC | Age: 25
End: 2020-08-17
Payer: MEDICAID

## 2020-08-17 VITALS
WEIGHT: 188.63 LBS | DIASTOLIC BLOOD PRESSURE: 60 MMHG | SYSTOLIC BLOOD PRESSURE: 130 MMHG | BODY MASS INDEX: 32.37 KG/M2 | HEART RATE: 80 BPM

## 2020-08-17 DIAGNOSIS — Z34.83 ENCOUNTER FOR SUPERVISION OF OTHER NORMAL PREGNANCY, THIRD TRIMESTER: Primary | ICD-10-CM

## 2020-08-17 DIAGNOSIS — Z29.13 NEED FOR RHOGAM DUE TO RH NEGATIVE MOTHER: ICD-10-CM

## 2020-08-17 DIAGNOSIS — Z3A.28 28 WEEKS GESTATION OF PREGNANCY: ICD-10-CM

## 2020-08-17 PROCEDURE — 99999 PR PBB SHADOW E&M-EST. PATIENT-LVL III: CPT | Mod: PBBFAC,,, | Performed by: OBSTETRICS & GYNECOLOGY

## 2020-08-17 PROCEDURE — 99999 PR PBB SHADOW E&M-EST. PATIENT-LVL III: ICD-10-PCS | Mod: PBBFAC,,, | Performed by: OBSTETRICS & GYNECOLOGY

## 2020-08-17 PROCEDURE — 99212 OFFICE O/P EST SF 10 MIN: CPT | Mod: S$PBB,TH,, | Performed by: OBSTETRICS & GYNECOLOGY

## 2020-08-17 PROCEDURE — 99212 PR OFFICE/OUTPT VISIT, EST, LEVL II, 10-19 MIN: ICD-10-PCS | Mod: S$PBB,TH,, | Performed by: OBSTETRICS & GYNECOLOGY

## 2020-08-17 PROCEDURE — 99213 OFFICE O/P EST LOW 20 MIN: CPT | Mod: PBBFAC,TH,PO | Performed by: OBSTETRICS & GYNECOLOGY

## 2020-08-17 RX ADMIN — HYDROXYPROGESTERONE CAPROATE 250 MG: 1250 INJECTION INTRAMUSCULAR at 10:08

## 2020-08-17 NOTE — PROGRESS NOTES
28w1d  No complaints. Denies vaginal bleeding, abnormal discharge or contractions. Good fetal movement and kick counts reviewed.  Last iron infusion this week. Plan repeat level after last dose.  Normal glucose test and antibody screen.  [place] today and Rhogam shot.  RTC weekly for Polina and 2 week for OB visit.

## 2020-08-17 NOTE — NURSING
Ordering Provider: Dr. Charles     Patient here to receive hydroxyprogesterone to Right ventrogluteal and Tdap to left ventrogluteal. Tolerated well, no reaction noted. Instructed to wait 15 minutes after administration for monitoring.      Pre pain scale: none     Post pain scale: none

## 2020-08-24 ENCOUNTER — TELEPHONE (OUTPATIENT)
Dept: PHARMACY | Facility: CLINIC | Age: 25
End: 2020-08-24

## 2020-08-24 ENCOUNTER — INFUSION (OUTPATIENT)
Dept: INFUSION THERAPY | Facility: OTHER | Age: 25
End: 2020-08-24
Attending: INTERNAL MEDICINE
Payer: MEDICAID

## 2020-08-24 ENCOUNTER — CLINICAL SUPPORT (OUTPATIENT)
Dept: UROLOGY | Facility: CLINIC | Age: 25
End: 2020-08-24
Payer: MEDICAID

## 2020-08-24 VITALS
SYSTOLIC BLOOD PRESSURE: 120 MMHG | RESPIRATION RATE: 19 BRPM | TEMPERATURE: 98 F | OXYGEN SATURATION: 99 % | DIASTOLIC BLOOD PRESSURE: 55 MMHG | HEART RATE: 79 BPM

## 2020-08-24 DIAGNOSIS — D50.9 MATERNAL IRON DEFICIENCY ANEMIA AFFECTING PREGNANCY IN SECOND TRIMESTER, ANTEPARTUM: Primary | ICD-10-CM

## 2020-08-24 DIAGNOSIS — Z87.51 HISTORY OF PRETERM LABOR: Primary | ICD-10-CM

## 2020-08-24 DIAGNOSIS — O99.012 MATERNAL IRON DEFICIENCY ANEMIA AFFECTING PREGNANCY IN SECOND TRIMESTER, ANTEPARTUM: Primary | ICD-10-CM

## 2020-08-24 PROCEDURE — 25000003 PHARM REV CODE 250: Performed by: INTERNAL MEDICINE

## 2020-08-24 PROCEDURE — 96365 THER/PROPH/DIAG IV INF INIT: CPT

## 2020-08-24 PROCEDURE — 63600175 PHARM REV CODE 636 W HCPCS: Performed by: INTERNAL MEDICINE

## 2020-08-24 PROCEDURE — 96372 THER/PROPH/DIAG INJ SC/IM: CPT | Mod: PBBFAC,PO

## 2020-08-24 PROCEDURE — 96366 THER/PROPH/DIAG IV INF ADDON: CPT

## 2020-08-24 RX ORDER — HEPARIN 100 UNIT/ML
500 SYRINGE INTRAVENOUS
Status: DISCONTINUED | OUTPATIENT
Start: 2020-08-24 | End: 2020-08-24 | Stop reason: HOSPADM

## 2020-08-24 RX ORDER — EPINEPHRINE 0.3 MG/.3ML
0.3 INJECTION SUBCUTANEOUS ONCE AS NEEDED
Status: CANCELLED | OUTPATIENT
Start: 2020-08-24

## 2020-08-24 RX ORDER — HEPARIN 100 UNIT/ML
500 SYRINGE INTRAVENOUS
Status: CANCELLED | OUTPATIENT
Start: 2020-08-24

## 2020-08-24 RX ORDER — METHYLPREDNISOLONE SOD SUCC 125 MG
125 VIAL (EA) INJECTION ONCE AS NEEDED
Status: CANCELLED | OUTPATIENT
Start: 2020-08-24

## 2020-08-24 RX ORDER — SODIUM CHLORIDE 0.9 % (FLUSH) 0.9 %
10 SYRINGE (ML) INJECTION
Status: CANCELLED | OUTPATIENT
Start: 2020-08-24

## 2020-08-24 RX ORDER — EPINEPHRINE 0.3 MG/.3ML
0.3 INJECTION SUBCUTANEOUS ONCE AS NEEDED
Status: DISCONTINUED | OUTPATIENT
Start: 2020-08-24 | End: 2020-08-24 | Stop reason: HOSPADM

## 2020-08-24 RX ORDER — SODIUM CHLORIDE 0.9 % (FLUSH) 0.9 %
10 SYRINGE (ML) INJECTION
Status: DISCONTINUED | OUTPATIENT
Start: 2020-08-24 | End: 2020-08-24 | Stop reason: HOSPADM

## 2020-08-24 RX ORDER — DIPHENHYDRAMINE HYDROCHLORIDE 50 MG/ML
50 INJECTION INTRAMUSCULAR; INTRAVENOUS ONCE AS NEEDED
Status: DISCONTINUED | OUTPATIENT
Start: 2020-08-24 | End: 2020-08-24 | Stop reason: HOSPADM

## 2020-08-24 RX ORDER — METHYLPREDNISOLONE SOD SUCC 125 MG
125 VIAL (EA) INJECTION ONCE AS NEEDED
Status: DISCONTINUED | OUTPATIENT
Start: 2020-08-24 | End: 2020-08-24 | Stop reason: HOSPADM

## 2020-08-24 RX ORDER — DIPHENHYDRAMINE HYDROCHLORIDE 50 MG/ML
50 INJECTION INTRAMUSCULAR; INTRAVENOUS ONCE AS NEEDED
Status: CANCELLED | OUTPATIENT
Start: 2020-08-24

## 2020-08-24 RX ADMIN — SODIUM CHLORIDE: 0.9 INJECTION, SOLUTION INTRAVENOUS at 10:08

## 2020-08-24 RX ADMIN — HYDROXYPROGESTERONE CAPROATE 250 MG: 1250 INJECTION INTRAMUSCULAR at 09:08

## 2020-08-24 RX ADMIN — SODIUM CHLORIDE 300 MG: 9 INJECTION, SOLUTION INTRAVENOUS at 10:08

## 2020-08-24 NOTE — PLAN OF CARE
Venofer administered, no reaction. Patient tolerated well. 24 gauge IV removed, catheter intact. No apparent distress noted. Discharge instructions given to patient. Patient understands instructions.

## 2020-08-31 ENCOUNTER — ROUTINE PRENATAL (OUTPATIENT)
Dept: OBSTETRICS AND GYNECOLOGY | Facility: CLINIC | Age: 25
End: 2020-08-31
Payer: MEDICAID

## 2020-08-31 VITALS
SYSTOLIC BLOOD PRESSURE: 125 MMHG | HEART RATE: 70 BPM | BODY MASS INDEX: 32.51 KG/M2 | DIASTOLIC BLOOD PRESSURE: 60 MMHG | WEIGHT: 189.38 LBS

## 2020-08-31 DIAGNOSIS — Z3A.30 30 WEEKS GESTATION OF PREGNANCY: ICD-10-CM

## 2020-08-31 DIAGNOSIS — Z34.83 ENCOUNTER FOR SUPERVISION OF OTHER NORMAL PREGNANCY, THIRD TRIMESTER: Primary | ICD-10-CM

## 2020-08-31 DIAGNOSIS — Z23 NEED FOR TDAP VACCINATION: ICD-10-CM

## 2020-08-31 DIAGNOSIS — Z30.09 GENERAL COUNSELING AND ADVICE FOR CONTRACEPTIVE MANAGEMENT: ICD-10-CM

## 2020-08-31 DIAGNOSIS — O99.012 ANEMIA OF MOTHER IN PREGNANCY, ANTEPARTUM, SECOND TRIMESTER: ICD-10-CM

## 2020-08-31 PROCEDURE — 90471 IMMUNIZATION ADMIN: CPT | Mod: PBBFAC,PO

## 2020-08-31 PROCEDURE — 99999 PR PBB SHADOW E&M-EST. PATIENT-LVL III: CPT | Mod: PBBFAC,,, | Performed by: OBSTETRICS & GYNECOLOGY

## 2020-08-31 PROCEDURE — 99212 OFFICE O/P EST SF 10 MIN: CPT | Mod: S$PBB,TH,, | Performed by: OBSTETRICS & GYNECOLOGY

## 2020-08-31 PROCEDURE — 99212 PR OFFICE/OUTPT VISIT, EST, LEVL II, 10-19 MIN: ICD-10-PCS | Mod: S$PBB,TH,, | Performed by: OBSTETRICS & GYNECOLOGY

## 2020-08-31 PROCEDURE — 99999 PR PBB SHADOW E&M-EST. PATIENT-LVL III: ICD-10-PCS | Mod: PBBFAC,,, | Performed by: OBSTETRICS & GYNECOLOGY

## 2020-08-31 PROCEDURE — 99213 OFFICE O/P EST LOW 20 MIN: CPT | Mod: PBBFAC,TH,PO,25 | Performed by: OBSTETRICS & GYNECOLOGY

## 2020-08-31 RX ADMIN — HYDROXYPROGESTERONE CAPROATE 250 MG: 1250 INJECTION INTRAMUSCULAR at 09:08

## 2020-08-31 NOTE — PROGRESS NOTES
30w1d  No complaints. Denies vaginal bleeding, abnormal discharge or contractions. Good fetal movement and daily kick counts met.  Polina and Tdap done today.  Completed last iron infusion last week (4 doses received). Will repeat CBC next week when comes for next Polina dose.  RTC weekly for [place] and 2 week for OB visit.

## 2020-09-08 ENCOUNTER — TELEPHONE (OUTPATIENT)
Dept: PHARMACY | Facility: CLINIC | Age: 25
End: 2020-09-08

## 2020-09-08 ENCOUNTER — ROUTINE PRENATAL (OUTPATIENT)
Dept: OBSTETRICS AND GYNECOLOGY | Facility: CLINIC | Age: 25
End: 2020-09-08
Payer: MEDICAID

## 2020-09-08 ENCOUNTER — CLINICAL SUPPORT (OUTPATIENT)
Dept: UROLOGY | Facility: CLINIC | Age: 25
End: 2020-09-08
Payer: MEDICAID

## 2020-09-08 DIAGNOSIS — Z87.51 HISTORY OF PRETERM LABOR: Primary | ICD-10-CM

## 2020-09-08 DIAGNOSIS — Z34.82 ENCOUNTER FOR SUPERVISION OF OTHER NORMAL PREGNANCY, SECOND TRIMESTER: ICD-10-CM

## 2020-09-08 DIAGNOSIS — N90.89 VULVAR IRRITATION: ICD-10-CM

## 2020-09-08 DIAGNOSIS — Z34.83 ENCOUNTER FOR SUPERVISION OF OTHER NORMAL PREGNANCY, THIRD TRIMESTER: Primary | ICD-10-CM

## 2020-09-08 DIAGNOSIS — N89.8 VAGINAL IRRITATION: ICD-10-CM

## 2020-09-08 PROCEDURE — 99999 PR PBB SHADOW E&M-EST. PATIENT-LVL I: CPT | Mod: PBBFAC,,, | Performed by: OBSTETRICS & GYNECOLOGY

## 2020-09-08 PROCEDURE — 99999 PR PBB SHADOW E&M-EST. PATIENT-LVL I: ICD-10-PCS | Mod: PBBFAC,,, | Performed by: OBSTETRICS & GYNECOLOGY

## 2020-09-08 PROCEDURE — 99212 PR OFFICE/OUTPT VISIT, EST, LEVL II, 10-19 MIN: ICD-10-PCS | Mod: S$PBB,TH,, | Performed by: OBSTETRICS & GYNECOLOGY

## 2020-09-08 PROCEDURE — 96372 THER/PROPH/DIAG INJ SC/IM: CPT | Mod: PBBFAC,PO

## 2020-09-08 PROCEDURE — 99212 OFFICE O/P EST SF 10 MIN: CPT | Mod: S$PBB,TH,, | Performed by: OBSTETRICS & GYNECOLOGY

## 2020-09-08 PROCEDURE — 99211 OFF/OP EST MAY X REQ PHY/QHP: CPT | Mod: PBBFAC,TH,PO,25 | Performed by: OBSTETRICS & GYNECOLOGY

## 2020-09-08 RX ADMIN — HYDROXYPROGESTERONE CAPROATE 250 MG: 1250 INJECTION INTRAMUSCULAR at 09:09

## 2020-09-08 NOTE — PROGRESS NOTES
31w2d  Patient came for [place] shot today and complained of vulvar irritation.   Denies vaginal bleeding, abnormal discharge or pelvic pain.  Felt irritation for past two days. She has a history of HSV and reports not feeling like that. She did take a bubble bath over the weekend which always causes irritation. Denies vaginal discharge.  On exam, there is redness but no ulcerations. Minimal white discharge on vulva. Affirm obtained due to history of recurrent vaginitis.   Reminded to avoid bubble baths and products with scents. Patient counseled to notify if symptoms worsen prior to results returning in next few days.   Keep follow up in 1 week as planned with repeat CBC.

## 2020-09-10 ENCOUNTER — PATIENT MESSAGE (OUTPATIENT)
Dept: OBSTETRICS AND GYNECOLOGY | Facility: CLINIC | Age: 25
End: 2020-09-10

## 2020-09-10 ENCOUNTER — HOSPITAL ENCOUNTER (EMERGENCY)
Facility: OTHER | Age: 25
Discharge: HOME | End: 2020-09-10
Attending: OBSTETRICS & GYNECOLOGY
Payer: MEDICAID

## 2020-09-10 VITALS
RESPIRATION RATE: 16 BRPM | WEIGHT: 189 LBS | SYSTOLIC BLOOD PRESSURE: 119 MMHG | DIASTOLIC BLOOD PRESSURE: 68 MMHG | OXYGEN SATURATION: 100 % | HEIGHT: 64 IN | TEMPERATURE: 98 F | HEART RATE: 78 BPM | BODY MASS INDEX: 32.27 KG/M2

## 2020-09-10 DIAGNOSIS — R10.2 PELVIC PRESSURE IN PREGNANCY: Primary | ICD-10-CM

## 2020-09-10 DIAGNOSIS — Z3A.31 31 WEEKS GESTATION OF PREGNANCY: ICD-10-CM

## 2020-09-10 DIAGNOSIS — O26.899 PELVIC PRESSURE IN PREGNANCY: Primary | ICD-10-CM

## 2020-09-10 DIAGNOSIS — N93.9 VAGINAL BLEEDING: ICD-10-CM

## 2020-09-10 LAB
ABO + RH BLD: NORMAL
BACTERIA #/AREA URNS HPF: ABNORMAL /HPF
BASOPHILS # BLD AUTO: 0.01 K/UL (ref 0–0.2)
BASOPHILS NFR BLD: 0.2 % (ref 0–1.9)
BILIRUB UR QL STRIP: NEGATIVE
BLD GP AB SCN CELLS X3 SERPL QL: NORMAL
BLOOD GROUP ANTIBODIES SERPL: NORMAL
CLARITY UR: CLEAR
COLOR UR: YELLOW
DIFFERENTIAL METHOD: ABNORMAL
EOSINOPHIL # BLD AUTO: 0.1 K/UL (ref 0–0.5)
EOSINOPHIL NFR BLD: 0.8 % (ref 0–8)
ERYTHROCYTE [DISTWIDTH] IN BLOOD BY AUTOMATED COUNT: 22.7 % (ref 11.5–14.5)
GLUCOSE UR QL STRIP: NEGATIVE
HCT VFR BLD AUTO: 31.3 % (ref 37–48.5)
HGB BLD-MCNC: 9.7 G/DL (ref 12–16)
HGB UR QL STRIP: NEGATIVE
HIV1+2 IGG SERPL QL IA.RAPID: NORMAL
HYALINE CASTS #/AREA URNS LPF: 0 /LPF
IMM GRANULOCYTES # BLD AUTO: 0.03 K/UL (ref 0–0.04)
IMM GRANULOCYTES NFR BLD AUTO: 0.5 % (ref 0–0.5)
KETONES UR QL STRIP: NEGATIVE
LEUKOCYTE ESTERASE UR QL STRIP: ABNORMAL
LYMPHOCYTES # BLD AUTO: 1.7 K/UL (ref 1–4.8)
LYMPHOCYTES NFR BLD: 27.1 % (ref 18–48)
MCH RBC QN AUTO: 25.5 PG (ref 27–31)
MCHC RBC AUTO-ENTMCNC: 31 G/DL (ref 32–36)
MCV RBC AUTO: 82 FL (ref 82–98)
MICROSCOPIC COMMENT: ABNORMAL
MONOCYTES # BLD AUTO: 0.5 K/UL (ref 0.3–1)
MONOCYTES NFR BLD: 7.3 % (ref 4–15)
NEUTROPHILS # BLD AUTO: 4.1 K/UL (ref 1.8–7.7)
NEUTROPHILS NFR BLD: 64.1 % (ref 38–73)
NITRITE UR QL STRIP: NEGATIVE
NRBC BLD-RTO: 0 /100 WBC
PH UR STRIP: 8 [PH] (ref 5–8)
PLATELET # BLD AUTO: 151 K/UL (ref 150–350)
PMV BLD AUTO: 11.6 FL (ref 9.2–12.9)
PROT UR QL STRIP: NEGATIVE
RBC # BLD AUTO: 3.81 M/UL (ref 4–5.4)
RBC #/AREA URNS HPF: 1 /HPF (ref 0–4)
SP GR UR STRIP: 1.02 (ref 1–1.03)
SQUAMOUS #/AREA URNS HPF: 4 /HPF
URN SPEC COLLECT METH UR: ABNORMAL
UROBILINOGEN UR STRIP-ACNC: NEGATIVE EU/DL
WBC # BLD AUTO: 6.43 K/UL (ref 3.9–12.7)
WBC #/AREA URNS HPF: 6 /HPF (ref 0–5)

## 2020-09-10 PROCEDURE — 86870 RBC ANTIBODY IDENTIFICATION: CPT

## 2020-09-10 PROCEDURE — 86703 HIV-1/HIV-2 1 RESULT ANTBDY: CPT

## 2020-09-10 PROCEDURE — 59025 FETAL NON-STRESS TEST: CPT

## 2020-09-10 PROCEDURE — 81000 URINALYSIS NONAUTO W/SCOPE: CPT

## 2020-09-10 PROCEDURE — 63600175 PHARM REV CODE 636 W HCPCS: Performed by: STUDENT IN AN ORGANIZED HEALTH CARE EDUCATION/TRAINING PROGRAM

## 2020-09-10 PROCEDURE — 85025 COMPLETE CBC W/AUTO DIFF WBC: CPT

## 2020-09-10 PROCEDURE — 86703 HIV-1/HIV-2 1 RESULT ANTBDY: CPT | Mod: 91

## 2020-09-10 PROCEDURE — 87491 CHLMYD TRACH DNA AMP PROBE: CPT

## 2020-09-10 PROCEDURE — 86592 SYPHILIS TEST NON-TREP QUAL: CPT

## 2020-09-10 PROCEDURE — 86850 RBC ANTIBODY SCREEN: CPT

## 2020-09-10 PROCEDURE — 99284 EMERGENCY DEPT VISIT MOD MDM: CPT | Mod: 25

## 2020-09-10 RX ADMIN — SODIUM CHLORIDE, SODIUM LACTATE, POTASSIUM CHLORIDE, AND CALCIUM CHLORIDE 1000 ML: .6; .31; .03; .02 INJECTION, SOLUTION INTRAVENOUS at 12:09

## 2020-09-10 NOTE — ED PROVIDER NOTES
Encounter Date: 9/10/2020       History     Chief Complaint   Patient presents with    Vaginal Bleeding    PELVIC PRESSURE       Yamel Toribio is a 25 y.o. [id number] at 31w4d presents complaining of vaginal spotting and increased pelvic pressure since early this morning after having intercourse. She states that the spotting has now stopped, but the intermittent pelvic pressure is still present.   This IUP is complicated by h/o LTCS x1 (desires ), second trimester loss (on asa and song), GBS UTI, HSV .     She has an anterior placenta with no previa.     She was seen by her primary OBGYN on  with complaints of vulvar itching. She had recently taken a bubble bath which has caused itching like this in the past. Denies any vulvar lesions and none seen on exam that day. Vaginosis screen performed but not yet resulted.      Patient denies contractions, reports vaginal bleeding (spotting), denies LOF.   Fetal Movement: normal    Review of patient's allergies indicates:  No Known Allergies  Past Medical History:   Diagnosis Date    Anemia     Iron deficiency    Asthma      Past Surgical History:   Procedure Laterality Date     SECTION  2018    [facility] (FTP & NRFHT's)     Family History   Problem Relation Age of Onset    No Known Problems Father     No Known Problems Mother     No Known Problems Brother      Social History     Tobacco Use    Smoking status: Former Smoker    Smokeless tobacco: Never Used   Substance Use Topics    Alcohol use: No    Drug use: No     Review of Systems   Constitutional: Negative for fever.   HENT: Negative for sinus pain and sore throat.    Eyes: Negative for photophobia and visual disturbance.   Respiratory: Negative for chest tightness and shortness of breath.    Cardiovascular: Negative for chest pain and palpitations.   Gastrointestinal: Negative for abdominal distention, abdominal pain, nausea and vomiting.   Genitourinary: Positive for vaginal bleeding.  Negative for dysuria.   Musculoskeletal: Negative for back pain.   Skin: Negative for rash.   Neurological: Negative for weakness and headaches.   Hematological: Does not bruise/bleed easily.       Physical Exam     Initial Vitals [09/10/20 1131]   BP Pulse Resp Temp SpO2   119/68 75 16 98.1 °F (36.7 °C) 100 %      MAP       --         Physical Exam    Constitutional: She appears well-developed and well-nourished. She is not diaphoretic. No distress.   HENT:   Head: Normocephalic and atraumatic.   Eyes: EOM are normal.   Neck: Normal range of motion.   Cardiovascular: Normal rate.   Pulmonary/Chest: No respiratory distress.   Abdominal: Soft. Bowel sounds are normal. She exhibits no distension and no mass. There is no abdominal tenderness. There is no rebound and no guarding.   Musculoskeletal: Normal range of motion. No tenderness or edema.   Neurological: She is alert and oriented to person, place, and time.   Skin: Skin is warm and dry. No rash noted.   Psychiatric: She has a normal mood and affect.         ED Course   Obtain Fetal nonstress test (NST)    Date/Time: 9/10/2020 12:07 PM  Performed by: Carli Khanna MD  Authorized by: Carli Khanna MD     Nonstress Test:     Variability:  6-25 BPM    Decelerations:  Variable (rare)    Baseline:  145    Uterine Irritability: Yes      Contractions:  Not present  Biophysical Profile:     Nonstress Test Interpretation: reactive      Overall Impression:  Reassuring      Labs Reviewed   URINALYSIS, REFLEX TO URINE CULTURE - Abnormal; Notable for the following components:       Result Value    Leukocytes, UA 1+ (*)     All other components within normal limits    Narrative:     Specimen Source->Urine   CBC W/ AUTO DIFFERENTIAL - Abnormal; Notable for the following components:    RBC 3.81 (*)     Hemoglobin 9.7 (*)     Hematocrit 31.3 (*)     Mean Corpuscular Hemoglobin 25.5 (*)     Mean Corpuscular Hemoglobin Conc 31.0 (*)     RDW 22.7 (*)     All other components  within normal limits   URINALYSIS MICROSCOPIC - Abnormal; Notable for the following components:    WBC, UA 6 (*)     All other components within normal limits    Narrative:     Specimen Source->Urine   C. TRACHOMATIS/N. GONORRHOEAE BY AMP DNA   HIV 1 / 2 ANTIBODY   RAPID HIV   RPR   SARS-COV-2 RNA AMPLIFICATION, QUAL   TYPE & SCREEN          Imaging Results    None          Medical Decision Making:   ED Management:  VSS  NST reactive and reassuring, rare variable  Spec exam with no bleeding noted, minimal physiologic appearing discharge  GCCT collected  SVE 3/60/-3, felt to be posterior and multiparous, will do 2 hr recheck-- 2 hr recheck unchanged  1L LR bolus   3rd trimester labs collected  MVP 4.7  Discharged home with PTL precautions              Attending Attestation:   Physician Attestation Statement for Resident:  As the supervising MD   Physician Attestation Statement: I have personally seen and examined this patient.   I agree with the above history. -:   As the supervising MD I agree with the above PE.    As the supervising MD I agree with the above treatment, course, plan, and disposition.  I was personally present during the critical portions of the procedure(s) performed by the resident and was immediately available in the ED to provide services and assistance as needed during the entire procedure.  I have reviewed and agree with the residents interpretation of the following: rhythm strips.  I have reviewed the following: old records at this facility.                              Clinical Impression:       ICD-10-CM ICD-9-CM   1. Pelvic pressure in pregnancy  O26.899 646.83    R10.2 625.9   2. Vaginal bleeding  N93.9 623.8   3. 31 weeks gestation of pregnancy  Z3A.31 V22.2             ED Disposition Condition    Discharge Stable        ED Prescriptions     None        Follow-up Information    None                                      Carli Khanna MD  Resident  09/10/20 [id number]       Malena WORRELL  MD [place]  09/14/20 [id number]

## 2020-09-10 NOTE — DISCHARGE INSTRUCTIONS
The following signs may be a warning that you may need medical care.  Severe headache not relieved by tylenol.  Blurry vision or seeing spots.  Sudden swelling in your face or hands.  Severe stomach pains or cramps.  Vomiting lasting more than 24 hours.  Fever greater than 100.4 degrees.  Vaginal bleeding that is more than just spotting.  A gush or flow of watery fluid from your vagina.  Decrease or absence of baby's movement (starting at 28 weeks).   (less than 37 weeks) labor: more than 6 contractions in an hour for 2 hours.  Term (greater than 37 weeks) labor: contractions every 5 minutes for 2 hours.    [facility] [phone]

## 2020-09-11 ENCOUNTER — TELEPHONE (OUTPATIENT)
Dept: OBSTETRICS AND GYNECOLOGY | Facility: CLINIC | Age: 25
End: 2020-09-11

## 2020-09-11 ENCOUNTER — PATIENT MESSAGE (OUTPATIENT)
Dept: OBSTETRICS AND GYNECOLOGY | Facility: CLINIC | Age: 25
End: 2020-09-11

## 2020-09-11 LAB — HIV 1+2 AB+HIV1 P24 AG SERPL QL IA: NEGATIVE

## 2020-09-11 NOTE — TELEPHONE ENCOUNTER
Called patient to discuss ED visit and how she is feeling. She reports no further contractions. Vaginal irritation that she had earlier in the week has not worsened and not currently bothersome. Vaginosis swab still pending and GC/CT done yesterday in [facility]. No steroids given per patient. ED precautions reviewed. Counseled for bedrest this weekend and increased hydration. OB follow up and [place] planned on Monday. Will likely do an NST if any contractions and repeat cervical exam. Will notify MFM as may need an additional follow up visit. Patient agreed to plan.

## 2020-09-12 LAB — RPR SER QL: NORMAL

## 2020-09-14 ENCOUNTER — CLINICAL SUPPORT (OUTPATIENT)
Dept: OBSTETRICS AND GYNECOLOGY | Facility: CLINIC | Age: 25
End: 2020-09-14
Payer: MEDICAID

## 2020-09-14 ENCOUNTER — ROUTINE PRENATAL (OUTPATIENT)
Dept: OBSTETRICS AND GYNECOLOGY | Facility: CLINIC | Age: 25
End: 2020-09-14
Attending: STUDENT IN AN ORGANIZED HEALTH CARE EDUCATION/TRAINING PROGRAM
Payer: MEDICAID

## 2020-09-14 VITALS
BODY MASS INDEX: 31.77 KG/M2 | SYSTOLIC BLOOD PRESSURE: 118 MMHG | DIASTOLIC BLOOD PRESSURE: 70 MMHG | WEIGHT: 185.06 LBS

## 2020-09-14 DIAGNOSIS — Z87.51 HISTORY OF PRETERM DELIVERY: Primary | ICD-10-CM

## 2020-09-14 DIAGNOSIS — Z3A.32 32 WEEKS GESTATION OF PREGNANCY: Primary | ICD-10-CM

## 2020-09-14 PROCEDURE — 99999 PR PBB SHADOW E&M-EST. PATIENT-LVL I: ICD-10-PCS | Mod: PBBFAC,,,

## 2020-09-14 PROCEDURE — 99999 PR PBB SHADOW E&M-EST. PATIENT-LVL II: CPT | Mod: PBBFAC,,, | Performed by: STUDENT IN AN ORGANIZED HEALTH CARE EDUCATION/TRAINING PROGRAM

## 2020-09-14 PROCEDURE — 99999 PR PBB SHADOW E&M-EST. PATIENT-LVL II: ICD-10-PCS | Mod: PBBFAC,,, | Performed by: STUDENT IN AN ORGANIZED HEALTH CARE EDUCATION/TRAINING PROGRAM

## 2020-09-14 PROCEDURE — 99212 PR OFFICE/OUTPT VISIT, EST, LEVL II, 10-19 MIN: ICD-10-PCS | Mod: TH,,, | Performed by: STUDENT IN AN ORGANIZED HEALTH CARE EDUCATION/TRAINING PROGRAM

## 2020-09-14 PROCEDURE — 96372 THER/PROPH/DIAG INJ SC/IM: CPT | Mod: PBBFAC,PN

## 2020-09-14 PROCEDURE — 99212 OFFICE O/P EST SF 10 MIN: CPT | Mod: PBBFAC,PN,25 | Performed by: STUDENT IN AN ORGANIZED HEALTH CARE EDUCATION/TRAINING PROGRAM

## 2020-09-14 PROCEDURE — 99999 PR PBB SHADOW E&M-EST. PATIENT-LVL I: CPT | Mod: PBBFAC,,,

## 2020-09-14 PROCEDURE — 99212 OFFICE O/P EST SF 10 MIN: CPT | Mod: TH,,, | Performed by: STUDENT IN AN ORGANIZED HEALTH CARE EDUCATION/TRAINING PROGRAM

## 2020-09-14 RX ORDER — BETAMETHASONE SODIUM PHOSPHATE AND BETAMETHASONE ACETATE 3; 3 MG/ML; MG/ML
12 INJECTION, SUSPENSION INTRA-ARTICULAR; INTRALESIONAL; INTRAMUSCULAR; SOFT TISSUE DAILY
Status: COMPLETED | OUTPATIENT
Start: 2020-09-14 | End: 2020-09-15

## 2020-09-14 RX ADMIN — HYDROXYPROGESTERONE CAPROATE 250 MG: 1250 INJECTION INTRAMUSCULAR at 11:09

## 2020-09-14 RX ADMIN — BETAMETHASONE ACETATE AND BETAMETHASONE SODIUM PHOSPHATE 12 MG: 3; 3 INJECTION, SUSPENSION INTRA-ARTICULAR; INTRALESIONAL; INTRAMUSCULAR; SOFT TISSUE at 11:09

## 2020-09-14 NOTE — PROGRESS NOTES
Doing well.  No complaints.  Reports fetal movement.  Denies contractions, leakage of fluid, vaginal bleeding.  SVE 3/T/H.  Will give BMZ today with [place].  All questions answered.  ED precautions reviewed.  RTC tomorrow for repeat BMZ or sooner if needed.

## 2020-09-14 NOTE — PROGRESS NOTES
Ordering Provider: Dr. Charles and Dr. Araya    During visit today patient received an injection of Brethren to right glut and celestone to LEFT glut.  Tolerated well.  Instructed pt to remain 15 minutes after injection to monitor for reactions.      Pre pain scale: none    Post pain scale: none

## 2020-09-15 ENCOUNTER — CLINICAL SUPPORT (OUTPATIENT)
Dept: OBSTETRICS AND GYNECOLOGY | Facility: CLINIC | Age: 25
End: 2020-09-15
Payer: MEDICAID

## 2020-09-15 ENCOUNTER — TELEPHONE (OUTPATIENT)
Dept: OBSTETRICS AND GYNECOLOGY | Facility: CLINIC | Age: 25
End: 2020-09-15

## 2020-09-15 DIAGNOSIS — Z87.51 HISTORY OF PRETERM DELIVERY: Primary | ICD-10-CM

## 2020-09-15 PROCEDURE — 96372 THER/PROPH/DIAG INJ SC/IM: CPT | Mod: PBBFAC

## 2020-09-15 RX ADMIN — BETAMETHASONE ACETATE AND BETAMETHASONE SODIUM PHOSPHATE 12 MG: 3; 3 INJECTION, SUSPENSION INTRA-ARTICULAR; INTRALESIONAL; INTRAMUSCULAR; SOFT TISSUE at 11:09

## 2020-09-15 NOTE — TELEPHONE ENCOUNTER
----- Message from Malu Araya MD sent at 9/15/2020  8:17 AM CDT -----  Can we just make sure she can still come in otday ofr her 2nd betamethasone shot?  Thank you!!

## 2020-09-15 NOTE — PROGRESS NOTES
Patient presents to clinic with orders from Dr Araya for second dose of 12 mg betamethasone, the first dose was provided yesterday.    Ordering Physician: Dr Araya  Order Type: Verbal   Pre-Pain Scale:None   Medication explained to pt, pt verbalized understanding.   Injection provided IM to left glut. . Patient tolerated well, no allergic reaction noted. Requested patient to remain 10 minutes after injection.  Post Pain Scale:None

## 2020-09-18 ENCOUNTER — PATIENT MESSAGE (OUTPATIENT)
Dept: OBSTETRICS AND GYNECOLOGY | Facility: CLINIC | Age: 25
End: 2020-09-18

## 2020-09-18 DIAGNOSIS — B96.89 BACTERIAL VAGINITIS: Primary | ICD-10-CM

## 2020-09-18 DIAGNOSIS — N76.0 BACTERIAL VAGINITIS: Primary | ICD-10-CM

## 2020-09-18 RX ORDER — METRONIDAZOLE 500 MG/1
500 TABLET ORAL EVERY 12 HOURS
Qty: 14 TABLET | Refills: 0 | Status: SHIPPED | OUTPATIENT
Start: 2020-09-18 | End: 2020-09-25

## 2020-09-21 ENCOUNTER — CLINICAL SUPPORT (OUTPATIENT)
Dept: OBSTETRICS AND GYNECOLOGY | Facility: CLINIC | Age: 25
End: 2020-09-21
Payer: MEDICAID

## 2020-09-21 ENCOUNTER — ROUTINE PRENATAL (OUTPATIENT)
Dept: OBSTETRICS AND GYNECOLOGY | Facility: CLINIC | Age: 25
End: 2020-09-21
Attending: STUDENT IN AN ORGANIZED HEALTH CARE EDUCATION/TRAINING PROGRAM
Payer: MEDICAID

## 2020-09-21 VITALS — WEIGHT: 187.63 LBS | SYSTOLIC BLOOD PRESSURE: 122 MMHG | DIASTOLIC BLOOD PRESSURE: 78 MMHG | BODY MASS INDEX: 32.2 KG/M2

## 2020-09-21 DIAGNOSIS — Z3A.33 33 WEEKS GESTATION OF PREGNANCY: Primary | ICD-10-CM

## 2020-09-21 DIAGNOSIS — Z87.51 HISTORY OF PRETERM DELIVERY: Primary | ICD-10-CM

## 2020-09-21 PROCEDURE — 90471 IMMUNIZATION ADMIN: CPT | Mod: PBBFAC,PN

## 2020-09-21 PROCEDURE — 99999 PR PBB SHADOW E&M-EST. PATIENT-LVL III: CPT | Mod: PBBFAC,,, | Performed by: STUDENT IN AN ORGANIZED HEALTH CARE EDUCATION/TRAINING PROGRAM

## 2020-09-21 PROCEDURE — 99212 PR OFFICE/OUTPT VISIT, EST, LEVL II, 10-19 MIN: ICD-10-PCS | Mod: TH,,, | Performed by: STUDENT IN AN ORGANIZED HEALTH CARE EDUCATION/TRAINING PROGRAM

## 2020-09-21 PROCEDURE — 99999 PR PBB SHADOW E&M-EST. PATIENT-LVL II: CPT | Mod: PBBFAC,,,

## 2020-09-21 PROCEDURE — 99999 PR PBB SHADOW E&M-EST. PATIENT-LVL III: ICD-10-PCS | Mod: PBBFAC,,, | Performed by: STUDENT IN AN ORGANIZED HEALTH CARE EDUCATION/TRAINING PROGRAM

## 2020-09-21 PROCEDURE — 99999 PR PBB SHADOW E&M-EST. PATIENT-LVL II: ICD-10-PCS | Mod: PBBFAC,,,

## 2020-09-21 PROCEDURE — 99212 OFFICE O/P EST SF 10 MIN: CPT | Mod: TH,,, | Performed by: STUDENT IN AN ORGANIZED HEALTH CARE EDUCATION/TRAINING PROGRAM

## 2020-09-21 PROCEDURE — 96372 THER/PROPH/DIAG INJ SC/IM: CPT | Mod: PBBFAC,PN

## 2020-09-21 PROCEDURE — 99213 OFFICE O/P EST LOW 20 MIN: CPT | Mod: PBBFAC,PN | Performed by: STUDENT IN AN ORGANIZED HEALTH CARE EDUCATION/TRAINING PROGRAM

## 2020-09-21 RX ADMIN — HYDROXYPROGESTERONE CAPROATE 250 MG: 1250 INJECTION INTRAMUSCULAR at 09:09

## 2020-09-21 NOTE — PROGRESS NOTES
Ordering Physician: Dr. Araya    Order Type: Written    During visit today patient received injection of Polina to left glut. Patient tolerated well, no allergic reaction noted. Requested patient to remain 10 minutes after injection.     Pre-Pain Scale:None     Post Pain Scale:None

## 2020-09-21 NOTE — PROGRESS NOTES
Doing well.  No complaints.  Reports fetal movement.  Denies contractions, leakage of fluid, vaginal bleeding.  Polina and flu today.  All questions answered.  RTC in 2 weeks or sooner if needed.

## 2020-09-28 ENCOUNTER — CLINICAL SUPPORT (OUTPATIENT)
Dept: OBSTETRICS AND GYNECOLOGY | Facility: CLINIC | Age: 25
End: 2020-09-28
Payer: MEDICAID

## 2020-09-28 DIAGNOSIS — Z87.51 HISTORY OF PRETERM DELIVERY: Primary | ICD-10-CM

## 2020-09-28 PROCEDURE — 99999 PR PBB SHADOW E&M-EST. PATIENT-LVL I: ICD-10-PCS | Mod: PBBFAC,,,

## 2020-09-28 PROCEDURE — 96372 THER/PROPH/DIAG INJ SC/IM: CPT | Mod: PBBFAC,PN

## 2020-09-28 PROCEDURE — 99999 PR PBB SHADOW E&M-EST. PATIENT-LVL I: CPT | Mod: PBBFAC,,,

## 2020-09-28 RX ADMIN — HYDROXYPROGESTERONE CAPROATE 250 MG: 1250 INJECTION INTRAMUSCULAR at 11:09

## 2020-09-28 NOTE — PROGRESS NOTES
Ordering Physician: Dr. Araya     Order Type: Written    During visit today patient received injection of song to right glut. Patient tolerated well, no allergic reaction noted. Requested patient to remain 10 minutes after injection.     Pre-Pain Scale:None     Post Pain Scale:None

## 2020-10-01 ENCOUNTER — PATIENT MESSAGE (OUTPATIENT)
Dept: OBSTETRICS AND GYNECOLOGY | Facility: CLINIC | Age: 25
End: 2020-10-01

## 2020-10-02 ENCOUNTER — PATIENT MESSAGE (OUTPATIENT)
Dept: ADMINISTRATIVE | Facility: OTHER | Age: 25
End: 2020-10-02

## 2020-10-05 ENCOUNTER — ROUTINE PRENATAL (OUTPATIENT)
Dept: OBSTETRICS AND GYNECOLOGY | Facility: CLINIC | Age: 25
End: 2020-10-05
Attending: STUDENT IN AN ORGANIZED HEALTH CARE EDUCATION/TRAINING PROGRAM
Payer: MEDICAID

## 2020-10-05 ENCOUNTER — LAB VISIT (OUTPATIENT)
Dept: LAB | Facility: HOSPITAL | Age: 25
End: 2020-10-05
Attending: STUDENT IN AN ORGANIZED HEALTH CARE EDUCATION/TRAINING PROGRAM
Payer: MEDICAID

## 2020-10-05 ENCOUNTER — CLINICAL SUPPORT (OUTPATIENT)
Dept: OBSTETRICS AND GYNECOLOGY | Facility: CLINIC | Age: 25
End: 2020-10-05
Payer: MEDICAID

## 2020-10-05 VITALS
BODY MASS INDEX: 33.07 KG/M2 | DIASTOLIC BLOOD PRESSURE: 72 MMHG | SYSTOLIC BLOOD PRESSURE: 110 MMHG | WEIGHT: 192.69 LBS

## 2020-10-05 DIAGNOSIS — Z3A.35 35 WEEKS GESTATION OF PREGNANCY: Primary | ICD-10-CM

## 2020-10-05 DIAGNOSIS — Z3A.35 35 WEEKS GESTATION OF PREGNANCY: ICD-10-CM

## 2020-10-05 LAB
BASOPHILS # BLD AUTO: 0.01 K/UL (ref 0–0.2)
BASOPHILS NFR BLD: 0.1 % (ref 0–1.9)
DIFFERENTIAL METHOD: ABNORMAL
EOSINOPHIL # BLD AUTO: 0.1 K/UL (ref 0–0.5)
EOSINOPHIL NFR BLD: 0.8 % (ref 0–8)
ERYTHROCYTE [DISTWIDTH] IN BLOOD BY AUTOMATED COUNT: 20.6 % (ref 11.5–14.5)
HCT VFR BLD AUTO: 34 % (ref 37–48.5)
HGB BLD-MCNC: 10.3 G/DL (ref 12–16)
IMM GRANULOCYTES # BLD AUTO: 0.04 K/UL (ref 0–0.04)
IMM GRANULOCYTES NFR BLD AUTO: 0.5 % (ref 0–0.5)
LYMPHOCYTES # BLD AUTO: 1.9 K/UL (ref 1–4.8)
LYMPHOCYTES NFR BLD: 25.5 % (ref 18–48)
MCH RBC QN AUTO: 26.1 PG (ref 27–31)
MCHC RBC AUTO-ENTMCNC: 30.3 G/DL (ref 32–36)
MCV RBC AUTO: 86 FL (ref 82–98)
MONOCYTES # BLD AUTO: 0.6 K/UL (ref 0.3–1)
MONOCYTES NFR BLD: 8.1 % (ref 4–15)
NEUTROPHILS # BLD AUTO: 4.9 K/UL (ref 1.8–7.7)
NEUTROPHILS NFR BLD: 65 % (ref 38–73)
NRBC BLD-RTO: 0 /100 WBC
PLATELET # BLD AUTO: 143 K/UL (ref 150–350)
PMV BLD AUTO: 12.1 FL (ref 9.2–12.9)
RBC # BLD AUTO: 3.95 M/UL (ref 4–5.4)
WBC # BLD AUTO: 7.49 K/UL (ref 3.9–12.7)

## 2020-10-05 PROCEDURE — 86703 HIV-1/HIV-2 1 RESULT ANTBDY: CPT

## 2020-10-05 PROCEDURE — 96372 THER/PROPH/DIAG INJ SC/IM: CPT | Mod: PBBFAC,PN

## 2020-10-05 PROCEDURE — 99999 PR PBB SHADOW E&M-EST. PATIENT-LVL I: CPT | Mod: PBBFAC,,,

## 2020-10-05 PROCEDURE — 85025 COMPLETE CBC W/AUTO DIFF WBC: CPT

## 2020-10-05 PROCEDURE — 99999 PR PBB SHADOW E&M-EST. PATIENT-LVL III: ICD-10-PCS | Mod: PBBFAC,,, | Performed by: STUDENT IN AN ORGANIZED HEALTH CARE EDUCATION/TRAINING PROGRAM

## 2020-10-05 PROCEDURE — 99213 OFFICE O/P EST LOW 20 MIN: CPT | Mod: TH,S$PBB,, | Performed by: STUDENT IN AN ORGANIZED HEALTH CARE EDUCATION/TRAINING PROGRAM

## 2020-10-05 PROCEDURE — 99213 PR OFFICE/OUTPT VISIT, EST, LEVL III, 20-29 MIN: ICD-10-PCS | Mod: TH,S$PBB,, | Performed by: STUDENT IN AN ORGANIZED HEALTH CARE EDUCATION/TRAINING PROGRAM

## 2020-10-05 PROCEDURE — 99999 PR PBB SHADOW E&M-EST. PATIENT-LVL I: ICD-10-PCS | Mod: PBBFAC,,,

## 2020-10-05 PROCEDURE — 86592 SYPHILIS TEST NON-TREP QUAL: CPT

## 2020-10-05 PROCEDURE — 99213 OFFICE O/P EST LOW 20 MIN: CPT | Mod: PBBFAC,PN,25 | Performed by: STUDENT IN AN ORGANIZED HEALTH CARE EDUCATION/TRAINING PROGRAM

## 2020-10-05 PROCEDURE — 99999 PR PBB SHADOW E&M-EST. PATIENT-LVL III: CPT | Mod: PBBFAC,,, | Performed by: STUDENT IN AN ORGANIZED HEALTH CARE EDUCATION/TRAINING PROGRAM

## 2020-10-05 RX ORDER — TERCONAZOLE 4 MG/G
1 CREAM VAGINAL NIGHTLY
Qty: 1 TUBE | Refills: 0 | Status: SHIPPED | OUTPATIENT
Start: 2020-10-05 | End: 2020-10-12

## 2020-10-05 RX ORDER — NYSTATIN 100000 U/G
CREAM TOPICAL 2 TIMES DAILY
Qty: 30 G | Refills: 0 | Status: ON HOLD | OUTPATIENT
Start: 2020-10-05 | End: 2020-10-28 | Stop reason: HOSPADM

## 2020-10-05 RX ADMIN — HYDROXYPROGESTERONE CAPROATE 250 MG: 1250 INJECTION INTRAMUSCULAR at 10:10

## 2020-10-05 NOTE — PROGRESS NOTES
Doing well.  Does report some vaginal and vulvar itching following treatment for BV.  Reports fetal movement.  Denies contractions, leakage of fluid, vaginal bleeding.  Desires TOLAC - R/B/A reviewed and all questions answered.  Consents signed.  SSE with clumpy white discharge, SVE 3/T/H.  Will treat for yeast.  Labs today.  All questions answered.  OB Hx reviewed:  H/o CD x 1 (got to 8 cm then had CD secondary to NRFHTs).  H/o loss at 19 weeks that then delivered vaginally.  RTC in 1 week or sooner if needed.

## 2020-10-06 LAB
C TRACH DNA SPEC QL NAA+PROBE: NOT DETECTED
N GONORRHOEA DNA SPEC QL NAA+PROBE: NOT DETECTED
RPR SER QL: NORMAL

## 2020-10-06 RX ORDER — HYDROXYPROGESTERONE CAPROATE 250 MG/ML
250 INJECTION INTRAMUSCULAR
Qty: 20 ML | Refills: 0 | OUTPATIENT
Start: 2020-10-06 | End: 2021-02-17

## 2020-10-06 NOTE — TELEPHONE ENCOUNTER
----- Message from Adriana Charles MD sent at 10/5/2020  1:49 PM CDT -----  Regarding: Refill of Polina  I received an auto-refill for [place]. Can you see if she has enough for this week and next week because no longer needed after next week.  Joan

## 2020-10-07 LAB — HIV 1+2 AB+HIV1 P24 AG SERPL QL IA: NEGATIVE

## 2020-10-12 ENCOUNTER — ROUTINE PRENATAL (OUTPATIENT)
Dept: OBSTETRICS AND GYNECOLOGY | Facility: CLINIC | Age: 25
End: 2020-10-12
Attending: STUDENT IN AN ORGANIZED HEALTH CARE EDUCATION/TRAINING PROGRAM
Payer: MEDICAID

## 2020-10-12 ENCOUNTER — CLINICAL SUPPORT (OUTPATIENT)
Dept: OBSTETRICS AND GYNECOLOGY | Facility: CLINIC | Age: 25
End: 2020-10-12
Payer: MEDICAID

## 2020-10-12 VITALS — SYSTOLIC BLOOD PRESSURE: 122 MMHG | DIASTOLIC BLOOD PRESSURE: 72 MMHG | BODY MASS INDEX: 33.3 KG/M2 | WEIGHT: 194 LBS

## 2020-10-12 DIAGNOSIS — Z3A.36 36 WEEKS GESTATION OF PREGNANCY: Primary | ICD-10-CM

## 2020-10-12 DIAGNOSIS — Z87.51 HISTORY OF PRETERM DELIVERY: Primary | ICD-10-CM

## 2020-10-12 PROCEDURE — 99212 PR OFFICE/OUTPT VISIT, EST, LEVL II, 10-19 MIN: ICD-10-PCS | Mod: TH,,, | Performed by: STUDENT IN AN ORGANIZED HEALTH CARE EDUCATION/TRAINING PROGRAM

## 2020-10-12 PROCEDURE — 99999 PR PBB SHADOW E&M-EST. PATIENT-LVL I: CPT | Mod: PBBFAC,,,

## 2020-10-12 PROCEDURE — 87081 CULTURE SCREEN ONLY: CPT

## 2020-10-12 PROCEDURE — 96372 THER/PROPH/DIAG INJ SC/IM: CPT | Mod: PBBFAC,PN

## 2020-10-12 PROCEDURE — 99999 PR PBB SHADOW E&M-EST. PATIENT-LVL II: ICD-10-PCS | Mod: PBBFAC,,, | Performed by: STUDENT IN AN ORGANIZED HEALTH CARE EDUCATION/TRAINING PROGRAM

## 2020-10-12 PROCEDURE — 99212 OFFICE O/P EST SF 10 MIN: CPT | Mod: TH,,, | Performed by: STUDENT IN AN ORGANIZED HEALTH CARE EDUCATION/TRAINING PROGRAM

## 2020-10-12 PROCEDURE — 99999 PR PBB SHADOW E&M-EST. PATIENT-LVL I: ICD-10-PCS | Mod: PBBFAC,,,

## 2020-10-12 PROCEDURE — 99212 OFFICE O/P EST SF 10 MIN: CPT | Mod: PBBFAC,PN,25 | Performed by: STUDENT IN AN ORGANIZED HEALTH CARE EDUCATION/TRAINING PROGRAM

## 2020-10-12 PROCEDURE — 99999 PR PBB SHADOW E&M-EST. PATIENT-LVL II: CPT | Mod: PBBFAC,,, | Performed by: STUDENT IN AN ORGANIZED HEALTH CARE EDUCATION/TRAINING PROGRAM

## 2020-10-12 RX ADMIN — HYDROXYPROGESTERONE CAPROATE 250 MG: 1250 INJECTION INTRAMUSCULAR at 09:10

## 2020-10-12 NOTE — PROGRESS NOTES
Ordering Physician: Dr. Araya    Order Type: Written    During visit today patient received injection of Polina to right glut. Patient tolerated well, no allergic reaction noted. Requested patient to remain 10 minutes after injection.     Pre-Pain Scale:None     Post Pain Scale:None

## 2020-10-12 NOTE — PROGRESS NOTES
Doing well.  No complaints.  Reports fetal movement.  Denies contractions, leakage of fluid, vaginal bleeding.  SVE 3/T/H.  GBS today.  Last song.  All questions answered.  RTC in 1 week or sooner if needed.

## 2020-10-15 LAB — BACTERIA SPEC AEROBE CULT: NORMAL

## 2020-10-18 NOTE — PROGRESS NOTES
Doing well.  No complaints.  Reports fetal movement.  Denies contractions, leakage of fluid, vaginal bleeding.  SVE 3/T/H. Desires TOLAC - R/B/A reviewed and all questions answered.  RTC in 1 week or sooner if needed.

## 2020-10-19 ENCOUNTER — ROUTINE PRENATAL (OUTPATIENT)
Dept: OBSTETRICS AND GYNECOLOGY | Facility: CLINIC | Age: 25
End: 2020-10-19
Attending: STUDENT IN AN ORGANIZED HEALTH CARE EDUCATION/TRAINING PROGRAM
Payer: MEDICAID

## 2020-10-19 ENCOUNTER — TELEPHONE (OUTPATIENT)
Dept: OBSTETRICS AND GYNECOLOGY | Facility: CLINIC | Age: 25
End: 2020-10-19

## 2020-10-19 VITALS
SYSTOLIC BLOOD PRESSURE: 110 MMHG | WEIGHT: 197.19 LBS | BODY MASS INDEX: 33.85 KG/M2 | DIASTOLIC BLOOD PRESSURE: 76 MMHG

## 2020-10-19 DIAGNOSIS — Z3A.37 37 WEEKS GESTATION OF PREGNANCY: Primary | ICD-10-CM

## 2020-10-19 DIAGNOSIS — Z34.90 ENCOUNTER FOR ELECTIVE INDUCTION OF LABOR: Primary | ICD-10-CM

## 2020-10-19 DIAGNOSIS — Z01.812 PRE-PROCEDURE LAB EXAM: ICD-10-CM

## 2020-10-19 PROCEDURE — 99999 PR PBB SHADOW E&M-EST. PATIENT-LVL II: CPT | Mod: PBBFAC,,, | Performed by: STUDENT IN AN ORGANIZED HEALTH CARE EDUCATION/TRAINING PROGRAM

## 2020-10-19 PROCEDURE — 99212 OFFICE O/P EST SF 10 MIN: CPT | Mod: PBBFAC,PN | Performed by: STUDENT IN AN ORGANIZED HEALTH CARE EDUCATION/TRAINING PROGRAM

## 2020-10-19 PROCEDURE — 99999 PR PBB SHADOW E&M-EST. PATIENT-LVL II: ICD-10-PCS | Mod: PBBFAC,,, | Performed by: STUDENT IN AN ORGANIZED HEALTH CARE EDUCATION/TRAINING PROGRAM

## 2020-10-19 PROCEDURE — 99212 PR OFFICE/OUTPT VISIT, EST, LEVL II, 10-19 MIN: ICD-10-PCS | Mod: TH,,, | Performed by: STUDENT IN AN ORGANIZED HEALTH CARE EDUCATION/TRAINING PROGRAM

## 2020-10-19 PROCEDURE — 99212 OFFICE O/P EST SF 10 MIN: CPT | Mod: TH,,, | Performed by: STUDENT IN AN ORGANIZED HEALTH CARE EDUCATION/TRAINING PROGRAM

## 2020-10-19 NOTE — TELEPHONE ENCOUNTER
----- Message from Malu Araya MD sent at 10/19/2020  9:45 AM CDT -----  Please schedule IOL on 11/10 at [facility].  She is history of CD x 1, desires TOLAC.  She is 3/50/-3.  Thank you!!!

## 2020-10-26 ENCOUNTER — ROUTINE PRENATAL (OUTPATIENT)
Dept: OBSTETRICS AND GYNECOLOGY | Facility: CLINIC | Age: 25
End: 2020-10-26
Attending: STUDENT IN AN ORGANIZED HEALTH CARE EDUCATION/TRAINING PROGRAM
Payer: MEDICAID

## 2020-10-26 VITALS
BODY MASS INDEX: 34.06 KG/M2 | SYSTOLIC BLOOD PRESSURE: 102 MMHG | DIASTOLIC BLOOD PRESSURE: 72 MMHG | WEIGHT: 198.44 LBS

## 2020-10-26 DIAGNOSIS — Z3A.38 38 WEEKS GESTATION OF PREGNANCY: Primary | ICD-10-CM

## 2020-10-26 PROCEDURE — 99999 PR PBB SHADOW E&M-EST. PATIENT-LVL III: CPT | Mod: PBBFAC,,, | Performed by: STUDENT IN AN ORGANIZED HEALTH CARE EDUCATION/TRAINING PROGRAM

## 2020-10-26 PROCEDURE — 99212 OFFICE O/P EST SF 10 MIN: CPT | Mod: TH,,, | Performed by: STUDENT IN AN ORGANIZED HEALTH CARE EDUCATION/TRAINING PROGRAM

## 2020-10-26 PROCEDURE — 99999 PR PBB SHADOW E&M-EST. PATIENT-LVL III: ICD-10-PCS | Mod: PBBFAC,,, | Performed by: STUDENT IN AN ORGANIZED HEALTH CARE EDUCATION/TRAINING PROGRAM

## 2020-10-26 PROCEDURE — 99212 PR OFFICE/OUTPT VISIT, EST, LEVL II, 10-19 MIN: ICD-10-PCS | Mod: TH,,, | Performed by: STUDENT IN AN ORGANIZED HEALTH CARE EDUCATION/TRAINING PROGRAM

## 2020-10-26 PROCEDURE — 99213 OFFICE O/P EST LOW 20 MIN: CPT | Mod: PBBFAC,PN | Performed by: STUDENT IN AN ORGANIZED HEALTH CARE EDUCATION/TRAINING PROGRAM

## 2020-10-26 NOTE — PROGRESS NOTES
Doing well.  No complaints.  Reports fetal movement.  Denies contractions, leakage of fluid, vaginal bleeding.  SVE deferred per patient.  All questions answered.  RTC in 1 week or sooner if needed.

## 2020-10-27 ENCOUNTER — HOSPITAL ENCOUNTER (INPATIENT)
Facility: OTHER | Age: 25
LOS: 3 days | Discharge: HOME | End: 2020-10-30
Attending: OBSTETRICS & GYNECOLOGY | Admitting: OBSTETRICS & GYNECOLOGY
Payer: MEDICAID

## 2020-10-27 ENCOUNTER — ANESTHESIA EVENT (OUTPATIENT)
Dept: OBSTETRICS AND GYNECOLOGY | Facility: OTHER | Age: 25
End: 2020-10-27
Payer: MEDICAID

## 2020-10-27 ENCOUNTER — ANESTHESIA (OUTPATIENT)
Dept: OBSTETRICS AND GYNECOLOGY | Facility: OTHER | Age: 25
End: 2020-10-27
Payer: MEDICAID

## 2020-10-27 DIAGNOSIS — Z37.9 NORMAL LABOR: ICD-10-CM

## 2020-10-27 DIAGNOSIS — Z3A.38 38 WEEKS GESTATION OF PREGNANCY: ICD-10-CM

## 2020-10-27 DIAGNOSIS — Z98.891 PREVIOUS CESAREAN SECTION: Chronic | ICD-10-CM

## 2020-10-27 LAB
ABO + RH BLD: NORMAL
BASOPHILS # BLD AUTO: 0.01 K/UL (ref 0–0.2)
BASOPHILS NFR BLD: 0.1 % (ref 0–1.9)
BLD GP AB SCN CELLS X3 SERPL QL: NORMAL
DIFFERENTIAL METHOD: ABNORMAL
EOSINOPHIL # BLD AUTO: 0 K/UL (ref 0–0.5)
EOSINOPHIL NFR BLD: 0.4 % (ref 0–8)
ERYTHROCYTE [DISTWIDTH] IN BLOOD BY AUTOMATED COUNT: 18.2 % (ref 11.5–14.5)
HCT VFR BLD AUTO: 31.2 % (ref 37–48.5)
HGB BLD-MCNC: 10 G/DL (ref 12–16)
IMM GRANULOCYTES # BLD AUTO: 0.04 K/UL (ref 0–0.04)
IMM GRANULOCYTES NFR BLD AUTO: 0.5 % (ref 0–0.5)
LYMPHOCYTES # BLD AUTO: 2 K/UL (ref 1–4.8)
LYMPHOCYTES NFR BLD: 25.2 % (ref 18–48)
MCH RBC QN AUTO: 26.2 PG (ref 27–31)
MCHC RBC AUTO-ENTMCNC: 32.1 G/DL (ref 32–36)
MCV RBC AUTO: 82 FL (ref 82–98)
MONOCYTES # BLD AUTO: 0.7 K/UL (ref 0.3–1)
MONOCYTES NFR BLD: 9.2 % (ref 4–15)
NEUTROPHILS # BLD AUTO: 5.2 K/UL (ref 1.8–7.7)
NEUTROPHILS NFR BLD: 64.6 % (ref 38–73)
NRBC BLD-RTO: 0 /100 WBC
PLATELET # BLD AUTO: 141 K/UL (ref 150–350)
PMV BLD AUTO: 11.7 FL (ref 9.2–12.9)
RBC # BLD AUTO: 3.82 M/UL (ref 4–5.4)
SARS-COV-2 RDRP RESP QL NAA+PROBE: NEGATIVE
WBC # BLD AUTO: 8.04 K/UL (ref 3.9–12.7)

## 2020-10-27 PROCEDURE — 71000039 HC RECOVERY, EACH ADD'L HOUR: Performed by: STUDENT IN AN ORGANIZED HEALTH CARE EDUCATION/TRAINING PROGRAM

## 2020-10-27 PROCEDURE — 63600175 PHARM REV CODE 636 W HCPCS: Performed by: STUDENT IN AN ORGANIZED HEALTH CARE EDUCATION/TRAINING PROGRAM

## 2020-10-27 PROCEDURE — 85025 COMPLETE CBC W/AUTO DIFF WBC: CPT

## 2020-10-27 PROCEDURE — 01968 ANES/ANALG CS DLVR NEURAXIAL: CPT | Mod: AA,,, | Performed by: STUDENT IN AN ORGANIZED HEALTH CARE EDUCATION/TRAINING PROGRAM

## 2020-10-27 PROCEDURE — 59025 FETAL NON-STRESS TEST: CPT

## 2020-10-27 PROCEDURE — 25000003 PHARM REV CODE 250: Performed by: STUDENT IN AN ORGANIZED HEALTH CARE EDUCATION/TRAINING PROGRAM

## 2020-10-27 PROCEDURE — 36004724 HC OB OR TIME LEV III - 1ST 15 MIN: Performed by: STUDENT IN AN ORGANIZED HEALTH CARE EDUCATION/TRAINING PROGRAM

## 2020-10-27 PROCEDURE — 37000008 HC ANESTHESIA 1ST 15 MINUTES: Performed by: STUDENT IN AN ORGANIZED HEALTH CARE EDUCATION/TRAINING PROGRAM

## 2020-10-27 PROCEDURE — 99283 PR EMERGENCY DEPT VISIT,LEVEL III: ICD-10-PCS | Mod: 25,,, | Performed by: OBSTETRICS & GYNECOLOGY

## 2020-10-27 PROCEDURE — 62326 NJX INTERLAMINAR LMBR/SAC: CPT | Performed by: STUDENT IN AN ORGANIZED HEALTH CARE EDUCATION/TRAINING PROGRAM

## 2020-10-27 PROCEDURE — 37000009 HC ANESTHESIA EA ADD 15 MINS: Performed by: STUDENT IN AN ORGANIZED HEALTH CARE EDUCATION/TRAINING PROGRAM

## 2020-10-27 PROCEDURE — 99283 EMERGENCY DEPT VISIT LOW MDM: CPT | Mod: 25,,, | Performed by: OBSTETRICS & GYNECOLOGY

## 2020-10-27 PROCEDURE — 11000001 HC ACUTE MED/SURG PRIVATE ROOM

## 2020-10-27 PROCEDURE — 59514 CESAREAN DELIVERY ONLY: CPT | Mod: AA,,, | Performed by: STUDENT IN AN ORGANIZED HEALTH CARE EDUCATION/TRAINING PROGRAM

## 2020-10-27 PROCEDURE — 72100003 HC LABOR CARE, EA. ADDL. 8 HRS

## 2020-10-27 PROCEDURE — 36004725 HC OB OR TIME LEV III - EA ADD 15 MIN: Performed by: STUDENT IN AN ORGANIZED HEALTH CARE EDUCATION/TRAINING PROGRAM

## 2020-10-27 PROCEDURE — 59025 FETAL NON-STRESS TEST: CPT | Mod: 26,,, | Performed by: OBSTETRICS & GYNECOLOGY

## 2020-10-27 PROCEDURE — 27200710 HC EPIDURAL INFUSION PUMP SET: Performed by: STUDENT IN AN ORGANIZED HEALTH CARE EDUCATION/TRAINING PROGRAM

## 2020-10-27 PROCEDURE — 71000033 HC RECOVERY, INTIAL HOUR: Performed by: STUDENT IN AN ORGANIZED HEALTH CARE EDUCATION/TRAINING PROGRAM

## 2020-10-27 PROCEDURE — 86901 BLOOD TYPING SEROLOGIC RH(D): CPT

## 2020-10-27 PROCEDURE — 27200033

## 2020-10-27 PROCEDURE — C1751 CATH, INF, PER/CENT/MIDLINE: HCPCS | Performed by: STUDENT IN AN ORGANIZED HEALTH CARE EDUCATION/TRAINING PROGRAM

## 2020-10-27 PROCEDURE — 99285 EMERGENCY DEPT VISIT HI MDM: CPT | Mod: 25

## 2020-10-27 PROCEDURE — 59514 PR CESAREAN DELIVERY ONLY: ICD-10-PCS | Mod: AT,,, | Performed by: STUDENT IN AN ORGANIZED HEALTH CARE EDUCATION/TRAINING PROGRAM

## 2020-10-27 PROCEDURE — 72100002 HC LABOR CARE, 1ST 8 HOURS

## 2020-10-27 PROCEDURE — U0002 COVID-19 LAB TEST NON-CDC: HCPCS

## 2020-10-27 PROCEDURE — 59514 CESAREAN DELIVERY ONLY: CPT | Mod: AT,,, | Performed by: STUDENT IN AN ORGANIZED HEALTH CARE EDUCATION/TRAINING PROGRAM

## 2020-10-27 PROCEDURE — 51702 INSERT TEMP BLADDER CATH: CPT

## 2020-10-27 PROCEDURE — 01968 PR INSERT CATH,ART,PERCUT,SHORTTERM: ICD-10-PCS | Mod: AA,,, | Performed by: STUDENT IN AN ORGANIZED HEALTH CARE EDUCATION/TRAINING PROGRAM

## 2020-10-27 PROCEDURE — 25000003 PHARM REV CODE 250

## 2020-10-27 PROCEDURE — 59514 PRA REAN DELIVERY ONLY: ICD-10-PCS | Mod: AA,,, | Performed by: STUDENT IN AN ORGANIZED HEALTH CARE EDUCATION/TRAINING PROGRAM

## 2020-10-27 PROCEDURE — 59025 PR FETAL 2N-STRESS TEST: ICD-10-PCS | Mod: 26,,, | Performed by: OBSTETRICS & GYNECOLOGY

## 2020-10-27 RX ORDER — ONDANSETRON 2 MG/ML
4 INJECTION INTRAMUSCULAR; INTRAVENOUS EVERY 6 HOURS PRN
Status: ACTIVE | OUTPATIENT
Start: 2020-10-27 | End: 2020-10-28

## 2020-10-27 RX ORDER — CEFAZOLIN SODIUM 1 G/3ML
2 INJECTION, POWDER, FOR SOLUTION INTRAMUSCULAR; INTRAVENOUS ONCE AS NEEDED
Status: COMPLETED | OUTPATIENT
Start: 2020-10-27 | End: 2020-10-27

## 2020-10-27 RX ORDER — OXYTOCIN/RINGER'S LACTATE 30/500 ML
334 PLASTIC BAG, INJECTION (ML) INTRAVENOUS ONCE
Status: DISCONTINUED | OUTPATIENT
Start: 2020-10-27 | End: 2020-10-30 | Stop reason: HOSPADM

## 2020-10-27 RX ORDER — ONDANSETRON 8 MG/1
8 TABLET, ORALLY DISINTEGRATING ORAL EVERY 8 HOURS PRN
Status: DISCONTINUED | OUTPATIENT
Start: 2020-10-27 | End: 2020-10-27

## 2020-10-27 RX ORDER — ACETAMINOPHEN 325 MG/1
650 TABLET ORAL EVERY 6 HOURS
Status: DISPENSED | OUTPATIENT
Start: 2020-10-27 | End: 2020-10-28

## 2020-10-27 RX ORDER — FENTANYL/BUPIVACAINE/NS/PF 2MCG/ML-.1
PLASTIC BAG, INJECTION (ML) INJECTION CONTINUOUS PRN
Status: DISCONTINUED | OUTPATIENT
Start: 2020-10-27 | End: 2020-10-27

## 2020-10-27 RX ORDER — OXYCODONE HYDROCHLORIDE 5 MG/1
10 TABLET ORAL EVERY 4 HOURS PRN
Status: ACTIVE | OUTPATIENT
Start: 2020-10-27 | End: 2020-10-28

## 2020-10-27 RX ORDER — SODIUM CHLORIDE 9 MG/ML
INJECTION, SOLUTION INTRAVENOUS
Status: DISCONTINUED | OUTPATIENT
Start: 2020-10-27 | End: 2020-10-30 | Stop reason: HOSPADM

## 2020-10-27 RX ORDER — BUPIVACAINE HYDROCHLORIDE 2.5 MG/ML
INJECTION, SOLUTION EPIDURAL; INFILTRATION; INTRACAUDAL
Status: DISPENSED
Start: 2020-10-27 | End: 2020-10-28

## 2020-10-27 RX ORDER — SODIUM CHLORIDE, SODIUM LACTATE, POTASSIUM CHLORIDE, CALCIUM CHLORIDE 600; 310; 30; 20 MG/100ML; MG/100ML; MG/100ML; MG/100ML
INJECTION, SOLUTION INTRAVENOUS CONTINUOUS PRN
Status: DISCONTINUED | OUTPATIENT
Start: 2020-10-27 | End: 2020-10-27

## 2020-10-27 RX ORDER — ACETAMINOPHEN 500 MG
TABLET ORAL
Status: COMPLETED
Start: 2020-10-27 | End: 2020-10-27

## 2020-10-27 RX ORDER — OXYCODONE AND ACETAMINOPHEN 10; 325 MG/1; MG/1
1 TABLET ORAL EVERY 4 HOURS PRN
Status: DISCONTINUED | OUTPATIENT
Start: 2020-10-28 | End: 2020-10-30 | Stop reason: HOSPADM

## 2020-10-27 RX ORDER — PHENYLEPHRINE HYDROCHLORIDE 10 MG/ML
INJECTION INTRAVENOUS
Status: DISCONTINUED | OUTPATIENT
Start: 2020-10-27 | End: 2020-10-27

## 2020-10-27 RX ORDER — MUPIROCIN 20 MG/G
OINTMENT TOPICAL 2 TIMES DAILY
Status: DISCONTINUED | OUTPATIENT
Start: 2020-10-27 | End: 2020-10-30 | Stop reason: HOSPADM

## 2020-10-27 RX ORDER — MORPHINE SULFATE 0.5 MG/ML
INJECTION, SOLUTION EPIDURAL; INTRATHECAL; INTRAVENOUS
Status: DISCONTINUED | OUTPATIENT
Start: 2020-10-27 | End: 2020-10-27

## 2020-10-27 RX ORDER — MISOPROSTOL 200 UG/1
800 TABLET ORAL
Status: CANCELLED | OUTPATIENT
Start: 2020-10-27

## 2020-10-27 RX ORDER — ADHESIVE BANDAGE
30 BANDAGE TOPICAL 2 TIMES DAILY PRN
Status: DISCONTINUED | OUTPATIENT
Start: 2020-10-28 | End: 2020-10-30 | Stop reason: HOSPADM

## 2020-10-27 RX ORDER — BISACODYL 10 MG
10 SUPPOSITORY, RECTAL RECTAL ONCE AS NEEDED
Status: DISCONTINUED | OUTPATIENT
Start: 2020-10-27 | End: 2020-10-30 | Stop reason: HOSPADM

## 2020-10-27 RX ORDER — DOCUSATE SODIUM 100 MG/1
200 CAPSULE, LIQUID FILLED ORAL 2 TIMES DAILY
Status: DISCONTINUED | OUTPATIENT
Start: 2020-10-27 | End: 2020-10-30 | Stop reason: HOSPADM

## 2020-10-27 RX ORDER — OXYTOCIN/RINGER'S LACTATE 30/500 ML
2 PLASTIC BAG, INJECTION (ML) INTRAVENOUS CONTINUOUS
Status: DISCONTINUED | OUTPATIENT
Start: 2020-10-27 | End: 2020-10-27

## 2020-10-27 RX ORDER — OXYTOCIN/RINGER'S LACTATE 30/500 ML
95 PLASTIC BAG, INJECTION (ML) INTRAVENOUS ONCE
Status: DISCONTINUED | OUTPATIENT
Start: 2020-10-27 | End: 2020-10-30 | Stop reason: HOSPADM

## 2020-10-27 RX ORDER — BUPIVACAINE HYDROCHLORIDE 2.5 MG/ML
INJECTION, SOLUTION EPIDURAL; INFILTRATION; INTRACAUDAL
Status: DISPENSED
Start: 2020-10-27 | End: 2020-10-27

## 2020-10-27 RX ORDER — SIMETHICONE 80 MG
1 TABLET,CHEWABLE ORAL EVERY 6 HOURS PRN
Status: DISCONTINUED | OUTPATIENT
Start: 2020-10-27 | End: 2020-10-30 | Stop reason: HOSPADM

## 2020-10-27 RX ORDER — FAMOTIDINE 10 MG/ML
20 INJECTION INTRAVENOUS ONCE
Status: CANCELLED | OUTPATIENT
Start: 2020-10-27 | End: 2020-10-27

## 2020-10-27 RX ORDER — LIDOCAINE HCL/EPINEPHRINE/PF 2%-1:200K
VIAL (ML) INJECTION
Status: DISCONTINUED | OUTPATIENT
Start: 2020-10-27 | End: 2020-10-27

## 2020-10-27 RX ORDER — METHYLERGONOVINE MALEATE 0.2 MG/ML
200 INJECTION INTRAVENOUS
Status: CANCELLED | OUTPATIENT
Start: 2020-10-27

## 2020-10-27 RX ORDER — OXYCODONE AND ACETAMINOPHEN 5; 325 MG/1; MG/1
1 TABLET ORAL EVERY 4 HOURS PRN
Status: DISCONTINUED | OUTPATIENT
Start: 2020-10-28 | End: 2020-10-30 | Stop reason: HOSPADM

## 2020-10-27 RX ORDER — FENTANYL/BUPIVACAINE/NS/PF 2MCG/ML-.1
PLASTIC BAG, INJECTION (ML) INJECTION
Status: COMPLETED
Start: 2020-10-27 | End: 2020-10-27

## 2020-10-27 RX ORDER — CALCIUM CARBONATE 200(500)MG
500 TABLET,CHEWABLE ORAL 3 TIMES DAILY PRN
Status: DISCONTINUED | OUTPATIENT
Start: 2020-10-27 | End: 2020-10-30 | Stop reason: HOSPADM

## 2020-10-27 RX ORDER — SODIUM CITRATE AND CITRIC ACID MONOHYDRATE 334; 500 MG/5ML; MG/5ML
SOLUTION ORAL
Status: COMPLETED
Start: 2020-10-27 | End: 2020-10-27

## 2020-10-27 RX ORDER — KETOROLAC TROMETHAMINE 30 MG/ML
30 INJECTION, SOLUTION INTRAMUSCULAR; INTRAVENOUS EVERY 6 HOURS
Status: COMPLETED | OUTPATIENT
Start: 2020-10-27 | End: 2020-10-28

## 2020-10-27 RX ORDER — OXYTOCIN/RINGER'S LACTATE 30/500 ML
95 PLASTIC BAG, INJECTION (ML) INTRAVENOUS ONCE
Status: CANCELLED | OUTPATIENT
Start: 2020-10-27 | End: 2020-10-27

## 2020-10-27 RX ORDER — OXYCODONE HYDROCHLORIDE 5 MG/1
5 TABLET ORAL EVERY 4 HOURS PRN
Status: ACTIVE | OUTPATIENT
Start: 2020-10-27 | End: 2020-10-28

## 2020-10-27 RX ORDER — CARBOPROST TROMETHAMINE 250 UG/ML
250 INJECTION, SOLUTION INTRAMUSCULAR
Status: CANCELLED | OUTPATIENT
Start: 2020-10-27

## 2020-10-27 RX ORDER — PRENATAL WITH FERROUS FUM AND FOLIC ACID 3080; 920; 120; 400; 22; 1.84; 3; 20; 10; 1; 12; 200; 27; 25; 2 [IU]/1; [IU]/1; MG/1; [IU]/1; MG/1; MG/1; MG/1; MG/1; MG/1; MG/1; UG/1; MG/1; MG/1; MG/1; MG/1
1 TABLET ORAL DAILY
Status: DISCONTINUED | OUTPATIENT
Start: 2020-10-28 | End: 2020-10-30 | Stop reason: HOSPADM

## 2020-10-27 RX ORDER — ONDANSETRON 8 MG/1
8 TABLET, ORALLY DISINTEGRATING ORAL EVERY 8 HOURS PRN
Status: DISCONTINUED | OUTPATIENT
Start: 2020-10-28 | End: 2020-10-30 | Stop reason: HOSPADM

## 2020-10-27 RX ORDER — SODIUM CHLORIDE, SODIUM LACTATE, POTASSIUM CHLORIDE, CALCIUM CHLORIDE 600; 310; 30; 20 MG/100ML; MG/100ML; MG/100ML; MG/100ML
INJECTION, SOLUTION INTRAVENOUS CONTINUOUS
Status: DISCONTINUED | OUTPATIENT
Start: 2020-10-27 | End: 2020-10-30 | Stop reason: HOSPADM

## 2020-10-27 RX ORDER — SIMETHICONE 80 MG
1 TABLET,CHEWABLE ORAL 4 TIMES DAILY PRN
Status: DISCONTINUED | OUTPATIENT
Start: 2020-10-27 | End: 2020-10-27

## 2020-10-27 RX ORDER — FAMOTIDINE 10 MG/ML
INJECTION INTRAVENOUS
Status: COMPLETED
Start: 2020-10-27 | End: 2020-10-27

## 2020-10-27 RX ORDER — ONDANSETRON 2 MG/ML
INJECTION INTRAMUSCULAR; INTRAVENOUS
Status: DISCONTINUED | OUTPATIENT
Start: 2020-10-27 | End: 2020-10-27

## 2020-10-27 RX ORDER — AMOXICILLIN 250 MG
1 CAPSULE ORAL NIGHTLY PRN
Status: DISCONTINUED | OUTPATIENT
Start: 2020-10-27 | End: 2020-10-30 | Stop reason: HOSPADM

## 2020-10-27 RX ORDER — LIDOCAINE HYDROCHLORIDE AND EPINEPHRINE 15; 5 MG/ML; UG/ML
INJECTION, SOLUTION EPIDURAL
Status: DISCONTINUED | OUTPATIENT
Start: 2020-10-27 | End: 2020-10-27

## 2020-10-27 RX ORDER — HYDROCORTISONE 25 MG/G
CREAM TOPICAL 3 TIMES DAILY PRN
Status: DISCONTINUED | OUTPATIENT
Start: 2020-10-27 | End: 2020-10-30 | Stop reason: HOSPADM

## 2020-10-27 RX ORDER — SODIUM CITRATE AND CITRIC ACID MONOHYDRATE 334; 500 MG/5ML; MG/5ML
30 SOLUTION ORAL ONCE
Status: CANCELLED | OUTPATIENT
Start: 2020-10-27 | End: 2020-10-27

## 2020-10-27 RX ORDER — DEXAMETHASONE SODIUM PHOSPHATE 4 MG/ML
INJECTION, SOLUTION INTRA-ARTICULAR; INTRALESIONAL; INTRAMUSCULAR; INTRAVENOUS; SOFT TISSUE
Status: DISCONTINUED | OUTPATIENT
Start: 2020-10-27 | End: 2020-10-27

## 2020-10-27 RX ORDER — IBUPROFEN 600 MG/1
600 TABLET ORAL EVERY 6 HOURS
Status: DISCONTINUED | OUTPATIENT
Start: 2020-10-28 | End: 2020-10-30 | Stop reason: HOSPADM

## 2020-10-27 RX ORDER — DIPHENHYDRAMINE HCL 25 MG
25 CAPSULE ORAL EVERY 4 HOURS PRN
Status: DISCONTINUED | OUTPATIENT
Start: 2020-10-27 | End: 2020-10-30 | Stop reason: HOSPADM

## 2020-10-27 RX ORDER — FENTANYL CITRATE 50 UG/ML
INJECTION, SOLUTION INTRAMUSCULAR; INTRAVENOUS
Status: DISCONTINUED | OUTPATIENT
Start: 2020-10-27 | End: 2020-10-27

## 2020-10-27 RX ORDER — SODIUM CHLORIDE, SODIUM LACTATE, POTASSIUM CHLORIDE, CALCIUM CHLORIDE 600; 310; 30; 20 MG/100ML; MG/100ML; MG/100ML; MG/100ML
INJECTION, SOLUTION INTRAVENOUS CONTINUOUS
Status: ACTIVE | OUTPATIENT
Start: 2020-10-27 | End: 2020-10-28

## 2020-10-27 RX ORDER — OXYTOCIN/RINGER'S LACTATE 30/500 ML
95 PLASTIC BAG, INJECTION (ML) INTRAVENOUS ONCE
Status: COMPLETED | OUTPATIENT
Start: 2020-10-27 | End: 2020-10-27

## 2020-10-27 RX ORDER — FENTANYL CITRATE 50 UG/ML
INJECTION, SOLUTION INTRAMUSCULAR; INTRAVENOUS
Status: COMPLETED
Start: 2020-10-27 | End: 2020-10-27

## 2020-10-27 RX ADMIN — MUPIROCIN: 20 OINTMENT TOPICAL at 08:10

## 2020-10-27 RX ADMIN — KETOROLAC TROMETHAMINE 30 MG: 30 INJECTION, SOLUTION INTRAMUSCULAR; INTRAVENOUS at 09:10

## 2020-10-27 RX ADMIN — SODIUM CHLORIDE, SODIUM LACTATE, POTASSIUM CHLORIDE, AND CALCIUM CHLORIDE: 600; 310; 30; 20 INJECTION, SOLUTION INTRAVENOUS at 01:10

## 2020-10-27 RX ADMIN — KETOROLAC TROMETHAMINE 30 MG: 30 INJECTION, SOLUTION INTRAMUSCULAR; INTRAVENOUS at 04:10

## 2020-10-27 RX ADMIN — DEXTROSE 5 MILLION UNITS: 50 INJECTION, SOLUTION INTRAVENOUS at 08:10

## 2020-10-27 RX ADMIN — CEFAZOLIN 2 G: 330 INJECTION, POWDER, FOR SOLUTION INTRAMUSCULAR; INTRAVENOUS at 02:10

## 2020-10-27 RX ADMIN — PHENYLEPHRINE HYDROCHLORIDE 200 MCG: 10 INJECTION INTRAVENOUS at 02:10

## 2020-10-27 RX ADMIN — DOCUSATE SODIUM 200 MG: 100 CAPSULE, LIQUID FILLED ORAL at 08:10

## 2020-10-27 RX ADMIN — FENTANYL CITRATE 100 MCG: 50 INJECTION, SOLUTION INTRAMUSCULAR; INTRAVENOUS at 08:10

## 2020-10-27 RX ADMIN — PHENYLEPHRINE HYDROCHLORIDE 50 MCG/MIN: 10 INJECTION INTRAVENOUS at 02:10

## 2020-10-27 RX ADMIN — SIMETHICONE CHEW TAB 80 MG 80 MG: 80 TABLET ORAL at 04:10

## 2020-10-27 RX ADMIN — ACETAMINOPHEN 1000 MG: 500 TABLET, FILM COATED ORAL at 01:10

## 2020-10-27 RX ADMIN — LIDOCAINE HYDROCHLORIDE,EPINEPHRINE BITARTRATE 3 ML: 15; .005 INJECTION, SOLUTION EPIDURAL; INFILTRATION; INTRACAUDAL; PERINEURAL at 07:10

## 2020-10-27 RX ADMIN — AZITHROMYCIN MONOHYDRATE 500 MG: 500 INJECTION, POWDER, LYOPHILIZED, FOR SOLUTION INTRAVENOUS at 01:10

## 2020-10-27 RX ADMIN — FAMOTIDINE 20 MG: 10 INJECTION INTRAVENOUS at 01:10

## 2020-10-27 RX ADMIN — SODIUM CHLORIDE, SODIUM LACTATE, POTASSIUM CHLORIDE, AND CALCIUM CHLORIDE: 600; 310; 30; 20 INJECTION, SOLUTION INTRAVENOUS at 03:10

## 2020-10-27 RX ADMIN — Medication 95 MILLI-UNITS/MIN: at 04:10

## 2020-10-27 RX ADMIN — SODIUM CITRATE AND CITRIC ACID MONOHYDRATE 30 ML: 500; 334 SOLUTION ORAL at 02:10

## 2020-10-27 RX ADMIN — ONDANSETRON HYDROCHLORIDE 4 MG: 2 INJECTION INTRAMUSCULAR; INTRAVENOUS at 03:10

## 2020-10-27 RX ADMIN — Medication 5 ML: at 08:10

## 2020-10-27 RX ADMIN — SODIUM CHLORIDE, SODIUM LACTATE, POTASSIUM CHLORIDE, AND CALCIUM CHLORIDE 1000 ML: .6; .31; .03; .02 INJECTION, SOLUTION INTRAVENOUS at 07:10

## 2020-10-27 RX ADMIN — Medication 10 ML/HR: at 08:10

## 2020-10-27 RX ADMIN — ONDANSETRON 8 MG: 8 TABLET, ORALLY DISINTEGRATING ORAL at 09:10

## 2020-10-27 RX ADMIN — SODIUM CHLORIDE, SODIUM LACTATE, POTASSIUM CHLORIDE, AND CALCIUM CHLORIDE: .6; .31; .03; .02 INJECTION, SOLUTION INTRAVENOUS at 07:10

## 2020-10-27 RX ADMIN — DEXTROSE 3 MILLION UNITS: 50 INJECTION, SOLUTION INTRAVENOUS at 12:10

## 2020-10-27 RX ADMIN — Medication 1.5 MG: at 02:10

## 2020-10-27 RX ADMIN — LIDOCAINE HYDROCHLORIDE,EPINEPHRINE BITARTRATE 10 ML: 20; .005 INJECTION, SOLUTION EPIDURAL; INFILTRATION; INTRACAUDAL; PERINEURAL at 02:10

## 2020-10-27 RX ADMIN — DEXAMETHASONE SODIUM PHOSPHATE 4 MG: 4 INJECTION, SOLUTION INTRAMUSCULAR; INTRAVENOUS at 02:10

## 2020-10-27 NOTE — PLAN OF CARE
10/27/20 1700   OB SCREEN   Source of Information health record   Received Prenatal Care Yes   Any indications/suspicions for None   Is this a teen pregnancy No   Is the baby in NICU No   Indication for adoption/Safe Haven No   Indication for DME/post-acute needs No   HIV (+) No   Any congenital  disorders No   Fetal demise/ death No       This patient has been screened for Social Work discharge planning needs. Based on  documentation in medical record , no discharge planning needs are anticipated at this time. Should any discharge planning needs arise, please consult . For urgent needs/consults, contact the  listed below at the number provided.     Arlette Salinas LCSW    [facility]  [email or web address]    (phone) [phone] or  Ypi. [phone]  (fax) [phone]]

## 2020-10-27 NOTE — PROGRESS NOTES
"LABOR NOTE    Resident to bedside for fetal deceleration to 90 for 2 minutes.    S:  Complaints: No.  Epidural working:  yes  Upon entry into the room anesthesia and nursing at bedside. Anesthesia reports he recently placed epidural and noted decrease in maternal blood pressure (106/54). Patient repositioning to left side performed, after which fetal heart tones noted in 140s.     O: /80   Pulse 103   Temp 98.5 °F (36.9 °C) (Oral)   Resp 18   Ht 5' 4" (1.626 m)   Wt 90 kg (198 lb 6.6 oz)   LMP 2020 (Exact Date)   SpO2 97%   Breastfeeding No   BMI 34.06 kg/m²       FHT: Cat 2 (reassuring), baseline 130, mod BTBV, + accels, + decels (late decelerationg to 90 over 2 minutes, with return to baseline after repositioning  CTX: q 4-5 minutes  SVE: /-3      ASSESSMENT:   25 y.o.  at 38w2d, labor    FHT reassuring    Active Hospital Problems    Diagnosis  POA    *Normal labor [O80, Z37.9]  Not Applicable      Resolved Hospital Problems   No resolved problems to display.       TIMELINE:  630-3  0830 -3, fetal deceleration to 90, return to baseline after repositioning    PLAN:    Continue Close Maternal/Fetal Monitoring  Pitocin stopped at this time, will resume in 15 minutes if strip is reassuring  Recheck 2 hours or PRN    Sheron Singh M.D.  OB/GYN PGY-1  "

## 2020-10-27 NOTE — PROGRESS NOTES
MD to bedside for cervical exam. Patient with intermittent decelerations with no augmentation. Cervix remains unchanged. Given inability to augment with pitocin and presence of recurrent decelerations decision made to proceed with delivery via  section.     Plan formed by hospitalist and patient's primary provider.     Shiela Bingham MD  OBGYN, PGY-3

## 2020-10-27 NOTE — DISCHARGE INSTRUCTIONS
Breastfeeding Discharge Instructions       Feed the baby at the earliest sign of hunger or comfort  o Hands to mouth, sucking motions  o Rooting or searching for something to suck on  o Dont wait for crying - it is a sign of distress     The feedings may be 8-12 times per 24hrs and will not follow a schedule   Avoid pacifiers and bottles for the first 4 weeks   Alternate the breast you start the feeding with, or start with the breast that feels the fullest   Switch breasts when the baby takes himself off the breast or falls asleep   Keep offering breasts until the baby looks full, no longer gives hunger signs, and stays asleep when placed on his back in the crib   If the baby is sleepy and wont wake for a feeding, put the baby skin-to-skin dressed in a diaper against the mothers bare chest   Sleep near your baby   The baby should be positioned and latched on to the breast correctly  o Chest-to-chest, chin in the breast  o Babys lips are flipped outward  o Babys mouth is stretched open wide like a shout  o Babys sucking should feel like tugging to the mother  - The baby should be drinking at the breast:  o You should hear swallowing or gulping throughout the feeding  o You should see milk on the babys lips when he comes off the breast  o Your breasts should be softer when the baby is finished feeding  o The baby should look relaxed at the end of feedings  o After the 4th day and your milk is in:  o The babys poop should turn bright yellow and be loose, watery, and seedy  o The baby should have at least 3-4 poops the size of the palm of your hand per day  o The baby should have at least 5-6 wet diapers per day  o The urine should be light yellow in color  You should drink when you are thirsty and eat a healthy diet when you are    hungry.     Take naps to get the rest you need.   Take medications and/or drink alcohol only with permission of your obstetrician    or the babys pediatrician.  You can  also call the [facility]ant Risk Center,   ([phone]), Monday-Friday, 8am-5pm Central time, to get the most   up-to-date evidence-based information on the use of medications during   pregnancy and breastfeeding.      The baby should be examined by a pediatrician at 3-5 days of age.  Once your   milk comes in, the baby should be gaining at least ½ - 1oz each day and should be back to birthweight no later than 10-14 days of age.          Community Resources    [facility] Breastfeeding Warmline: [phone]   Local [facility] clinics: provide incentives and breastpumps to eligible mothers  [employer] L[employer]he [employer] (LLLI):  mother-to-mother support group website        www.Shutll.Your Tribute  Local La Leche League mother-to-mother support groups:        www.Snapflow        La Leche League [facility]   Dr. Severo Hernandez website for latch videos and general information:        www.breastfeedinginc.ca  [facility] Risk [facility] is a call center that provides information about the safety of taking medications while breastfeeding.  Call [phone], M-F, 8am-5pm, CT.  International Lactation Consultant Association provides resources for assistance:        www.ilca.org  [place] Breastfeeding Coalition provides informationand resources for parents  and the community    www.LaBreastfeedingSupport.org     Sobeida Palma is a mom-to-mom support group:                             www.[employer].com//breastfeedng-support/  Partners for Healthy Babies:  1-909-798-BABY(0044)  C[employer]ait: a breastfeeding support group for women of color, [phone]

## 2020-10-27 NOTE — TRANSFER OF CARE
"Anesthesia Transfer of Care Note    Patient: Yamel ARCINIEGA Catarino    Procedure(s) Performed: * No procedures listed *    Patient location: Labor and Delivery    Anesthesia Type: epidural    Transport from OR: Transported from OR on room air with adequate spontaneous ventilation    Post pain: adequate analgesia    Post assessment: no apparent anesthetic complications    Post vital signs: stable    Level of consciousness: awake    Nausea/Vomiting: no nausea/vomiting    Complications: none    Transfer of care protocol was followed      Last vitals:   Visit Vitals  BP (!) 101/58   Pulse (!) 117   Temp 36.9 °C (98.5 °F) (Oral)   Resp 18   Ht 5' 4" (1.626 m)   Wt 90 kg (198 lb 6.6 oz)   LMP 02/04/2020 (Exact Date)   SpO2 100%   Breastfeeding Unknown   BMI 34.06 kg/m²     "

## 2020-10-27 NOTE — H&P
HISTORY AND PHYSICAL                                                OBSTETRICS          Subjective:       Yamel Toribio is a 25 y.o.  female with IUP at 38w2d weeks gestation who c/o contractions. Patient reports that shortly prior to arrival, she was sitting in her tub when she noticed sudden onset of contractions which have been increasing in intensity and frequency. She also states that she may have broken her water however she is unsure as she was in the bath tub. In the [facility], patient was evaluated and found to be 4 cm dilated with painful regular contractions. Decision was made to admit patient to L&D for normal labor.     This IUP is complicated by history of CS x1 (desires TOLAC, was due to NRFHT), history of IUFD at 18 weeks (on [place] and ASA), iron deficiency anemia (history of transfusions x3) and GBS UTI.  Patient reports contractions, denies vaginal bleeding, reports possible LOF.   Fetal Movement: normal.     Denies any covid related symptoms.    Review of Systems   Constitutional: Negative for chills, diaphoresis and fever.   HENT: Negative for congestion, nosebleeds and sore throat.    Eyes: Negative for double vision and photophobia.   Respiratory: Negative for cough, sputum production and shortness of breath.    Cardiovascular: Negative for chest pain, palpitations, claudication and leg swelling.   Gastrointestinal: Negative for abdominal pain, constipation, diarrhea, nausea and vomiting.   Genitourinary: Negative for dysuria, frequency and hematuria.   Musculoskeletal: Negative for back pain, joint pain and myalgias.   Neurological: Negative for speech change, focal weakness and headaches.   Psychiatric/Behavioral: Negative for depression, hallucinations, substance abuse and suicidal ideas.       PMHx:   Past Medical History:   Diagnosis Date    Anemia     Iron deficiency    Asthma        PSHx:   Past Surgical History:   Procedure Laterality Date     SECTION  2018     [facility] (FTP & NRFHT's)       All: Review of patient's allergies indicates:  No Known Allergies    Meds: (Not in a hospital admission)      SH:   Social History     Socioeconomic History    Marital status: Single     Spouse name: Not on file    Number of children: Not on file    Years of education: Not on file    Highest education level: Not on file   Occupational History    Not on file   Social Needs    Financial resource strain: Not on file    Food insecurity     Worry: Not on file     Inability: Not on file    Transportation needs     Medical: Not on file     Non-medical: Not on file   Tobacco Use    Smoking status: Former Smoker    Smokeless tobacco: Never Used   Substance and Sexual Activity    Alcohol use: No    Drug use: No    Sexual activity: Yes     Partners: Male     Birth control/protection: None   Lifestyle    Physical activity     Days per week: Not on file     Minutes per session: Not on file    Stress: Not on file   Relationships    Social connections     Talks on phone: Not on file     Gets together: Not on file     Attends [employer] service: Not on file     Active member of club or organization: Not on file     Attends meetings of clubs or organizations: Not on file     Relationship status: Not on file   Other Topics Concern    Not on file   Social History Narrative    Not on file       FH:   Family History   Problem Relation Age of Onset    No Known Problems Father     No Known Problems Mother     No Known Problems Brother        OBHx:   OB History    Para Term  AB Living   4 1 1 0 2 1   SAB TAB Ectopic Multiple Live Births   0 1 0 0 1      # Outcome Date GA Lbr Lewis/2nd Weight Sex Delivery Anes PTL Lv   4 Current            3 AB 12/10/19 18w5d    SAB None Y FD   2 Term 18 40w1d  3.289 kg (7 lb 4 oz) F CS-LTranv EPI  TAHMINA      Complications:  labor in third trimester with term delivery, fetus 1   1 TAB                Objective:       /80   Pulse 103  "  Temp 98.5 °F (36.9 °C) (Oral)   Resp 18   Ht 5' 4" (1.626 m)   Wt 90 kg (198 lb 6.6 oz)   LMP 2020 (Exact Date)   SpO2 97%   Breastfeeding No   BMI 34.06 kg/m²     Vitals:    10/27/20 0545 10/27/20 0600 10/27/20 0609   BP: 120/74 137/80    Pulse: (!) 113 103    Resp: 18     Temp: 98.5 °F (36.9 °C)     TempSrc: Oral     SpO2: 98% 97%    Weight:   90 kg (198 lb 6.6 oz)   Height:   5' 4" (1.626 m)       General:   alert, appears stated age and cooperative   Lungs:   clear to auscultation bilaterally   Heart:   regular rate and rhythm, S1, S2 normal, no murmur, click, rub or gallop   Abdomen:  soft, non-tender; bowel sounds normal; no masses,  no organomegaly   Extremities negative edema, negative erythema   FHT: 145, +accels, intermittent variables Cat 2 (reassuring)                 TOCO: Q 1-2 minutes   Presentations: cephalic by ultrasound   Cervix:     Dilation: 4 cm    Effacement: 80%    Station:  -3 with BBOW    Consistency: medium    Position: posterior     Lab Review  Blood Type B NEG  GBBS: UTI   Rubella: immune   RPR: NR  HIV: negative  HepB: negative       Assessment:       38w2d weeks gestation who presents for normal labor     Active Hospital Problems    Diagnosis  POA    *Normal labor [O80, Z37.9]  Not Applicable      Resolved Hospital Problems   No resolved problems to display.          Plan:   1. Normal labor   Risks, benefits, alternatives and possible complications have been discussed in detail with the patient.   - Consents signed and to chart  - Admit to Labor and Delivery unit  - US: vertex position verified  - GBS UTI during T2, will start PCN for prophylaxis; no PCN allergy  - Will start pitocin as necessary, patient currently john q1-2   - Epidural per Anesthesia  - Draw CBC, T&S  - Notify Staff  - Recheck in 4 hrs or PRN    2. History of  section  - For NRFHT in 2018  - Desires to TOLAC, understands increased risk of uterine rupture  - Monitor closely     3. Rh " negative status  - S/p Rhogam on 08/17  - Will administer rhogam in post partum period if necessary     4. History of PTD  - IUFD at 18 weeks  - Has been on ASA and [place]   - Continue to monitor     5. Contraceptive management  - Patient desires post partum IUD placement  - Will order and have by bedside     6. Iron deficiency anemia  - Asymptomatic  - Last CBC as of 10/05: 10/34/143  - History of iron infusions x4  - Monitor closely     Post-Partum Hemorrhage risk - low        Stephy Dhaliwal MD  OB/GYN  PGY-3

## 2020-10-27 NOTE — PROGRESS NOTES
10/27/20 0839   TeleStork Cathi Note - Strip   Strip Reviewed by Cathi Nurse? Yes   TeleStork Cathi Note - Communication   Osborn Nurse Communicated with Bedside Nurse Regarding: Fetal Status   TeleStork Cathi Note - Notification   Nurse Notified? Yes   Name of Nurse RN at bs assessing pt- spoke to JAYCEE Baker RN

## 2020-10-27 NOTE — ANESTHESIA PREPROCEDURE EVALUATION
[facility]  Anesthesia Pre-Operative Evaluation         Patient Name: Yamel Toribio  YOB: 1995  MRN: [id number]    10/27/2020      Yamel Toribio is a 25 y.o. female  @ 38w2d who presents c/o contractions. Patient reports that shortly prior to arrival, she was sitting in her tub when she noticed sudden onset of contractions which have been increasing in intensity and frequency. She also states that she may have broken her water however she is unsure as she was in the bath tub. In the [facility], patient was evaluated and found to be 4 cm dilated with painful regular contractions. Decision was made to admit patient to L&D for normal labor.      This IUP is complicated by history of CS x1 (desires TOLAC, was due to NRFHT), history of IUFD at 18 weeks (on Polina and ASA), iron deficiency anemia (history of transfusions x3) and GBS UTI.  Patient reports contractions, denies vaginal bleeding, reports possible LOF.   Fetal Movement: normal.     OB History    Para Term  AB Living   4 1 1   2 1   SAB TAB Ectopic Multiple Live Births     1   0 1      # Outcome Date GA Lbr Lewis/2nd Weight Sex Delivery Anes PTL Lv   4 Current            3 AB 12/10/19 18w5d    SAB None Y FD   2 Term 18 40w1d  3.289 kg (7 lb 4 oz) F CS-LTranv EPI  TAHMINA      Complications:  labor in third trimester with term delivery, fetus 1   1 TAB                Review of patient's allergies indicates:  No Known Allergies    Wt Readings from Last 1 Encounters:   10/27/20 0609 90 kg (198 lb 6.6 oz)       BP Readings from Last 3 Encounters:   10/27/20 137/80   10/26/20 102/72   10/19/20 110/76       Patient Active Problem List   Diagnosis    Previous  section (NRFT/FTP - 18)    Spontaneous pregnancy loss    Rh negative status during pregnancy in second trimester    Threatened  in second trimester    Maternal iron deficiency anemia affecting pregnancy in second  trimester, antepartum    Normal labor       Past Surgical History:   Procedure Laterality Date     SECTION  2018    [facility] (FTP & NRFHT's)       Social History     Socioeconomic History    Marital status: Single     Spouse name: Not on file    Number of children: Not on file    Years of education: Not on file    Highest education level: Not on file   Occupational History    Not on file   Social Needs    Financial resource strain: Not on file    Food insecurity     Worry: Not on file     Inability: Not on file    Transportation needs     Medical: Not on file     Non-medical: Not on file   Tobacco Use    Smoking status: Former Smoker    Smokeless tobacco: Never Used   Substance and Sexual Activity    Alcohol use: No    Drug use: No    Sexual activity: Yes     Partners: Male     Birth control/protection: None   Lifestyle    Physical activity     Days per week: Not on file     Minutes per session: Not on file    Stress: Not on file   Relationships    Social connections     Talks on phone: Not on file     Gets together: Not on file     Attends Mosque service: Not on file     Active member of club or organization: Not on file     Attends meetings of clubs or organizations: Not on file     Relationship status: Not on file   Other Topics Concern    Not on file   Social History Narrative    Not on file         Chemistry        Component Value Date/Time     2020 0722    K 3.4 (L) 2020 0722     2020 0722    CO2 17 (L) 2020 0722    BUN 5 (L) 2020 0722    CREATININE 0.5 2020 0722     (H) 2020 0722        Component Value Date/Time    CALCIUM 8.9 2020 0722    ALKPHOS 51 2020 0722    AST 15 2020 0722    ALT 10 (L) 2020 0722    BILITOT 0.2 (L) 2020 0722    ESTGFRAFRICA >60.0 2020 0722    EGFRNONAA >60.0 2020 0722            Lab Results   Component Value Date    WBC 8.04 10/27/2020    HGB 10.0 (L)  10/27/2020    HCT 31.2 (L) 10/27/2020    MCV 82 10/27/2020     (L) 10/27/2020       No results for input(s): PT, INR, PROTIME, APTT in the last 72 hours.          Anesthesia Evaluation    I have reviewed the Patient Summary Reports.    I have reviewed the Nursing Notes.    I have reviewed the Medications.     Review of Systems  Anesthesia Hx:  No problems with previous Anesthesia Denies Hx of Anesthetic complications  History of prior surgery of interest to airway management or planning: Denies Family Hx of Anesthesia complications.   Denies Personal Hx of Anesthesia complications.   Social:  No Alcohol Use, Non-Smoker    Hematology/Oncology:  Hematology Normal        Cardiovascular:  Cardiovascular Normal  ECG has been reviewed.    Pulmonary:  Pulmonary Normal    Renal/:  Renal/ Normal     Musculoskeletal:  Musculoskeletal Normal    Neurological:  Neurology Normal        Physical Exam  General:  Well nourished    Airway/Jaw/Neck:  Airway Findings: Mouth Opening: Normal Tongue: Normal  General Airway Assessment: Adult  Mallampati: I  TM Distance: Normal, at least 6 cm  Jaw/Neck Findings:  Neck ROM: Normal ROM     Eyes/Ears/Nose:  EYES/EARS/NOSE FINDINGS: Normal   Dental:  Dental Findings: In tact   Chest/Lungs:  Chest/Lungs Findings: Clear to auscultation     Heart/Vascular:  Heart Findings: Rate: Normal  Rhythm: Regular Rhythm  Sounds: Normal     Abdomen:  Abdomen Findings:  Normal     Musculoskeletal:  Musculoskeletal Findings:    Skin:  Skin Findings:     Mental Status:  Mental Status Findings:  Cooperative, Alert and Oriented         Anesthesia Plan  Type of Anesthesia, risks & benefits discussed:  Anesthesia Type:  epidural, general, CSE, spinal  Patient's Preference:   Intra-op Monitoring Plan: standard ASA monitors  Intra-op Monitoring Plan Comments:   Post Op Pain Control Plan: multimodal analgesia  Post Op Pain Control Plan Comments:   Induction:   rapid sequence  Beta Blocker:  Patient is not  currently on a Beta-Blocker (No further documentation required).       Informed Consent: Patient understands risks and agrees with Anesthesia plan.  Questions answered. Anesthesia consent signed with patient.  ASA Score: 2     Day of Surgery Review of History & Physical:    H&P update referred to the provider.         Ready For Surgery From Anesthesia Perspective.

## 2020-10-27 NOTE — ANESTHESIA PROCEDURE NOTES
Epidural    Patient location during procedure: OB   Reason for block: primary anesthetic   Diagnosis: iup   Start time: 10/27/2020 7:08 AM  Timeout: 10/27/2020 7:08 AM  End time: 10/27/2020 8:00 AM    Staffing  Performing Provider: Arnold Peacock MD  Authorizing Provider: Lev Pretty MD        Preanesthetic Checklist  Completed: patient identified, site marked, surgical consent, pre-op evaluation, timeout performed, IV checked, risks and benefits discussed, monitors and equipment checked, anesthesia consent given, hand hygiene performed and patient being monitored  Preparation  Patient position: sitting  Prep: ChloraPrep  Patient monitoring: ECG, Pulse Ox and Blood Pressure  Epidural  Skin Anesthetic: lidocaine 1%  Administration type: continuous  Approach: midline  Interspace: L4-5    Injection technique: LIZ air  Needle and Epidural Catheter  Needle type: Tuohy   Needle gauge: 17  Needle length: 3.5 inches  Needle insertion depth: 8 cm  Catheter type: springwound and multi-orifice  Catheter size: 19 G  Catheter at skin depth: 12 cm  Test dose: 3 mL of lidocaine 1.5% with Epi 1-to-200,000  Additional Documentation: negative aspiration for heme and CSF, no signs/symptoms of IV or SA injection, no paresthesia on injection, no significant pain on injection, no significant complaints from patient and incremental injection  Needle localization: anatomical landmarks  Assessment  Ease of block: difficult  Patient's tolerance of the procedure: comfortable throughout block  Additional Notes  3 attempts. Initial attempt at L3-L4 interspace, LIZ w/air at 7cm, CSF return through spinal needle, space expanded with 3mL sterile saline, catheter threaded to 11 cm but hard to flush through. Catheter replaced at L2-L3 interspace, again with LIZ w/air at 7cm, catheter again hard to flush at 11 cm. Final attempt with LIZ at 7cm, CSF return through spinal needle, tuohy then advanced 1cm further to 8cm.  No inadvertent  dural puncture with Tuohy.  Dural puncture performed with spinal needle.

## 2020-10-27 NOTE — L&D DELIVERY NOTE
[facility]  [facility]  [facility]    SUMMARY     Date of Procedure: 10/27/2020     Procedure: Procedure(s) (LRB):   SECTION (N/A)       Surgeon: Malu Araya M.D.    Assistant: Sheron Singh, PGY 1     Assisting Surgeon: None    Pre-Operative Diagnosis: Fetal Intolerance of Labor, h/o CD x 1.    Post-Operative Diagnosis: Post-Op Diagnosis Codes:     * Pregnant [Z34.90]    Anesthesia: Epidural    Complications: No    Estimated Blood Loss (EBL): * 1200 mL *    Findings:   Anterior adhesions of uterus to peritoneum.  Uterine window preset at lower uterine segment.  Normal appearing tubes, and ovaries. Liveborn male infant with APGARs 9/9    Procedure in detail:    The patient was taken to the operating room where epidural/spinal anesthesia was found to be adequate. She was then prepped and draped in the normal sterile fashion. A vdial catheter was in place. Adequate anesthesia was then confirmed using allis clamps.    After a Time Out was performed, a scalpel was used to make a Pfannensteil incision. The knife was used to carry down to the underlying layer of fascia. The fascia was then incised in the midline. The curved christine scissors were then used to extend the fascia incision laterally. The fascia was then elevated using the kocher clamps and extended both inferiorly and superiorly using the curved christine scissors. The rectus muscles were then  in the midline and the peritoneum was then entered sharply and extended bluntly and sharply. Adhesions of the antior uterus to the peritoneum were noted and dissected without difficulty.  The bladder blade was then inserted and the vesicouterine peritoneum was identified and incised with the Metzenbaum scissors and extended laterally to create a bladder flap. The bladder blade was then reinserted.     The inside scalpel was then used to make a LOW TRANSVERSE incision in the uterus. This was extended bluntly. The amniotic fluid was noted  to be clear. The infant's head was delivered atraumatically followed by the remainder of the infant's body. The nose and mouth were suctioned with the bulb suction. The cord was clamped and cut and the baby was handed off to awaiting pediatric attendant. The placenta was then removed manually and the uterus was then exteriorized and cleared of all clots and debris. The bladder blade was then reinserted.     The uterine incision was then closed using a 0 vicryl suture in a running locked fashion. A second imbricating layer was used to close a second layer and hemostasis was noted. 2-0 vicryl and 2-0 chromic suture were then used for hemostasis at anterior uterus were adhesions were taken down.  The uterus was then returned to the abdomen. Irvka was also applied at the location of prior scarring at the uterus and hemostasis was noted.  The bladder blade was replaced and the uterine incision was reexamined and noted to have excellent hemostasis.     The peritoneum and rectus muscles were then re approximated using 2-0 Vicryl in a running fashion. The rectus muscles were then irrigated and and bleeding areas were cauterized using the Bovie. When hemostasis was confirmed the fascia was then closed using 0 Vicryl in a running fashion. The subcutaneous tissue was cauterized as needed and hemostasis noted. 2-0 plain gut was then used to re approximate this layer. 4-0 Monocryl was then used to close the skin in a subcuticular fashion. The patient tolerated the procedure well. Sponge lap and needle counts were correct x 2.     Delivery Information for Maurice Toribio    Birth information:  YOB: 2020   Time of birth: 2:33 PM   Sex: male   Head Delivery Date/Time: 10/27/2020  2:33 PM   Delivery type: , Low Transverse   Gestational Age: 38w2d    Delivery Providers    Delivering clinician: Malu Araya MD   Provider Role    MD Joyce Jackson RN              Measurements    Weight: 2807 g  Length: 45.7 cm  Head circumference: 33 cm  Chest circumference: 31.8 cm         Apgars    Living status: Living  Apgars:  1 min.:  5 min.:  10 min.:  15 min.:  20 min.:    Skin color:  1  1       Heart rate:  2  2       Reflex irritability:  2  2       Muscle tone:  2  2       Respiratory effort:  2  2       Total:  9  9       Apgars assigned by: NICU         Operative Delivery    Forceps attempted?: No  Vacuum extractor attempted?: No         Shoulder Dystocia    Shoulder dystocia present?: No           Presentation    Presentation: Vertex           Interventions/Resuscitation    Method: NICU Attended       Cord    Vessels: 3 vessels  Complications: Nuchal  Nuchal Intervention: reduced  Nuchal Cord Description: tight nuchal cord  Number of Loops: 1  Delayed Cord Clamping?: Yes  Cord Blood Disposition: Sent with Baby  Gases Sent?: No       Placenta    Placenta delivery date/time: 10/27/2020 143  Placenta removal: Manual removal  Placenta appearance: Intact  Placenta disposition: discarded           Labor Events:       labor: No     Labor Onset Date/Time:         Dilation Complete Date/Time:         Start Pushing Date/Time:       Rupture Date/Time:            Rupture type:          Fluid Amount:       Fluid Color:        steroids: None     Antibiotics given for GBS: Yes     Induction: none     Indications for induction:        Augmentation: amniotomy     Indications for augmentation:       Labor complications: Fetal Intolerance     Additional complications:          Cervical ripening:                     Delivery:      Episiotomy: None     Indication for Episiotomy:       Perineal Lacerations: None Repaired:      Periurethral Laceration:   Repaired:     Labial Laceration:   Repaired:     Sulcus Laceration:   Repaired:     Vaginal Laceration:   Repaired:     Cervical Laceration:   Repaired:     Repair suture:       Repair # of packets:       Last Value - EBL -  Nursing (mL): 0     Sum - EBL - Nursing (mL): 0     Last Value - EBL - Anesthesia (mL):      Calculated QBL (mL): 1120      Vaginal Sweep Performed:       Surgicount Correct:         Other providers:       Anesthesia    Method: Epidural          Details (if applicable):  Trial of Labor      Categorization: Repeat    Priority: Routine   Indications for : Fetal Intolerance of Labor;Repeat Section   Incision Type: low transverse     Additional  information:  Forceps:    Vacuum:    Breech:    Observed anomalies    Other (Comments):

## 2020-10-27 NOTE — ED PROVIDER NOTES
Encounter Date: 10/27/2020       History     Chief Complaint   Patient presents with    Contractions    Vaginal Bleeding     Yamel Toribio is a 25 y.o. [id number] at 38w2d presents complaining of contractions and leakage of fluid while in bathtub.  This IUP is complicated by hx LTCS and desires to TOLAC and GBS UTI earlier in pregnancy.  Patient reports contractions, denies vaginal bleeding, reports LOF.   Fetal Movement: normal.      Review of patient's allergies indicates:  No Known Allergies  Past Medical History:   Diagnosis Date    Anemia     Iron deficiency    Asthma      Past Surgical History:   Procedure Laterality Date     SECTION  2018    [facility] (FTP & NRFHT's)     Family History   Problem Relation Age of Onset    No Known Problems Father     No Known Problems Mother     No Known Problems Brother      Social History     Tobacco Use    Smoking status: Former Smoker    Smokeless tobacco: Never Used   Substance Use Topics    Alcohol use: No    Drug use: No     Review of Systems   Constitutional: Negative for chills and fever.   HENT: Negative for sore throat.    Eyes: Negative for visual disturbance.   Respiratory: Negative for shortness of breath.    Cardiovascular: Negative for chest pain and palpitations.   Gastrointestinal: Positive for abdominal distention. Negative for diarrhea and vomiting.   Genitourinary: Positive for vaginal discharge. Negative for difficulty urinating, dysuria, flank pain, pelvic pain, vaginal bleeding and vaginal pain.   Musculoskeletal: Negative for back pain and myalgias.   Skin: Negative for color change.   Neurological: Negative for dizziness, light-headedness and headaches.   Psychiatric/Behavioral: Negative for confusion.   All other systems reviewed and are negative.      Physical Exam     Initial Vitals [10/27/20 0545]   BP Pulse Resp Temp SpO2   120/74 (!) 113 18 98.5 °F (36.9 °C) 98 %      MAP       --         Physical Exam    Vitals  reviewed.  Constitutional: She appears well-developed and well-nourished. No distress.   HENT:   Head: Normocephalic and atraumatic.   Eyes: EOM are normal.   Cardiovascular: Normal rate.   Pulmonary/Chest: No respiratory distress. She has no wheezes.   Abdominal: Soft. There is no abdominal tenderness. There is no rebound and no guarding.   Genitourinary:    Uterus normal.     Musculoskeletal: Normal range of motion. No tenderness or edema.   Neurological: She is alert and oriented to person, place, and time.   Skin: Skin is warm, dry and intact. No rash noted.   Psychiatric: She has a normal mood and affect. Her speech is normal and behavior is normal.     OB LABOR EXAM:   Pre-Term Labor: No.   Membranes ruptured: No.   Method: Sterile vaginal exam per MD and Sterile speculum exam per MD.   Vaginal Bleeding: none present.     Dilatation: 4.   Station: -2.   Effacement: 80%.   Amniotic Fluid Color: no fluid.     Comments: Pooling/ferning/nitrazine negative       ED Course   Fetal non-stress test    Date/Time: 10/27/2020 6:19 AM  Performed by: Jean-Pierre Torres MD  Authorized by: Malu Araya MD     Nonstress Test:     Variability:  6-25 BPM    Decelerations:  Variable    Accelerations:  15 bpm    Baseline:  145    Contractions:  Regular    Contraction Frequency:  1-3  Biophysical Profile:     Nonstress Test Interpretation: reactive      Overall Impression:  Reassuring      Labs Reviewed   SARS-COV-2 RNA AMPLIFICATION, QUAL          Imaging Results    None          Medical Decision Making:   ED Management:  VSS  ROM exam negative  4/80/-2  Desires to TOLAC  Consents in chart  Vertex on US  Penicillin for GBS UTI  Desires epidural  Desires immediate Mirena  Discussed w/ staff  Admit to L&D              Attending Attestation:   Physician Attestation Statement for Resident:  As the supervising MD   Physician Attestation Statement: I have personally seen and examined this patient.   I agree with the above  history. -:   As the supervising MD I agree with the above PE.    As the supervising MD I agree with the above treatment, course, plan, and disposition.   -: Patient evaluated and found to be stable, agree with resident's assessment and plan.  I was personally present during the critical portions of the procedure(s) performed by the resident and was immediately available in the ED to provide services and assistance as needed during the entire procedure.  I have reviewed the following: old records at this facility.                              Clinical Impression:       ICD-10-CM ICD-9-CM   1. Normal labor  O80 650    Z37.9    2. 38 weeks gestation of pregnancy  Z3A.38 V22.2   3. Previous  section (NRFT/FTP - 18)  Z98.891 V45.89                      Disposition:   Disposition: Admitted  Condition: Stable     ED Disposition Condition    Send to L&D              SHERMAN Torres MD  OBGYN PGY2                Jean-Pierre Torres MD  Resident  10/27/20 0622       Ibis López MD  10/27/20 7706

## 2020-10-28 LAB
BASOPHILS # BLD AUTO: 0.02 K/UL (ref 0–0.2)
BASOPHILS NFR BLD: 0.1 % (ref 0–1.9)
DIFFERENTIAL METHOD: ABNORMAL
EOSINOPHIL # BLD AUTO: 0 K/UL (ref 0–0.5)
EOSINOPHIL NFR BLD: 0 % (ref 0–8)
ERYTHROCYTE [DISTWIDTH] IN BLOOD BY AUTOMATED COUNT: 17.9 % (ref 11.5–14.5)
HCT VFR BLD AUTO: 27.4 % (ref 37–48.5)
HGB BLD-MCNC: 8.7 G/DL (ref 12–16)
IMM GRANULOCYTES # BLD AUTO: 0.1 K/UL (ref 0–0.04)
IMM GRANULOCYTES NFR BLD AUTO: 0.6 % (ref 0–0.5)
LYMPHOCYTES # BLD AUTO: 1.5 K/UL (ref 1–4.8)
LYMPHOCYTES NFR BLD: 8.9 % (ref 18–48)
MCH RBC QN AUTO: 26.4 PG (ref 27–31)
MCHC RBC AUTO-ENTMCNC: 31.8 G/DL (ref 32–36)
MCV RBC AUTO: 83 FL (ref 82–98)
MONOCYTES # BLD AUTO: 0.9 K/UL (ref 0.3–1)
MONOCYTES NFR BLD: 5.4 % (ref 4–15)
NEUTROPHILS # BLD AUTO: 14.7 K/UL (ref 1.8–7.7)
NEUTROPHILS NFR BLD: 85 % (ref 38–73)
NRBC BLD-RTO: 0 /100 WBC
PLATELET # BLD AUTO: 135 K/UL (ref 150–350)
PMV BLD AUTO: 12.3 FL (ref 9.2–12.9)
RBC # BLD AUTO: 3.29 M/UL (ref 4–5.4)
WBC # BLD AUTO: 17.24 K/UL (ref 3.9–12.7)

## 2020-10-28 PROCEDURE — 25000003 PHARM REV CODE 250: Performed by: STUDENT IN AN ORGANIZED HEALTH CARE EDUCATION/TRAINING PROGRAM

## 2020-10-28 PROCEDURE — 85025 COMPLETE CBC W/AUTO DIFF WBC: CPT

## 2020-10-28 PROCEDURE — 99024 PR POST-OP FOLLOW-UP VISIT: ICD-10-PCS | Mod: ,,, | Performed by: OBSTETRICS & GYNECOLOGY

## 2020-10-28 PROCEDURE — 63600175 PHARM REV CODE 636 W HCPCS: Performed by: STUDENT IN AN ORGANIZED HEALTH CARE EDUCATION/TRAINING PROGRAM

## 2020-10-28 PROCEDURE — 36415 COLL VENOUS BLD VENIPUNCTURE: CPT

## 2020-10-28 PROCEDURE — 11000001 HC ACUTE MED/SURG PRIVATE ROOM

## 2020-10-28 PROCEDURE — 99024 POSTOP FOLLOW-UP VISIT: CPT | Mod: ,,, | Performed by: OBSTETRICS & GYNECOLOGY

## 2020-10-28 RX ORDER — IBUPROFEN 600 MG/1
600 TABLET ORAL EVERY 6 HOURS
Qty: 40 TABLET | Refills: 0 | Status: SHIPPED | OUTPATIENT
Start: 2020-10-28 | End: 2021-01-13

## 2020-10-28 RX ORDER — DOCUSATE SODIUM 100 MG/1
200 CAPSULE, LIQUID FILLED ORAL 2 TIMES DAILY
Qty: 30 CAPSULE | Refills: 0 | Status: SHIPPED | OUTPATIENT
Start: 2020-10-28 | End: 2021-01-13

## 2020-10-28 RX ORDER — OXYCODONE AND ACETAMINOPHEN 5; 325 MG/1; MG/1
1 TABLET ORAL EVERY 4 HOURS PRN
Qty: 20 TABLET | Refills: 0 | Status: SHIPPED | OUTPATIENT
Start: 2020-10-28 | End: 2021-01-13

## 2020-10-28 RX ADMIN — DOCUSATE SODIUM 200 MG: 100 CAPSULE, LIQUID FILLED ORAL at 08:10

## 2020-10-28 RX ADMIN — OXYCODONE HYDROCHLORIDE AND ACETAMINOPHEN 1 TABLET: 5; 325 TABLET ORAL at 05:10

## 2020-10-28 RX ADMIN — KETOROLAC TROMETHAMINE 30 MG: 30 INJECTION, SOLUTION INTRAMUSCULAR; INTRAVENOUS at 04:10

## 2020-10-28 RX ADMIN — KETOROLAC TROMETHAMINE 30 MG: 30 INJECTION, SOLUTION INTRAMUSCULAR; INTRAVENOUS at 12:10

## 2020-10-28 RX ADMIN — SIMETHICONE CHEW TAB 80 MG 80 MG: 80 TABLET ORAL at 05:10

## 2020-10-28 RX ADMIN — OXYCODONE HYDROCHLORIDE AND ACETAMINOPHEN 1 TABLET: 10; 325 TABLET ORAL at 11:10

## 2020-10-28 RX ADMIN — MUPIROCIN: 20 OINTMENT TOPICAL at 08:10

## 2020-10-28 RX ADMIN — ACETAMINOPHEN 650 MG: 325 TABLET, FILM COATED ORAL at 09:10

## 2020-10-28 RX ADMIN — IBUPROFEN 600 MG: 600 TABLET, FILM COATED ORAL at 06:10

## 2020-10-28 RX ADMIN — PRENATAL VIT W/ FE FUMARATE-FA TAB 27-0.8 MG 1 TABLET: 27-0.8 TAB at 08:10

## 2020-10-28 NOTE — PHYSICIAN QUERY
"PT Name: Yamel Toribio  MR #: [id number]    Hematology Clarification     CDS/: Parvin Pimentel,ROBIN,RNC-MNN         Contact information:[email or web address]  This form is a permanent document in the medical record.     Query Date: October 28, 2020    By submitting this query, we are merely seeking further clarification of documentation. Please utilize your independent clinical judgment when addressing the question(s) below.    The medical record contains the following:   Indicators Supporting Clinical Findings Location in Medical Record    "Thrombocytopenia" documented      X Platelets [id number]=816-->135 LAB 10/27-10/28   X Acute bleeding, Petechiae, Bruising Estimated Blood Loss (EBL): * 1200 mL * L&D Delivery note 10/27    Anticoagulant medication      Transfusion(s)      Treatments     X Other IUP at 38w2d weeks gestation who c/o contractions    This IUP is complicated by history of CS x1 (desires TOLAC, was due to NRFHT), history of IUFD at 18 weeks (on Echo and ASA), iron deficiency anemia (history of transfusions x3) and GBS UTI.  H&P 10/27       Provider, please specify diagnosis or diagnoses associated with above clinical findings.    [   ] Primary thrombocytopenia   [   ] Secondary thrombocytopenia related to (please specify): _____________   [x   ] Unspecified thrombocytopenia   [   ] Other hematological diagnosis (please specify): ________________   [  ] Clinically Undetermined       Present on admission (POA) status:   [   ] Yes (Y)                          [  ] Clinically Undetermined (W)  [   ] No (N)                            [   ] Documentation insufficient to determine if condition is POA (U)     Please document in your progress notes daily for the duration of treatment, until resolved, and include in your discharge summary.                                                                                                                                                                    "

## 2020-10-28 NOTE — ANESTHESIA POSTPROCEDURE EVALUATION
Anesthesia Post Evaluation    Patient: Yamel Toribio    Procedure(s) Performed: Procedure(s) (LRB):   SECTION (N/A)    Final Anesthesia Type: epidural    Patient location during evaluation: labor & delivery  Patient participation: Yes- Able to Participate  Level of consciousness: oriented and awake and alert  Post-procedure vital signs: reviewed and stable  Pain management: adequate  Airway patency: patent    PONV status at discharge: No PONV  Anesthetic complications: no      Cardiovascular status: blood pressure returned to baseline and hemodynamically stable  Respiratory status: unassisted  Hydration status: euvolemic  Follow-up not needed.          Vitals Value Taken Time   /55 10/28/20 0825   Temp 36.8 °C (98.2 °F) 10/28/20 08   Pulse 72 10/28/20 08   Resp 18 10/28/20 0825   SpO2 98 % 10/28/20 0825         Event Time   Out of Recovery 17:15:00         Pain/Lachelle Score: Pain Rating Prior to Med Admin: 8 (10/28/2020 12:12 PM)  Pain Rating Post Med Admin: 5 (10/28/2020 10:30 AM)

## 2020-10-28 NOTE — LACTATION NOTE
Lactation Round: Lactation Basics education completed. LC reviewed Breastfeeding Guide and encouraged tracking feeds and output. Encouraged use of STS, frequent feeds on based on baby's cues, and avoiding artificial nipples. Pt verbalized understanding and questions answered. Pt aware to call LC for assistance with feeding.

## 2020-10-28 NOTE — HOSPITAL COURSE
Patient underwent repeat  without complication due to concerns re fetal wellbeing.  POD 1: Patient reports good pain control, tolerating diet. R/B/A of circumcision discussed, father of the baby is unsure and requests additional time to make a decision.  POD 2: Patient is doing well and has no unusual complaints.  She and her spouse remain uncertain about  circumcision.   POD 3: Uncomplicated postoperative course. Ready for discharge today. Declines circumcision.

## 2020-10-28 NOTE — PROGRESS NOTES
[facility]  Obstetrics  Postpartum Progress Note    Patient Name: Yamel Toribio  MRN: [id number]  Admission Date: 10/27/2020  Hospital Length of Stay: 1 days  Attending Physician: Malu Araya MD  Primary Care Provider: Eddie Brtio MD    Subjective:     Principal Problem: delivery delivered    Hospital Course:  Patient underwent repeat  without complication due to concerns re fetal wellbeing.  POD 1: Patient reports good pain control, tolerating diet. R/B/A of circumcision discussed, father of the baby is unsure and requests additional time to make a decision.    Interval History:     She is doing well this morning. She is tolerating a regular diet without nausea or vomiting. She is voiding spontaneously. She is ambulating. She has passed flatus, and has not a BM. Vaginal bleeding is mild. She denies fever or chills. Abdominal pain is moderate and controlled with oral medications. She is breastfeeding. She is undecided reagarding circumcision for her male baby    Objective:     Vital Signs (Most Recent):  Temp: 98.2 °F (36.8 °C) (10/28/20 0825)  Pulse: 72 (10/28/20 0825)  Resp: 18 (10/28/20 0825)  BP: (!) 107/55 (10/28/20 0825)  SpO2: 98 % (10/28/20 0825) Vital Signs (24h Range):  Temp:  [97.8 °F (36.6 °C)-98.5 °F (36.9 °C)] 98.2 °F (36.8 °C)  Pulse:  [] 72  Resp:  [18] 18  SpO2:  [92 %-100 %] 98 %  BP: ()/(52-70) 107/55     Weight: 90 kg (198 lb 6.6 oz)  Body mass index is 34.06 kg/m².      Intake/Output Summary (Last 24 hours) at 10/28/2020 1004  Last data filed at 10/28/2020 0622  Gross per 24 hour   Intake 2110 ml   Output 3080 ml   Net -970 ml           Significant Labs:  Lab Results   Component Value Date    GROUPTRH B NEG 10/27/2020    HEPBSAG Negative 2020    STREPBCULT No Group B Streptococcus isolated 10/12/2020     Recent Labs   Lab 10/28/20  0309   HGB 8.7*   HCT 27.4*       I have personallly reviewed all pertinent lab results from the  last 24 hours.    Physical Exam:   Constitutional: She is oriented to person, place, and time. She appears well-developed and well-nourished.       Cardiovascular: Normal rate.     Pulmonary/Chest: Effort normal.        Abdominal: Soft. She exhibits abdominal incision (Clean, dry and intact). Tenderness: Mild incisional tenderness, no fundal tenderness.     Genitourinary:    Uterus normal.             Musculoskeletal: Edema (1+ edema) present. No tenderness.       Neurological: She is alert and oriented to person, place, and time.     Psychiatric: She has a normal mood and affect.       Assessment/Plan:     25 y.o. female  for:    Previous  section (NRFT/FTP - 18)  TOLAC        Disposition: As patient meets milestones, will plan to discharge when stable.    Madison Welsh MD  Obstetrics  [facility]

## 2020-10-29 PROCEDURE — 99231 PR SUBSEQUENT HOSPITAL CARE,LEVL I: ICD-10-PCS | Mod: ,,, | Performed by: OBSTETRICS & GYNECOLOGY

## 2020-10-29 PROCEDURE — 11000001 HC ACUTE MED/SURG PRIVATE ROOM

## 2020-10-29 PROCEDURE — 99231 SBSQ HOSP IP/OBS SF/LOW 25: CPT | Mod: ,,, | Performed by: OBSTETRICS & GYNECOLOGY

## 2020-10-29 PROCEDURE — 25000003 PHARM REV CODE 250: Performed by: STUDENT IN AN ORGANIZED HEALTH CARE EDUCATION/TRAINING PROGRAM

## 2020-10-29 RX ADMIN — OXYCODONE HYDROCHLORIDE AND ACETAMINOPHEN 1 TABLET: 10; 325 TABLET ORAL at 04:10

## 2020-10-29 RX ADMIN — MUPIROCIN: 20 OINTMENT TOPICAL at 09:10

## 2020-10-29 RX ADMIN — IBUPROFEN 600 MG: 600 TABLET, FILM COATED ORAL at 04:10

## 2020-10-29 RX ADMIN — IBUPROFEN 600 MG: 600 TABLET, FILM COATED ORAL at 12:10

## 2020-10-29 RX ADMIN — DOCUSATE SODIUM 200 MG: 100 CAPSULE, LIQUID FILLED ORAL at 09:10

## 2020-10-29 RX ADMIN — IBUPROFEN 600 MG: 600 TABLET, FILM COATED ORAL at 06:10

## 2020-10-29 RX ADMIN — OXYCODONE HYDROCHLORIDE AND ACETAMINOPHEN 1 TABLET: 10; 325 TABLET ORAL at 11:10

## 2020-10-29 RX ADMIN — PRENATAL VIT W/ FE FUMARATE-FA TAB 27-0.8 MG 1 TABLET: 27-0.8 TAB at 08:10

## 2020-10-29 RX ADMIN — DOCUSATE SODIUM 200 MG: 100 CAPSULE, LIQUID FILLED ORAL at 12:10

## 2020-10-29 RX ADMIN — OXYCODONE HYDROCHLORIDE AND ACETAMINOPHEN 1 TABLET: 10; 325 TABLET ORAL at 09:10

## 2020-10-29 RX ADMIN — IBUPROFEN 600 MG: 600 TABLET, FILM COATED ORAL at 11:10

## 2020-10-29 RX ADMIN — MUPIROCIN: 20 OINTMENT TOPICAL at 08:10

## 2020-10-29 RX ADMIN — MUPIROCIN: 20 OINTMENT TOPICAL at 12:10

## 2020-10-29 RX ADMIN — DOCUSATE SODIUM 200 MG: 100 CAPSULE, LIQUID FILLED ORAL at 08:10

## 2020-10-29 NOTE — PROGRESS NOTES
[facility]  Obstetrics  Postpartum Progress Note    Patient Name: Yamel Toribio  MRN: [id number]  Admission Date: 10/27/2020  Hospital Length of Stay: 2 days  Attending Physician: Malu Araya MD  Primary Care Provider: Eddie Brito MD    Subjective:     Principal Problem: delivery delivered    Hospital Course:  Patient underwent repeat  without complication due to concerns re fetal wellbeing.  POD 1: Patient reports good pain control, tolerating diet. R/B/A of circumcision discussed, father of the baby is unsure and requests additional time to make a decision.  POD 2: Patient is doing well and has no unusual complaints.  She and her spouse remain uncertain about  circumcision.         She is doing well this morning. She is tolerating a regular diet without nausea or vomiting. She is voiding spontaneously. She is ambulating. She has passed flatus, and has not a BM. Vaginal bleeding is mild. She denies fever or chills. Abdominal pain is mild and controlled with oral medications. She is breastfeeding.     Objective:     Vital Signs (Most Recent):  Temp: 98.3 °F (36.8 °C) (10/29/20 0835)  Pulse: 84 (10/29/20 0835)  Resp: 18 (10/29/20 0835)  BP: 130/70 (10/29/20 0835)  SpO2: 98 % (10/29/20 0835) Vital Signs (24h Range):  Temp:  [98.3 °F (36.8 °C)-98.6 °F (37 °C)] 98.3 °F (36.8 °C)  Pulse:  [] 84  Resp:  [17-18] 18  SpO2:  [98 %] 98 %  BP: (105-130)/(58-71) 130/70     Weight: 90 kg (198 lb 6.6 oz)  Body mass index is 34.06 kg/m².      Intake/Output Summary (Last 24 hours) at 10/29/2020 0934  Last data filed at 10/28/2020 1300  Gross per 24 hour   Intake --   Output 300 ml   Net -300 ml           Significant Labs:  Lab Results   Component Value Date    GROUPTRH B NEG 10/27/2020    HEPBSAG Negative 2020    STREPBCULT No Group B Streptococcus isolated 10/12/2020     Recent Labs   Lab 10/28/20  0309   HGB 8.7*   HCT 27.4*       I have personallly reviewed all  pertinent lab results from the last 24 hours.    Physical Exam:   Constitutional: She is oriented to person, place, and time. She appears well-developed and well-nourished. No distress.       Cardiovascular: Normal rate.     Pulmonary/Chest: Effort normal. No respiratory distress.        Abdominal: Soft. She exhibits abdominal incision. She exhibits no distension. There is no abdominal tenderness. There is no rebound and no guarding.   Incision healing well, no drainage or erythema     Genitourinary:    Uterus normal.             Musculoskeletal: Edema present. No tenderness.       Neurological: She is alert and oriented to person, place, and time.    Skin: Skin is warm and dry.    Psychiatric: She has a normal mood and affect.       Assessment/Plan:     25 y.o. female  for:    *  delivery delivered  Routine post op care, ambulation encouraged.     Previous  section (NRFT/FTP - 18)  TOLAC        Disposition: As patient meets milestones, will plan to discharge tomorrow.    Ibis López MD  Obstetrics  [facility]

## 2020-10-29 NOTE — PLAN OF CARE
Pt to stimulate breast at least eight times in a twenty-four period.   Tylenol for minor pain as directed/Other pain medication as prescribed and directed

## 2020-10-29 NOTE — LACTATION NOTE
Pt denies questions or needing assistance with feeding. LC reinforced expectations for baby's second day of life. Pt aware LC available for assistance with feeding if interested.

## 2020-10-29 NOTE — SUBJECTIVE & OBJECTIVE
She is doing well this morning. She is tolerating a regular diet without nausea or vomiting. She is voiding spontaneously. She is ambulating. She has passed flatus, and has not a BM. Vaginal bleeding is mild. She denies fever or chills. Abdominal pain is mild and controlled with oral medications. She is breastfeeding.     Objective:     Vital Signs (Most Recent):  Temp: 98.3 °F (36.8 °C) (10/29/20 0835)  Pulse: 84 (10/29/20 0835)  Resp: 18 (10/29/20 0835)  BP: 130/70 (10/29/20 0835)  SpO2: 98 % (10/29/20 0835) Vital Signs (24h Range):  Temp:  [98.3 °F (36.8 °C)-98.6 °F (37 °C)] 98.3 °F (36.8 °C)  Pulse:  [] 84  Resp:  [17-18] 18  SpO2:  [98 %] 98 %  BP: (105-130)/(58-71) 130/70     Weight: 90 kg (198 lb 6.6 oz)  Body mass index is 34.06 kg/m².      Intake/Output Summary (Last 24 hours) at 10/29/2020 0934  Last data filed at 10/28/2020 1300  Gross per 24 hour   Intake --   Output 300 ml   Net -300 ml           Significant Labs:  Lab Results   Component Value Date    GROUPTRH B NEG 10/27/2020    HEPBSAG Negative 05/13/2020    STREPBCULT No Group B Streptococcus isolated 10/12/2020     Recent Labs   Lab 10/28/20  0309   HGB 8.7*   HCT 27.4*       I have personallly reviewed all pertinent lab results from the last 24 hours.    Physical Exam:   Constitutional: She is oriented to person, place, and time. She appears well-developed and well-nourished. No distress.       Cardiovascular: Normal rate.     Pulmonary/Chest: Effort normal. No respiratory distress.        Abdominal: Soft. She exhibits abdominal incision. She exhibits no distension. There is no abdominal tenderness. There is no rebound and no guarding.   Incision healing well, no drainage or erythema     Genitourinary:    Uterus normal.             Musculoskeletal: Edema present. No tenderness.       Neurological: She is alert and oriented to person, place, and time.    Skin: Skin is warm and dry.    Psychiatric: She has a normal mood and affect.

## 2020-10-30 VITALS
OXYGEN SATURATION: 97 % | WEIGHT: 198.44 LBS | RESPIRATION RATE: 18 BRPM | TEMPERATURE: 98 F | BODY MASS INDEX: 33.88 KG/M2 | DIASTOLIC BLOOD PRESSURE: 71 MMHG | HEART RATE: 108 BPM | SYSTOLIC BLOOD PRESSURE: 127 MMHG | HEIGHT: 64 IN

## 2020-10-30 PROCEDURE — 99238 PR HOSPITAL DISCHARGE DAY,<30 MIN: ICD-10-PCS | Mod: ,,, | Performed by: OBSTETRICS & GYNECOLOGY

## 2020-10-30 PROCEDURE — 99238 HOSP IP/OBS DSCHRG MGMT 30/<: CPT | Mod: ,,, | Performed by: OBSTETRICS & GYNECOLOGY

## 2020-10-30 PROCEDURE — 25000003 PHARM REV CODE 250: Performed by: STUDENT IN AN ORGANIZED HEALTH CARE EDUCATION/TRAINING PROGRAM

## 2020-10-30 RX ADMIN — IBUPROFEN 600 MG: 600 TABLET, FILM COATED ORAL at 12:10

## 2020-10-30 RX ADMIN — IBUPROFEN 600 MG: 600 TABLET, FILM COATED ORAL at 06:10

## 2020-10-30 NOTE — PLAN OF CARE
Pt ambulating, voiding and passing flatus. Pt tolerating PO well and no SS of distress at this time.  Pain well controlled well throughout shift by oral pain medication. Bleeding has been light throughout shift and fundus is firm. Mother baby care guide reviewed. All questions answered. Medications delivered to bedside. Reviewed medication list, when to call provider, and SS of infection. Pt stable at this time. ID band verified.  Patient verbalized understanding to follow up with OB in two weeks ans in six weeks. Awaiting transport.

## 2020-10-30 NOTE — PROGRESS NOTES
[facility]  Obstetrics  Postpartum Progress Note    Patient Name: Yamel Toribio  MRN: [id number]  Admission Date: 10/27/2020  Hospital Length of Stay: 3 days  Attending Physician: Malu Araya MD  Primary Care Provider: Eddie Brito MD    Subjective:     Principal Problem: delivery delivered    Hospital Course:  Patient underwent repeat  without complication due to concerns re fetal wellbeing.  POD 1: Patient reports good pain control, tolerating diet. R/B/A of circumcision discussed, father of the baby is unsure and requests additional time to make a decision.  POD 2: Patient is doing well and has no unusual complaints.  She and her spouse remain uncertain about  circumcision.   POD 3: Uncomplicated postoperative course. Ready for discharge today. Declines circumcision.    Interval History:     She is doing well this morning. She is tolerating a regular diet without nausea or vomiting. She is voiding spontaneously. She is ambulating. She has passed flatus, and has not a BM. Vaginal bleeding is mild. She denies fever or chills. Abdominal pain is mild and controlled with oral medications. She is breastfeeding. She declines circumcision for her male baby.    Objective:     Vital Signs (Most Recent):  Temp: 98.3 °F (36.8 °C) (10/30/20 0738)  Pulse: 108 (10/30/20 0738)  Resp: 18 (10/30/20 0738)  BP: 127/71 (10/30/20 0738)  SpO2: 97 % (10/30/20 0738) Vital Signs (24h Range):  Temp:  [98.1 °F (36.7 °C)-98.3 °F (36.8 °C)] 98.3 °F (36.8 °C)  Pulse:  [] 108  Resp:  [15-18] 18  SpO2:  [97 %-99 %] 97 %  BP: (115-127)/(59-71) 127/71     Weight: 90 kg (198 lb 6.6 oz)  Body mass index is 34.06 kg/m².    No intake or output data in the 24 hours ending 10/30/20 1100        Significant Labs:  Lab Results   Component Value Date    GROUPTRH B NEG 10/27/2020    HEPBSAG Negative 2020    STREPBCULT No Group B Streptococcus isolated 10/12/2020     No results for input(s):  HGB, HCT in the last 48 hours.    CBC: No results for input(s): WBC, RBC, HGB, HCT, PLT, MCV, MCH, MCHC in the last 48 hours.  I have personallly reviewed all pertinent lab results from the last 24 hours.    Physical Exam:   Constitutional: She is oriented to person, place, and time. She appears well-developed and well-nourished.    HENT:   Head: Normocephalic and atraumatic.      Cardiovascular: Normal rate, regular rhythm and normal heart sounds.  Exam reveals no edema.     Pulmonary/Chest: Effort normal.        Abdominal: Soft. She exhibits abdominal incision. She exhibits no distension and no mass. There is no abdominal tenderness. There is no rebound and no guarding.     Genitourinary:    Uterus normal.      Genitourinary Comments: Firm fundus at the umbilicus             Musculoskeletal: No edema.       Neurological: She is alert and oriented to person, place, and time.    Skin: Skin is warm and dry.    Psychiatric: She has a normal mood and affect. Her behavior is normal. Judgment and thought content normal.       Assessment/Plan:     25 y.o. female  for:    *  delivery delivered  Routine post op care, ambulation encouraged.     Previous  section (NRFT/FTP - 18)  TOLAC        Disposition: As patient meets milestones, will plan to discharge today.    Edilia Chahal MD  Obstetrics  [facility]

## 2020-10-30 NOTE — SUBJECTIVE & OBJECTIVE
Interval History:     She is doing well this morning. She is tolerating a regular diet without nausea or vomiting. She is voiding spontaneously. She is ambulating. She has passed flatus, and has not a BM. Vaginal bleeding is mild. She denies fever or chills. Abdominal pain is mild and controlled with oral medications. She is breastfeeding. She declines circumcision for her male baby.    Objective:     Vital Signs (Most Recent):  Temp: 98.3 °F (36.8 °C) (10/30/20 0738)  Pulse: 108 (10/30/20 0738)  Resp: 18 (10/30/20 0738)  BP: 127/71 (10/30/20 0738)  SpO2: 97 % (10/30/20 0738) Vital Signs (24h Range):  Temp:  [98.1 °F (36.7 °C)-98.3 °F (36.8 °C)] 98.3 °F (36.8 °C)  Pulse:  [] 108  Resp:  [15-18] 18  SpO2:  [97 %-99 %] 97 %  BP: (115-127)/(59-71) 127/71     Weight: 90 kg (198 lb 6.6 oz)  Body mass index is 34.06 kg/m².    No intake or output data in the 24 hours ending 10/30/20 1100        Significant Labs:  Lab Results   Component Value Date    GROUPTRH B NEG 10/27/2020    HEPBSAG Negative 05/13/2020    STREPBCULT No Group B Streptococcus isolated 10/12/2020     No results for input(s): HGB, HCT in the last 48 hours.    CBC: No results for input(s): WBC, RBC, HGB, HCT, PLT, MCV, MCH, MCHC in the last 48 hours.  I have personallly reviewed all pertinent lab results from the last 24 hours.    Physical Exam:   Constitutional: She is oriented to person, place, and time. She appears well-developed and well-nourished.    HENT:   Head: Normocephalic and atraumatic.      Cardiovascular: Normal rate, regular rhythm and normal heart sounds.  Exam reveals no edema.     Pulmonary/Chest: Effort normal.        Abdominal: Soft. She exhibits abdominal incision. She exhibits no distension and no mass. There is no abdominal tenderness. There is no rebound and no guarding.     Genitourinary:    Uterus normal.      Genitourinary Comments: Firm fundus at the umbilicus             Musculoskeletal: No edema.       Neurological: She  is alert and oriented to person, place, and time.    Skin: Skin is warm and dry.    Psychiatric: She has a normal mood and affect. Her behavior is normal. Judgment and thought content normal.

## 2020-10-30 NOTE — DISCHARGE SUMMARY
[facility]  Obstetrics  Discharge Summary      Patient Name: Yamel Toribio  MRN: [id number]  Admission Date: 10/27/2020  Hospital Length of Stay: 3 days  Discharge Date and Time:  10/30/2020 11:03 AM  Attending Physician: Malu Araya MD   Discharging Provider: Edilia Chahal MD   Primary Care Provider: Eddie Brito MD    HPI: 26 yo  presents at 38 weeks for TOLAC in early labor.        Procedure(s) (LRB):   SECTION (N/A)     Hospital Course:   Patient underwent repeat  without complication due to concerns re fetal wellbeing.  POD 1: Patient reports good pain control, tolerating diet. R/B/A of circumcision discussed, father of the baby is unsure and requests additional time to make a decision.  POD 2: Patient is doing well and has no unusual complaints.  She and her spouse remain uncertain about  circumcision.   POD 3: Uncomplicated postoperative course. Ready for discharge today. Declines circumcision.     Consults (From admission, onward)        Status Ordering Provider     Inpatient consult to Anesthesiology  Once     Provider:  (Not yet assigned)    Acknowledged HAILEE LARA          Final Active Diagnoses:    Diagnosis Date Noted POA    PRINCIPAL PROBLEM:   delivery delivered [O82] 10/28/2020 No    Normal labor [O80, Z37.9] 10/27/2020 Not Applicable    Previous  section (NRFT/FTP - 18) [Z98.891] 2019 Not Applicable     Chronic      Problems Resolved During this Admission:        Significant Diagnostic Studies: Labs: CBC No results for input(s): WBC, HGB, HCT, PLT in the last 48 hours.      Feeding Method: breast    Immunizations     Date Immunization Status Dose Route/Site Given by    10/27/20 1657 MMR Incomplete 0.5 mL Subcutaneous/     10/27/20 1657 Tdap Incomplete 0.5 mL Intramuscular/     10/27/20 1557 Rho (D) Immune Globulin - IM Incomplete 300 mcg Intramuscular/           Delivery:    Episiotomy: None    Lacerations: None   Repair suture:     Repair # of packets:     Blood loss (ml): 0     Birth information:  YOB: 2020   Time of birth: 2:33 PM   Sex: male   Delivery type: , Low Transverse   Gestational Age: 38w2d    Delivery Clinician:      Other providers:       Additional  information:  Forceps:    Vacuum:    Breech:    Observed anomalies      Living?:           APGARS  One minute Five minutes Ten minutes   Skin color:         Heart rate:         Grimace:         Muscle tone:         Breathing:         Totals: 9  9        Placenta: Delivered:       appearance    Pending Diagnostic Studies:     None          Discharged Condition: good    Disposition: Home or Self Care    Follow Up:  Follow-up Information     Malu Araya MD In 2 weeks.    Specialty: Obstetrics and Gynecology  Why: For wound re-check  Contact information:  [address]  [address]  [place] [id number] [phone]             Malu Araya MD In 6 weeks.    Specialty: Obstetrics and Gynecology  Why: Postpartum  Contact information:  [address]  [address]  [facility] [address] [phone]                 Patient Instructions:      Diet Adult Regular     Lifting restrictions   Order Comments: Nothing greater than 25lbs for 1 month     No driving until:   Order Comments: Not needing narcotics     Notify your health care provider if you experience any of the following:  temperature >100.4     Notify your health care provider if you experience any of the following:  persistent nausea and vomiting or diarrhea     Notify your health care provider if you experience any of the following:  severe uncontrolled pain     Notify your health care provider if you experience any of the following:  redness, tenderness, or signs of infection (pain, swelling, redness, odor or green/yellow discharge around incision site)     Notify your health care provider if you experience any of the following:  difficulty  breathing or increased cough     Notify your health care provider if you experience any of the following:  severe persistent headache     Notify your health care provider if you experience any of the following:  worsening rash     Notify your health care provider if you experience any of the following:  persistent dizziness, light-headedness, or visual disturbances     Notify your health care provider if you experience any of the following:  increased confusion or weakness     No dressing needed     Activity as tolerated     Medications:  Current Discharge Medication List      START taking these medications    Details   docusate sodium (COLACE) 100 MG capsule Take 2 capsules (200 mg total) by mouth 2 (two) times daily.  Qty: 30 capsule, Refills: 0      ibuprofen (ADVIL,MOTRIN) 600 MG tablet Take 1 tablet (600 mg total) by mouth every 6 (six) hours.  Qty: 40 tablet, Refills: 0      oxyCODONE-acetaminophen (PERCOCET) 5-325 mg per tablet Take 1 tablet by mouth every 4 (four) hours as needed.  Qty: 20 tablet, Refills: 0    Comments: Quantity prescribed more than 7 day supply? No         CONTINUE these medications which have NOT CHANGED    Details   FERRALET 90 DUAL-IRON DELIVERY 90-1-12-50 mg-mg-mcg-mg Tab TK 1 T PO QD  Qty: 30 tablet, Refills: 11    Associated Diagnoses: 10 weeks gestation of pregnancy         STOP taking these medications       aspirin (ASPIR-81 ORAL) Comments:   Reason for Stopping:         nystatin (MYCOSTATIN) cream Comments:   Reason for Stopping:               Edilia Chahal MD  Obstetrics  [facility]

## 2020-11-09 ENCOUNTER — POSTPARTUM VISIT (OUTPATIENT)
Dept: OBSTETRICS AND GYNECOLOGY | Facility: CLINIC | Age: 25
End: 2020-11-09
Attending: STUDENT IN AN ORGANIZED HEALTH CARE EDUCATION/TRAINING PROGRAM
Payer: MEDICAID

## 2020-11-09 ENCOUNTER — TELEPHONE (OUTPATIENT)
Dept: OBSTETRICS AND GYNECOLOGY | Facility: CLINIC | Age: 25
End: 2020-11-09

## 2020-11-09 VITALS
HEIGHT: 64 IN | SYSTOLIC BLOOD PRESSURE: 116 MMHG | BODY MASS INDEX: 31.15 KG/M2 | DIASTOLIC BLOOD PRESSURE: 66 MMHG | WEIGHT: 182.44 LBS

## 2020-11-09 PROCEDURE — 0503F PR POSTPARTUM CARE VISIT: ICD-10-PCS | Mod: ,,, | Performed by: STUDENT IN AN ORGANIZED HEALTH CARE EDUCATION/TRAINING PROGRAM

## 2020-11-09 PROCEDURE — 0503F POSTPARTUM CARE VISIT: CPT | Mod: ,,, | Performed by: STUDENT IN AN ORGANIZED HEALTH CARE EDUCATION/TRAINING PROGRAM

## 2020-11-09 PROCEDURE — 99999 PR PBB SHADOW E&M-EST. PATIENT-LVL III: ICD-10-PCS | Mod: PBBFAC,,, | Performed by: STUDENT IN AN ORGANIZED HEALTH CARE EDUCATION/TRAINING PROGRAM

## 2020-11-09 PROCEDURE — 99213 OFFICE O/P EST LOW 20 MIN: CPT | Mod: PBBFAC,PN | Performed by: STUDENT IN AN ORGANIZED HEALTH CARE EDUCATION/TRAINING PROGRAM

## 2020-11-09 PROCEDURE — 99999 PR PBB SHADOW E&M-EST. PATIENT-LVL III: CPT | Mod: PBBFAC,,, | Performed by: STUDENT IN AN ORGANIZED HEALTH CARE EDUCATION/TRAINING PROGRAM

## 2020-11-09 NOTE — PROGRESS NOTES
Subjective:      Yamel Toribio is a 25 y.o.  who presents for a postpartum visit.  She is status post  repeat CD after failed TOLAC 2 weeks ago.  Her hospitalization was not complicated.  She is breastfeeding.  She desires IUD for contraception.  She reports mood is stable.      Past Medical History:   Diagnosis Date    Anemia     Iron deficiency    Asthma        Current Outpatient Medications:     docusate sodium (COLACE) 100 MG capsule, Take 2 capsules (200 mg total) by mouth 2 (two) times daily. (Patient not taking: Reported on 2020), Disp: 30 capsule, Rfl: 0    FERRALET 90 DUAL-IRON DELIVERY 90-1-12-50 mg-mg-mcg-mg Tab, TK 1 T PO QD (Patient not taking: Reported on 2020), Disp: 30 tablet, Rfl: 11    ibuprofen (ADVIL,MOTRIN) 600 MG tablet, Take 1 tablet (600 mg total) by mouth every 6 (six) hours. (Patient not taking: Reported on 2020), Disp: 40 tablet, Rfl: 0    oxyCODONE-acetaminophen (PERCOCET) 5-325 mg per tablet, Take 1 tablet by mouth every 4 (four) hours as needed. (Patient not taking: Reported on 2020), Disp: 20 tablet, Rfl: 0      Objective:     Vitals:    20 0847   BP: 116/66       APPEARANCE: Well nourished, well developed, in no acute distress.  PSYCH: Appropriate mood and affect.  NECK:  Supple, no thyromegaly.  BREASTS:  No masses, no nipple discharge.  CARDIOVASCULAR:  Regular rate and rhythm.  LUNGS:  Clear to auscultation bilaterally.  ABDOMEN:  Soft, nontender.  Pfannenstiel incision C/D/I, healing well.    GENITOURINARY:  Deferred.  EXTREMITIES: No edema.      Assessment:     Postpartum care and examination    Postop check        Plan:     1. Postpartum course reviewed and discussed with patient.  All questions answered.  Return to clinic in 4 weeks for postpartum visit and IUD insertion.

## 2020-11-09 NOTE — TELEPHONE ENCOUNTER
----- Message from Malu Araya MD sent at 11/9/2020  8:54 AM CST -----  Can we set her up for paragard IUD insertion at 6-8 weeks postpartum?  The IUD has already been ordered.  Thanks so much!

## 2020-12-09 ENCOUNTER — TELEPHONE (OUTPATIENT)
Dept: OBSTETRICS AND GYNECOLOGY | Facility: CLINIC | Age: 25
End: 2020-12-09

## 2020-12-10 ENCOUNTER — PATIENT MESSAGE (OUTPATIENT)
Dept: OBSTETRICS AND GYNECOLOGY | Facility: CLINIC | Age: 25
End: 2020-12-10

## 2020-12-10 ENCOUNTER — LAB VISIT (OUTPATIENT)
Dept: LAB | Facility: OTHER | Age: 25
End: 2020-12-10
Attending: OBSTETRICS & GYNECOLOGY
Payer: MEDICAID

## 2020-12-10 ENCOUNTER — POSTPARTUM VISIT (OUTPATIENT)
Dept: OBSTETRICS AND GYNECOLOGY | Facility: CLINIC | Age: 25
End: 2020-12-10
Attending: STUDENT IN AN ORGANIZED HEALTH CARE EDUCATION/TRAINING PROGRAM
Payer: MEDICAID

## 2020-12-10 VITALS — BODY MASS INDEX: 33.34 KG/M2 | HEIGHT: 64 IN | WEIGHT: 195.31 LBS

## 2020-12-10 DIAGNOSIS — D50.8 IRON DEFICIENCY ANEMIA SECONDARY TO INADEQUATE DIETARY IRON INTAKE: ICD-10-CM

## 2020-12-10 DIAGNOSIS — Z12.4 PAP SMEAR FOR CERVICAL CANCER SCREENING: ICD-10-CM

## 2020-12-10 DIAGNOSIS — Z30.430 ENCOUNTER FOR INSERTION OF COPPER IUD: ICD-10-CM

## 2020-12-10 LAB
B-HCG UR QL: NEGATIVE
BASOPHILS # BLD AUTO: 0.03 K/UL (ref 0–0.2)
BASOPHILS NFR BLD: 0.4 % (ref 0–1.9)
CTP QC/QA: YES
DIFFERENTIAL METHOD: ABNORMAL
EOSINOPHIL # BLD AUTO: 0.1 K/UL (ref 0–0.5)
EOSINOPHIL NFR BLD: 1.2 % (ref 0–8)
ERYTHROCYTE [DISTWIDTH] IN BLOOD BY AUTOMATED COUNT: 14.3 % (ref 11.5–14.5)
HCT VFR BLD AUTO: 32.8 % (ref 37–48.5)
HGB BLD-MCNC: 9.8 G/DL (ref 12–16)
IMM GRANULOCYTES # BLD AUTO: 0.02 K/UL (ref 0–0.04)
IMM GRANULOCYTES NFR BLD AUTO: 0.3 % (ref 0–0.5)
LYMPHOCYTES # BLD AUTO: 2.8 K/UL (ref 1–4.8)
LYMPHOCYTES NFR BLD: 36.1 % (ref 18–48)
MCH RBC QN AUTO: 25.1 PG (ref 27–31)
MCHC RBC AUTO-ENTMCNC: 29.9 G/DL (ref 32–36)
MCV RBC AUTO: 84 FL (ref 82–98)
MONOCYTES # BLD AUTO: 0.6 K/UL (ref 0.3–1)
MONOCYTES NFR BLD: 8.1 % (ref 4–15)
NEUTROPHILS # BLD AUTO: 4.2 K/UL (ref 1.8–7.7)
NEUTROPHILS NFR BLD: 53.9 % (ref 38–73)
NRBC BLD-RTO: 0 /100 WBC
PLATELET # BLD AUTO: 275 K/UL (ref 150–350)
PMV BLD AUTO: 10.4 FL (ref 9.2–12.9)
RBC # BLD AUTO: 3.91 M/UL (ref 4–5.4)
WBC # BLD AUTO: 7.79 K/UL (ref 3.9–12.7)

## 2020-12-10 PROCEDURE — 99999 PR PBB SHADOW E&M-EST. PATIENT-LVL III: ICD-10-PCS | Mod: PBBFAC,,, | Performed by: OBSTETRICS & GYNECOLOGY

## 2020-12-10 PROCEDURE — 88175 CYTOPATH C/V AUTO FLUID REDO: CPT

## 2020-12-10 PROCEDURE — 59430 PR CARE AFTER DELIVERY ONLY: ICD-10-PCS | Mod: S$PBB,,, | Performed by: OBSTETRICS & GYNECOLOGY

## 2020-12-10 PROCEDURE — 58300 INSERT INTRAUTERINE DEVICE: CPT | Mod: PBBFAC | Performed by: OBSTETRICS & GYNECOLOGY

## 2020-12-10 PROCEDURE — 36415 COLL VENOUS BLD VENIPUNCTURE: CPT

## 2020-12-10 PROCEDURE — 99999 PR PBB SHADOW E&M-EST. PATIENT-LVL III: CPT | Mod: PBBFAC,,, | Performed by: OBSTETRICS & GYNECOLOGY

## 2020-12-10 PROCEDURE — 99213 OFFICE O/P EST LOW 20 MIN: CPT | Mod: PBBFAC | Performed by: OBSTETRICS & GYNECOLOGY

## 2020-12-10 PROCEDURE — 85025 COMPLETE CBC W/AUTO DIFF WBC: CPT

## 2020-12-10 PROCEDURE — 58300 INSERTION OF IUD: ICD-10-PCS | Mod: S$PBB,,, | Performed by: OBSTETRICS & GYNECOLOGY

## 2020-12-10 RX ADMIN — COPPER 380 MM: 313.4 INTRAUTERINE DEVICE INTRAUTERINE at 02:12

## 2020-12-17 NOTE — PROGRESS NOTES
Subjective:      Yamel Toribio is a 25 y.o.  who presents for a postpartum visit.  She is status post  uncomplicated vaginal delivery 7 weeks ago.  Her hospitalization was not complicated.  She is breastfeeding without difficult.  She desires IUD for contraception.  She reports mood is good without depression or anxiety.      Denies vaginal bleeding, pain, and abnormal discharge. No bladder or bowel problems.      Her last pap was normal 2018    Past Medical History:   Diagnosis Date    Anemia     Iron deficiency    Asthma        Current Outpatient Medications:     docusate sodium (COLACE) 100 MG capsule, Take 2 capsules (200 mg total) by mouth 2 (two) times daily. (Patient not taking: Reported on 2020), Disp: 30 capsule, Rfl: 0    FERRALET 90 DUAL-IRON DELIVERY 90-1-12-50 mg-mg-mcg-mg Tab, TK 1 T PO QD (Patient not taking: Reported on 2020), Disp: 30 tablet, Rfl: 11    ibuprofen (ADVIL,MOTRIN) 600 MG tablet, Take 1 tablet (600 mg total) by mouth every 6 (six) hours. (Patient not taking: Reported on 2020), Disp: 40 tablet, Rfl: 0    oxyCODONE-acetaminophen (PERCOCET) 5-325 mg per tablet, Take 1 tablet by mouth every 4 (four) hours as needed. (Patient not taking: Reported on 2020), Disp: 20 tablet, Rfl: 0      Objective:     Wt Readings from Last 3 Encounters:   12/10/20 88.6 kg (195 lb 5.2 oz)   20 82.7 kg (182 lb 6.9 oz)   10/27/20 90 kg (198 lb 6.6 oz)     Temp Readings from Last 3 Encounters:   10/30/20 98.3 °F (36.8 °C) (Oral)   09/10/20 98.1 °F (36.7 °C) (Oral)   20 98.3 °F (36.8 °C) (Oral)     BP Readings from Last 3 Encounters:   20 116/66   10/30/20 127/71   10/26/20 102/72     Pulse Readings from Last 3 Encounters:   10/30/20 108   09/10/20 78   20 70     APPEARANCE: Well nourished, well developed, in no acute distress.  PSYCH: Appropriate mood and affect.  NECK:  Supple, no thyromegaly.  BREASTS:  No masses, no nipple discharge.  CARDIOVASCULAR:   Regular rate and rhythm.  LUNGS:  Clear to auscultation bilaterally.  ABDOMEN:  Soft, nontender.  GENITOURINARY:  External genitalia normal in appearance, normal perineal body, normal urethral meatus.  Vagina with scant discharge, no blood.  No lesions.  Cervix without lesion, C/T/H. Pap smear obtained. IUD placed- see separate procedure note. Uterus mobile, nontender, normal in size.  Adnexa without masses or TTP.    EXTREMITIES: No edema.      Assessment:     Postpartum examination following vaginal delivery  Doing well and healed from delivery. Pap smear done. Paragard placed today. CBC ordered to recheck H&H due to iron deficiency.    Pap smear for cervical cancer screening  -     Liquid-Based Pap Smear, Screening    Encounter for insertion of copper IUD  -     POCT urine pregnancy: negative  See procedure note for details.    Iron deficiency anemia secondary to inadequate dietary iron intake  -     CBC Auto Differential; Future; Expected date: 12/10/2020    RTC 1 year    Plan:     1. Postpartum course reviewed and discussed with patient.  All questions answered.  Return to clinic in 1 year for annual exam      Adriana Charles MD

## 2020-12-17 NOTE — PROCEDURES
Insertion of IUD    Date/Time: 12/10/2020 2:00 PM  Performed by: Adriana Charles MD  Authorized by: Adriana Charles MD     Consent:     Consent obtained:  Written    Consent given by:  Patient    Procedure risks and benefits discussed: yes      Patient questions answered: yes      Patient agrees, verbalizes understanding, and wants to proceed: yes      Educational handouts given: yes      Instructions and paperwork completed: yes    Procedure:     Pelvic exam performed: yes      Negative GC/chlamydia test: yes      Negative urine pregnancy test: yes      Cervix cleaned and prepped: yes      Speculum placed in vagina: yes      Tenaculum applied to cervix: yes      Uterus sounded: yes      Uterus sound depth (cm):  8    IUD inserted with no complications: yes      IUD type:  ParaGard    Strings trimmed: yes (3cm outside of cervix)    Post-procedure:     Patient tolerated procedure well: yes      Patient will follow up after next period: yes    Comments:      Results for orders placed or performed in visit on 12/10/20  -POCT urine pregnancy       Result                      Value             Ref Range           POC Preg Test, Ur           Negative          Negative             Accept*     Yes                                Pap smear done today.      Adriana Charles MD

## 2020-12-20 LAB
FINAL PATHOLOGIC DIAGNOSIS: NORMAL
Lab: NORMAL

## 2020-12-23 ENCOUNTER — TELEPHONE (OUTPATIENT)
Dept: OBSTETRICS AND GYNECOLOGY | Facility: CLINIC | Age: 25
End: 2020-12-23

## 2021-01-13 ENCOUNTER — LAB VISIT (OUTPATIENT)
Dept: LAB | Facility: HOSPITAL | Age: 26
End: 2021-01-13
Attending: OBSTETRICS & GYNECOLOGY
Payer: MEDICAID

## 2021-01-13 ENCOUNTER — OFFICE VISIT (OUTPATIENT)
Dept: OBSTETRICS AND GYNECOLOGY | Facility: CLINIC | Age: 26
End: 2021-01-13
Payer: MEDICAID

## 2021-01-13 VITALS
HEIGHT: 64 IN | WEIGHT: 195.56 LBS | SYSTOLIC BLOOD PRESSURE: 120 MMHG | BODY MASS INDEX: 33.38 KG/M2 | DIASTOLIC BLOOD PRESSURE: 78 MMHG

## 2021-01-13 DIAGNOSIS — N93.9 ABNORMAL UTERINE BLEEDING (AUB): ICD-10-CM

## 2021-01-13 DIAGNOSIS — D64.9 ANEMIA, UNSPECIFIED TYPE: ICD-10-CM

## 2021-01-13 DIAGNOSIS — Z30.431 SURVEILLANCE OF INTRAUTERINE CONTRACEPTION: Primary | ICD-10-CM

## 2021-01-13 LAB
BASOPHILS # BLD AUTO: 0.03 K/UL (ref 0–0.2)
BASOPHILS NFR BLD: 0.5 % (ref 0–1.9)
DIFFERENTIAL METHOD: ABNORMAL
EOSINOPHIL # BLD AUTO: 0.1 K/UL (ref 0–0.5)
EOSINOPHIL NFR BLD: 1.7 % (ref 0–8)
ERYTHROCYTE [DISTWIDTH] IN BLOOD BY AUTOMATED COUNT: 14.8 % (ref 11.5–14.5)
HCT VFR BLD AUTO: 32.6 % (ref 37–48.5)
HGB BLD-MCNC: 9.5 G/DL (ref 12–16)
IMM GRANULOCYTES # BLD AUTO: 0.01 K/UL (ref 0–0.04)
IMM GRANULOCYTES NFR BLD AUTO: 0.2 % (ref 0–0.5)
LYMPHOCYTES # BLD AUTO: 2.9 K/UL (ref 1–4.8)
LYMPHOCYTES NFR BLD: 48.2 % (ref 18–48)
MCH RBC QN AUTO: 24.9 PG (ref 27–31)
MCHC RBC AUTO-ENTMCNC: 29.1 G/DL (ref 32–36)
MCV RBC AUTO: 86 FL (ref 82–98)
MONOCYTES # BLD AUTO: 0.6 K/UL (ref 0.3–1)
MONOCYTES NFR BLD: 9.2 % (ref 4–15)
NEUTROPHILS # BLD AUTO: 2.4 K/UL (ref 1.8–7.7)
NEUTROPHILS NFR BLD: 40.2 % (ref 38–73)
NRBC BLD-RTO: 0 /100 WBC
PLATELET # BLD AUTO: 271 K/UL (ref 150–350)
PMV BLD AUTO: 11.7 FL (ref 9.2–12.9)
RBC # BLD AUTO: 3.81 M/UL (ref 4–5.4)
WBC # BLD AUTO: 5.95 K/UL (ref 3.9–12.7)

## 2021-01-13 PROCEDURE — 85025 COMPLETE CBC W/AUTO DIFF WBC: CPT

## 2021-01-13 PROCEDURE — 99999 PR PBB SHADOW E&M-EST. PATIENT-LVL III: CPT | Mod: PBBFAC,,, | Performed by: OBSTETRICS & GYNECOLOGY

## 2021-01-13 PROCEDURE — 99213 OFFICE O/P EST LOW 20 MIN: CPT | Mod: PBBFAC,PN | Performed by: OBSTETRICS & GYNECOLOGY

## 2021-01-13 PROCEDURE — 99213 PR OFFICE/OUTPT VISIT, EST, LEVL III, 20-29 MIN: ICD-10-PCS | Mod: S$PBB,,, | Performed by: OBSTETRICS & GYNECOLOGY

## 2021-01-13 PROCEDURE — 99999 PR PBB SHADOW E&M-EST. PATIENT-LVL III: ICD-10-PCS | Mod: PBBFAC,,, | Performed by: OBSTETRICS & GYNECOLOGY

## 2021-01-13 PROCEDURE — 99213 OFFICE O/P EST LOW 20 MIN: CPT | Mod: S$PBB,,, | Performed by: OBSTETRICS & GYNECOLOGY

## 2021-01-13 RX ORDER — NORGESTIMATE AND ETHINYL ESTRADIOL 0.25-0.035
1 KIT ORAL DAILY
Qty: 30 TABLET | Refills: 3 | Status: SHIPPED | OUTPATIENT
Start: 2021-01-13 | End: 2022-06-13

## 2021-02-01 PROBLEM — Z37.9 NORMAL LABOR: Status: RESOLVED | Noted: 2020-10-27 | Resolved: 2021-02-01

## 2021-04-16 ENCOUNTER — PATIENT MESSAGE (OUTPATIENT)
Dept: RESEARCH | Facility: HOSPITAL | Age: 26
End: 2021-04-16

## 2022-04-20 PROBLEM — K52.9 GASTROENTERITIS: Status: ACTIVE | Noted: 2022-04-20

## 2022-04-20 PROBLEM — N39.0 URINARY TRACT INFECTION WITHOUT HEMATURIA: Status: ACTIVE | Noted: 2022-04-20

## 2022-04-20 PROBLEM — R00.0 TACHYCARDIA: Status: ACTIVE | Noted: 2022-04-20

## 2022-05-11 DIAGNOSIS — N76.0 ACUTE VAGINITIS: Primary | ICD-10-CM

## 2022-05-11 RX ORDER — FLUCONAZOLE 150 MG/1
150 TABLET ORAL DAILY
Qty: 2 TABLET | Refills: 0 | Status: SHIPPED | OUTPATIENT
Start: 2022-05-11 | End: 2022-05-13

## 2022-05-11 NOTE — TELEPHONE ENCOUNTER
Last seen 1/13/21 for IUD check.      Pt had to go to the ER a week ago.  Had a CT scan and was told her bladder was enlarged.  Neg UTI.  Pt has the paragard IUD in place.  Reports that every now and then with and without intercourse, she would have sharp pains.  Requesting to see Dr. Charles to discuss.  Also reports thick white d/c, vaginal itching and irritation.  Requesting Rx.    Scheduled pt at next available with Dr. Charles on June 13.    Scheduled pt at the [facility] for swabs if Rx does not improve symptoms.    Diflucan pended

## 2022-06-13 ENCOUNTER — OFFICE VISIT (OUTPATIENT)
Dept: OBSTETRICS AND GYNECOLOGY | Facility: CLINIC | Age: 27
End: 2022-06-13
Payer: COMMERCIAL

## 2022-06-13 VITALS
SYSTOLIC BLOOD PRESSURE: 118 MMHG | HEIGHT: 65 IN | WEIGHT: 193.13 LBS | DIASTOLIC BLOOD PRESSURE: 58 MMHG | BODY MASS INDEX: 32.18 KG/M2

## 2022-06-13 DIAGNOSIS — N76.0 ACUTE VAGINITIS: Primary | ICD-10-CM

## 2022-06-13 DIAGNOSIS — R82.90 URINE ABNORMALITY: ICD-10-CM

## 2022-06-13 DIAGNOSIS — Z30.431 SURVEILLANCE OF INTRAUTERINE CONTRACEPTION: ICD-10-CM

## 2022-06-13 LAB
BILIRUB SERPL-MCNC: NEGATIVE MG/DL
BLOOD URINE, POC: ABNORMAL
CLARITY, POC UA: ABNORMAL
COLOR, POC UA: ABNORMAL
GLUCOSE UR QL STRIP: NORMAL
KETONES UR QL STRIP: NEGATIVE
LEUKOCYTE ESTERASE URINE, POC: NEGATIVE
NITRITE, POC UA: NEGATIVE
PH, POC UA: 5
PROTEIN, POC: NEGATIVE
SPECIFIC GRAVITY, POC UA: 1.01
UROBILINOGEN, POC UA: NORMAL

## 2022-06-13 PROCEDURE — 99212 OFFICE O/P EST SF 10 MIN: CPT | Mod: PBBFAC | Performed by: OBSTETRICS & GYNECOLOGY

## 2022-06-13 PROCEDURE — 3008F BODY MASS INDEX DOCD: CPT | Mod: CPTII,S$GLB,, | Performed by: OBSTETRICS & GYNECOLOGY

## 2022-06-13 PROCEDURE — 99214 OFFICE O/P EST MOD 30 MIN: CPT | Mod: S$GLB,,, | Performed by: OBSTETRICS & GYNECOLOGY

## 2022-06-13 PROCEDURE — 1160F PR REVIEW ALL MEDS BY PRESCRIBER/CLIN PHARMACIST DOCUMENTED: ICD-10-PCS | Mod: CPTII,S$GLB,, | Performed by: OBSTETRICS & GYNECOLOGY

## 2022-06-13 PROCEDURE — 87801 DETECT AGNT MULT DNA AMPLI: CPT | Performed by: OBSTETRICS & GYNECOLOGY

## 2022-06-13 PROCEDURE — 3078F DIAST BP <80 MM HG: CPT | Mod: CPTII,S$GLB,, | Performed by: OBSTETRICS & GYNECOLOGY

## 2022-06-13 PROCEDURE — 87491 CHLMYD TRACH DNA AMP PROBE: CPT | Performed by: OBSTETRICS & GYNECOLOGY

## 2022-06-13 PROCEDURE — 3074F PR MOST RECENT SYSTOLIC BLOOD PRESSURE < 130 MM HG: ICD-10-PCS | Mod: CPTII,S$GLB,, | Performed by: OBSTETRICS & GYNECOLOGY

## 2022-06-13 PROCEDURE — 1159F PR MEDICATION LIST DOCUMENTED IN MEDICAL RECORD: ICD-10-PCS | Mod: CPTII,S$GLB,, | Performed by: OBSTETRICS & GYNECOLOGY

## 2022-06-13 PROCEDURE — 99999 PR PBB SHADOW E&M-EST. PATIENT-LVL II: ICD-10-PCS | Mod: PBBFAC,,, | Performed by: OBSTETRICS & GYNECOLOGY

## 2022-06-13 PROCEDURE — 1160F RVW MEDS BY RX/DR IN RCRD: CPT | Mod: CPTII,S$GLB,, | Performed by: OBSTETRICS & GYNECOLOGY

## 2022-06-13 PROCEDURE — 81002 URINALYSIS NONAUTO W/O SCOPE: CPT | Mod: S$GLB,,, | Performed by: OBSTETRICS & GYNECOLOGY

## 2022-06-13 PROCEDURE — 99999 PR PBB SHADOW E&M-EST. PATIENT-LVL II: CPT | Mod: PBBFAC,,, | Performed by: OBSTETRICS & GYNECOLOGY

## 2022-06-13 PROCEDURE — 87591 N.GONORRHOEAE DNA AMP PROB: CPT | Mod: 59 | Performed by: OBSTETRICS & GYNECOLOGY

## 2022-06-13 PROCEDURE — 1159F MED LIST DOCD IN RCRD: CPT | Mod: CPTII,S$GLB,, | Performed by: OBSTETRICS & GYNECOLOGY

## 2022-06-13 PROCEDURE — 3074F SYST BP LT 130 MM HG: CPT | Mod: CPTII,S$GLB,, | Performed by: OBSTETRICS & GYNECOLOGY

## 2022-06-13 PROCEDURE — 99214 PR OFFICE/OUTPT VISIT, EST, LEVL IV, 30-39 MIN: ICD-10-PCS | Mod: S$GLB,,, | Performed by: OBSTETRICS & GYNECOLOGY

## 2022-06-13 PROCEDURE — 81002 POCT URINE DIPSTICK WITHOUT MICROSCOPE: ICD-10-PCS | Mod: S$GLB,,, | Performed by: OBSTETRICS & GYNECOLOGY

## 2022-06-13 PROCEDURE — 3078F PR MOST RECENT DIASTOLIC BLOOD PRESSURE < 80 MM HG: ICD-10-PCS | Mod: CPTII,S$GLB,, | Performed by: OBSTETRICS & GYNECOLOGY

## 2022-06-13 PROCEDURE — 3008F PR BODY MASS INDEX (BMI) DOCUMENTED: ICD-10-PCS | Mod: CPTII,S$GLB,, | Performed by: OBSTETRICS & GYNECOLOGY

## 2022-06-13 PROCEDURE — 87481 CANDIDA DNA AMP PROBE: CPT | Mod: 59 | Performed by: OBSTETRICS & GYNECOLOGY

## 2022-06-16 LAB
BACTERIAL VAGINOSIS DNA: POSITIVE
C TRACH DNA SPEC QL NAA+PROBE: NOT DETECTED
CANDIDA GLABRATA DNA: NEGATIVE
CANDIDA KRUSEI DNA: NEGATIVE
CANDIDA RRNA VAG QL PROBE: NEGATIVE
N GONORRHOEA DNA SPEC QL NAA+PROBE: NOT DETECTED
T VAGINALIS RRNA GENITAL QL PROBE: NEGATIVE

## 2022-06-20 DIAGNOSIS — N76.0 BACTERIAL VAGINITIS: Primary | ICD-10-CM

## 2022-06-20 DIAGNOSIS — B96.89 BACTERIAL VAGINITIS: Primary | ICD-10-CM

## 2022-06-20 RX ORDER — METRONIDAZOLE 500 MG/1
500 TABLET ORAL EVERY 12 HOURS
Qty: 10 TABLET | Refills: 0 | Status: SHIPPED | OUTPATIENT
Start: 2022-06-20 | End: 2022-06-25

## 2022-09-07 ENCOUNTER — TELEPHONE (OUTPATIENT)
Dept: FAMILY MEDICINE | Facility: CLINIC | Age: 27
End: 2022-09-07
Payer: COMMERCIAL

## 2022-12-23 NOTE — SUBJECTIVE & OBJECTIVE
Interval History:     She is doing well this morning. She is tolerating a regular diet without nausea or vomiting. She is voiding spontaneously. She is ambulating. She has passed flatus, and has not a BM. Vaginal bleeding is mild. She denies fever or chills. Abdominal pain is moderate and controlled with oral medications. She is breastfeeding. She is undecided reagarding circumcision for her male baby    Objective:     Vital Signs (Most Recent):  Temp: 98.2 °F (36.8 °C) (10/28/20 0825)  Pulse: 72 (10/28/20 0825)  Resp: 18 (10/28/20 0825)  BP: (!) 107/55 (10/28/20 0825)  SpO2: 98 % (10/28/20 0825) Vital Signs (24h Range):  Temp:  [97.8 °F (36.6 °C)-98.5 °F (36.9 °C)] 98.2 °F (36.8 °C)  Pulse:  [] 72  Resp:  [18] 18  SpO2:  [92 %-100 %] 98 %  BP: ()/(52-70) 107/55     Weight: 90 kg (198 lb 6.6 oz)  Body mass index is 34.06 kg/m².      Intake/Output Summary (Last 24 hours) at 10/28/2020 1004  Last data filed at 10/28/2020 0622  Gross per 24 hour   Intake 2110 ml   Output 3080 ml   Net -970 ml           Significant Labs:  Lab Results   Component Value Date    GROUPTRH B NEG 10/27/2020    HEPBSAG Negative 05/13/2020    STREPBCULT No Group B Streptococcus isolated 10/12/2020     Recent Labs   Lab 10/28/20  0309   HGB 8.7*   HCT 27.4*       I have personallly reviewed all pertinent lab results from the last 24 hours.    Physical Exam:   Constitutional: She is oriented to person, place, and time. She appears well-developed and well-nourished.       Cardiovascular: Normal rate.     Pulmonary/Chest: Effort normal.        Abdominal: Soft. She exhibits abdominal incision (Clean, dry and intact). Tenderness: Mild incisional tenderness, no fundal tenderness.     Genitourinary:    Uterus normal.             Musculoskeletal: Edema (1+ edema) present. No tenderness.       Neurological: She is alert and oriented to person, place, and time.     Psychiatric: She has a normal mood and affect.      Number Of Hemigard Strips Per Side: 1

## 2023-03-23 ENCOUNTER — TELEPHONE (OUTPATIENT)
Dept: OBSTETRICS AND GYNECOLOGY | Facility: CLINIC | Age: 28
End: 2023-03-23
Payer: COMMERCIAL

## 2023-03-23 NOTE — TELEPHONE ENCOUNTER
Spoke to pt, she states she would like a call back from Dr. Cahrles to discuss her labs from her PCP.

## 2023-03-24 ENCOUNTER — PATIENT MESSAGE (OUTPATIENT)
Dept: OBSTETRICS AND GYNECOLOGY | Facility: CLINIC | Age: 28
End: 2023-03-24
Payer: COMMERCIAL

## 2023-03-24 NOTE — TELEPHONE ENCOUNTER
Called pt.  Pt states that she's always had heavy periods since she was young.  Pt recently had labs done on 3/18 and sent us results.  Looking to switch PCP because they did not call her regarding results.  PCP recommendations given.  Pt reports always being tired and fatigue.  Used to be on Iron infusions but stopped that.  PCP prescribed her Ferritin pills and Vit D pills that she will  today.    Please see attachment to review her lab results.  She only sent what was flagged red.

## 2023-03-28 ENCOUNTER — OFFICE VISIT (OUTPATIENT)
Dept: OBSTETRICS AND GYNECOLOGY | Facility: CLINIC | Age: 28
End: 2023-03-28
Payer: COMMERCIAL

## 2023-03-28 DIAGNOSIS — D50.9 IRON DEFICIENCY ANEMIA, UNSPECIFIED IRON DEFICIENCY ANEMIA TYPE: ICD-10-CM

## 2023-03-28 DIAGNOSIS — N92.0 MENORRHAGIA WITH REGULAR CYCLE: Primary | ICD-10-CM

## 2023-03-28 DIAGNOSIS — Z30.430 ENCOUNTER FOR IUD INSERTION: Primary | ICD-10-CM

## 2023-03-28 DIAGNOSIS — Z00.00 HEALTHCARE MAINTENANCE: ICD-10-CM

## 2023-03-28 PROCEDURE — 1159F MED LIST DOCD IN RCRD: CPT | Mod: CPTII,95,, | Performed by: NURSE PRACTITIONER

## 2023-03-28 PROCEDURE — 1160F RVW MEDS BY RX/DR IN RCRD: CPT | Mod: CPTII,95,, | Performed by: NURSE PRACTITIONER

## 2023-03-28 PROCEDURE — 1159F PR MEDICATION LIST DOCUMENTED IN MEDICAL RECORD: ICD-10-PCS | Mod: CPTII,95,, | Performed by: NURSE PRACTITIONER

## 2023-03-28 PROCEDURE — 99213 OFFICE O/P EST LOW 20 MIN: CPT | Mod: 95,,, | Performed by: NURSE PRACTITIONER

## 2023-03-28 PROCEDURE — 99213 PR OFFICE/OUTPT VISIT, EST, LEVL III, 20-29 MIN: ICD-10-PCS | Mod: 95,,, | Performed by: NURSE PRACTITIONER

## 2023-03-28 PROCEDURE — 1160F PR REVIEW ALL MEDS BY PRESCRIBER/CLIN PHARMACIST DOCUMENTED: ICD-10-PCS | Mod: CPTII,95,, | Performed by: NURSE PRACTITIONER

## 2023-03-28 NOTE — PROGRESS NOTES
History & Physical  Gynecology      SUBJECTIVE:     Chief Complaint: DAVINA       History of Present Illness: Patient presents via virtual visit to discuss long standing iron deficient anemia and heavy menses. Recently initiated on Iron and Vitamin D by her PCP. History of iron infusions during pregnancy due to persistent anemia. She feels weak, fatigued, difficulty parenting daily due to exhaustion. Current Paragard IUD, placed postpartum, expressed fear regarding hormonal birth control due to possible weight gain. Her menstrual cycles occur once monthly but extremely heavy, would like to discuss other options for management.         Review of patient's allergies indicates:  No Known Allergies    Past Medical History:   Diagnosis Date    Anemia     Iron deficiency    Asthma      Past Surgical History:   Procedure Laterality Date     SECTION  2018    [facility] (FTP & NRFHT's)     SECTION N/A 10/27/2020    Procedure:  SECTION;  Surgeon: Malu Araya MD;  Location: [facility] L&D;  Service: OB/GYN;  Laterality: N/A;     OB History          4    Para   2    Term   2       0    AB   2    Living   2         SAB   0    IAB   1    Ectopic   0    Multiple        Live Births   2               Family History   Problem Relation Age of Onset    No Known Problems Father     No Known Problems Mother     No Known Problems Brother      Social History     Tobacco Use    Smoking status: Former    Smokeless tobacco: Never   Substance Use Topics    Alcohol use: No    Drug use: No       Current Outpatient Medications   Medication Sig    FERRALET 90 DUAL-IRON DELIVERY 90-1-12-50 mg-mg-mcg-mg Tab TK 1 T PO QD    ibuprofen (ADVIL,MOTRIN) 800 MG tablet Take 1 tablet (800 mg total) by mouth every 6 (six) hours as needed for Pain.     Current Facility-Administered Medications   Medication    copper intrauterine device 380 square mm  mm         Review of Systems:  Review of Systems    Constitutional:  Positive for fatigue. Negative for activity change, appetite change, chills and fever.   HENT:  Negative for mouth sores.    Eyes:  Negative for visual disturbance.   Respiratory:  Negative for cough and shortness of breath.    Cardiovascular:  Negative for chest pain and leg swelling.   Gastrointestinal:  Negative for abdominal pain, constipation, diarrhea, nausea, vomiting and reflux.   Endocrine: Negative for hyperthyroidism and hypothyroidism.   Genitourinary:  Positive for menorrhagia and menstrual problem. Negative for dysuria, flank pain, frequency, genital sores, hematuria, pelvic pain, vaginal bleeding, vaginal discharge, vaginal pain, vaginal dryness and vaginal odor.   Musculoskeletal:  Negative for arthralgias, back pain and myalgias.   Integumentary:  Negative for rash, breast mass and breast tenderness.   Neurological:  Negative for headaches.   Hematological:  Negative for adenopathy. Does not bruise/bleed easily.   Psychiatric/Behavioral:  Negative for depression. The patient is not nervous/anxious.    Breast: Negative for asymmetry, mass and tenderness     OBJECTIVE:     Physical Exam:  Physical Exam  Vitals and nursing note reviewed.   Constitutional:       Appearance: Normal appearance.   HENT:      Head: Normocephalic and atraumatic.      Nose: Nose normal.      Mouth/Throat:      Mouth: Mucous membranes are moist.   Eyes:      Conjunctiva/sclera: Conjunctivae normal.   Cardiovascular:      Rate and Rhythm: Normal rate.   Pulmonary:      Effort: Pulmonary effort is normal.   Musculoskeletal:         General: Normal range of motion.      Cervical back: Normal range of motion.   Neurological:      General: No focal deficit present.      Mental Status: She is alert.   Psychiatric:         Mood and Affect: Mood normal.         Behavior: Behavior normal.         Thought Content: Thought content normal.         Judgment: Judgment normal.         ASSESSMENT:       ICD-10-CM ICD-9-CM     1. Menorrhagia with regular cycle  N92.0 626.2 US Pelvis Comp with Transvag NON-OB (xpd      2. Healthcare maintenance  Z00.00 V70.0 Ambulatory referral/consult to Internal Medicine      3. Iron deficiency anemia, unspecified iron deficiency anemia type  D50.9 280.9 Ambulatory referral/consult to Internal Medicine             Plan:      Instructed to continue Iron and Vitamin D. Repeat lab work planned, will refer to hemat if persistent anemia. Desires Mirena IUD for improved control of heavy menses, plan for Paragard removal and Mirena placement. Pelvic US to rule out further etiology. ED precautions for anemia. PCP referral per patient request.    FU with NP as needed.    The patient location is: [place]  The chief complaint leading to consultation is: Anemia, heavy menses    Visit type: audiovisual    Face to Face time with patient: 20 minutes  20 minutes of total time spent on the encounter, which includes face to face time and non-face to face time preparing to see the patient (eg, review of tests), Obtaining and/or reviewing separately obtained history, Documenting clinical information in the electronic or other health record, Independently interpreting results (not separately reported) and communicating results to the patient/family/caregiver, or Care coordination (not separately reported).     Each patient to whom he or she provides medical services by telemedicine is:  (1) informed of the relationship between the physician and patient and the respective role of any other health care provider with respect to management of the patient; and (2) notified that he or she may decline to receive medical services by telemedicine and may withdraw from such care at any time.       CAITIE ShannonC

## 2023-03-29 ENCOUNTER — PATIENT MESSAGE (OUTPATIENT)
Dept: OBSTETRICS AND GYNECOLOGY | Facility: CLINIC | Age: 28
End: 2023-03-29
Payer: COMMERCIAL

## 2023-03-29 ENCOUNTER — TELEPHONE (OUTPATIENT)
Dept: OBSTETRICS AND GYNECOLOGY | Facility: CLINIC | Age: 28
End: 2023-03-29

## 2023-03-29 DIAGNOSIS — Z00.00 HEALTHCARE MAINTENANCE: Primary | ICD-10-CM

## 2023-05-22 ENCOUNTER — TELEPHONE (OUTPATIENT)
Dept: OBSTETRICS AND GYNECOLOGY | Facility: CLINIC | Age: 28
End: 2023-05-22
Payer: COMMERCIAL

## 2023-05-22 DIAGNOSIS — N92.0 MENORRHAGIA WITH REGULAR CYCLE: ICD-10-CM

## 2023-05-22 DIAGNOSIS — D64.9 ANEMIA, UNSPECIFIED TYPE: Primary | ICD-10-CM

## 2023-08-16 ENCOUNTER — TELEPHONE (OUTPATIENT)
Dept: OBSTETRICS AND GYNECOLOGY | Facility: CLINIC | Age: 28
End: 2023-08-16
Payer: COMMERCIAL

## 2023-08-16 ENCOUNTER — LAB VISIT (OUTPATIENT)
Dept: LAB | Facility: HOSPITAL | Age: 28
End: 2023-08-16
Attending: NURSE PRACTITIONER
Payer: COMMERCIAL

## 2023-08-16 ENCOUNTER — OFFICE VISIT (OUTPATIENT)
Dept: OBSTETRICS AND GYNECOLOGY | Facility: CLINIC | Age: 28
End: 2023-08-16
Payer: COMMERCIAL

## 2023-08-16 VITALS
SYSTOLIC BLOOD PRESSURE: 118 MMHG | WEIGHT: 198.19 LBS | BODY MASS INDEX: 32.98 KG/M2 | DIASTOLIC BLOOD PRESSURE: 62 MMHG

## 2023-08-16 DIAGNOSIS — N93.9 ABNORMAL UTERINE BLEEDING: ICD-10-CM

## 2023-08-16 DIAGNOSIS — Z11.3 SCREEN FOR STD (SEXUALLY TRANSMITTED DISEASE): ICD-10-CM

## 2023-08-16 DIAGNOSIS — Z01.419 WOMEN'S ANNUAL ROUTINE GYNECOLOGICAL EXAMINATION: Primary | ICD-10-CM

## 2023-08-16 DIAGNOSIS — Z12.4 SCREENING FOR CERVICAL CANCER: ICD-10-CM

## 2023-08-16 LAB
HBV SURFACE AG SERPL QL IA: NORMAL
HIV 1+2 AB+HIV1 P24 AG SERPL QL IA: NORMAL

## 2023-08-16 PROCEDURE — 3008F BODY MASS INDEX DOCD: CPT | Mod: CPTII,S$GLB,, | Performed by: NURSE PRACTITIONER

## 2023-08-16 PROCEDURE — 3074F PR MOST RECENT SYSTOLIC BLOOD PRESSURE < 130 MM HG: ICD-10-PCS | Mod: CPTII,S$GLB,, | Performed by: NURSE PRACTITIONER

## 2023-08-16 PROCEDURE — 87340 HEPATITIS B SURFACE AG IA: CPT | Performed by: NURSE PRACTITIONER

## 2023-08-16 PROCEDURE — 3078F PR MOST RECENT DIASTOLIC BLOOD PRESSURE < 80 MM HG: ICD-10-PCS | Mod: CPTII,S$GLB,, | Performed by: NURSE PRACTITIONER

## 2023-08-16 PROCEDURE — 99395 PR PREVENTIVE VISIT,EST,18-39: ICD-10-PCS | Mod: S$GLB,,, | Performed by: NURSE PRACTITIONER

## 2023-08-16 PROCEDURE — 86592 SYPHILIS TEST NON-TREP QUAL: CPT | Performed by: NURSE PRACTITIONER

## 2023-08-16 PROCEDURE — 99395 PREV VISIT EST AGE 18-39: CPT | Mod: S$GLB,,, | Performed by: NURSE PRACTITIONER

## 2023-08-16 PROCEDURE — 3008F PR BODY MASS INDEX (BMI) DOCUMENTED: ICD-10-PCS | Mod: CPTII,S$GLB,, | Performed by: NURSE PRACTITIONER

## 2023-08-16 PROCEDURE — 36415 COLL VENOUS BLD VENIPUNCTURE: CPT | Performed by: NURSE PRACTITIONER

## 2023-08-16 PROCEDURE — 88175 CYTOPATH C/V AUTO FLUID REDO: CPT | Performed by: NURSE PRACTITIONER

## 2023-08-16 PROCEDURE — 99999 PR PBB SHADOW E&M-EST. PATIENT-LVL III: CPT | Mod: PBBFAC,,, | Performed by: NURSE PRACTITIONER

## 2023-08-16 PROCEDURE — 99999 PR PBB SHADOW E&M-EST. PATIENT-LVL III: ICD-10-PCS | Mod: PBBFAC,,, | Performed by: NURSE PRACTITIONER

## 2023-08-16 PROCEDURE — 87389 HIV-1 AG W/HIV-1&-2 AB AG IA: CPT | Performed by: NURSE PRACTITIONER

## 2023-08-16 PROCEDURE — 3078F DIAST BP <80 MM HG: CPT | Mod: CPTII,S$GLB,, | Performed by: NURSE PRACTITIONER

## 2023-08-16 PROCEDURE — 3074F SYST BP LT 130 MM HG: CPT | Mod: CPTII,S$GLB,, | Performed by: NURSE PRACTITIONER

## 2023-08-16 RX ORDER — FERROUS SULFATE TAB 325 MG (65 MG ELEMENTAL FE) 325 (65 FE) MG
TAB ORAL EVERY MORNING
COMMUNITY
Start: 2023-03-23 | End: 2024-01-16

## 2023-08-16 RX ORDER — ERGOCALCIFEROL 1.25 MG/1
50000 CAPSULE ORAL
COMMUNITY
Start: 2023-03-23 | End: 2023-08-29

## 2023-08-16 NOTE — TELEPHONE ENCOUNTER
----- Message from Brittany Oliveira NP sent at 8/16/2023  3:52 PM CDT -----  Please schedule for iron panel from May

## 2023-08-17 ENCOUNTER — TELEPHONE (OUTPATIENT)
Dept: OBSTETRICS AND GYNECOLOGY | Facility: CLINIC | Age: 28
End: 2023-08-17
Payer: COMMERCIAL

## 2023-08-17 DIAGNOSIS — D64.9 SYMPTOMATIC ANEMIA: Primary | ICD-10-CM

## 2023-08-17 LAB — RPR SER QL: NORMAL

## 2023-08-17 RX ORDER — METHYLPREDNISOLONE SOD SUCC 125 MG
125 VIAL (EA) INJECTION ONCE AS NEEDED
Status: CANCELLED | OUTPATIENT
Start: 2023-08-17

## 2023-08-17 RX ORDER — EPINEPHRINE 0.3 MG/.3ML
0.3 INJECTION SUBCUTANEOUS ONCE AS NEEDED
Status: CANCELLED | OUTPATIENT
Start: 2023-08-17

## 2023-08-17 RX ORDER — SODIUM CHLORIDE 0.9 % (FLUSH) 0.9 %
10 SYRINGE (ML) INJECTION
Status: CANCELLED | OUTPATIENT
Start: 2023-08-17

## 2023-08-17 RX ORDER — DIPHENHYDRAMINE HYDROCHLORIDE 50 MG/ML
50 INJECTION INTRAMUSCULAR; INTRAVENOUS ONCE AS NEEDED
Status: CANCELLED | OUTPATIENT
Start: 2023-08-17

## 2023-08-17 NOTE — TELEPHONE ENCOUNTER
Contact with patient, persistent symptomatic anemia. Poor compliance with oral iron, desires infusions. Will activate therapy plan.

## 2023-08-24 LAB
FINAL PATHOLOGIC DIAGNOSIS: NORMAL
Lab: NORMAL

## 2023-08-29 ENCOUNTER — OFFICE VISIT (OUTPATIENT)
Dept: OBSTETRICS AND GYNECOLOGY | Facility: CLINIC | Age: 28
End: 2023-08-29
Payer: COMMERCIAL

## 2023-08-29 VITALS
SYSTOLIC BLOOD PRESSURE: 122 MMHG | BODY MASS INDEX: 33.53 KG/M2 | DIASTOLIC BLOOD PRESSURE: 70 MMHG | HEIGHT: 65 IN | WEIGHT: 201.25 LBS

## 2023-08-29 DIAGNOSIS — T83.32XA MALPOSITIONED INTRAUTERINE DEVICE (IUD), INITIAL ENCOUNTER: Primary | ICD-10-CM

## 2023-08-29 DIAGNOSIS — Z30.09 GENERAL COUNSELING AND ADVICE FOR CONTRACEPTIVE MANAGEMENT: ICD-10-CM

## 2023-08-29 PROCEDURE — 1159F MED LIST DOCD IN RCRD: CPT | Mod: CPTII,S$GLB,, | Performed by: OBSTETRICS & GYNECOLOGY

## 2023-08-29 PROCEDURE — 99999 PR PBB SHADOW E&M-EST. PATIENT-LVL III: CPT | Mod: PBBFAC,,, | Performed by: OBSTETRICS & GYNECOLOGY

## 2023-08-29 PROCEDURE — 3008F PR BODY MASS INDEX (BMI) DOCUMENTED: ICD-10-PCS | Mod: CPTII,S$GLB,, | Performed by: OBSTETRICS & GYNECOLOGY

## 2023-08-29 PROCEDURE — 1159F PR MEDICATION LIST DOCUMENTED IN MEDICAL RECORD: ICD-10-PCS | Mod: CPTII,S$GLB,, | Performed by: OBSTETRICS & GYNECOLOGY

## 2023-08-29 PROCEDURE — 99999 PR PBB SHADOW E&M-EST. PATIENT-LVL III: ICD-10-PCS | Mod: PBBFAC,,, | Performed by: OBSTETRICS & GYNECOLOGY

## 2023-08-29 PROCEDURE — 99213 OFFICE O/P EST LOW 20 MIN: CPT | Mod: 25,S$GLB,, | Performed by: OBSTETRICS & GYNECOLOGY

## 2023-08-29 PROCEDURE — 3078F PR MOST RECENT DIASTOLIC BLOOD PRESSURE < 80 MM HG: ICD-10-PCS | Mod: CPTII,S$GLB,, | Performed by: OBSTETRICS & GYNECOLOGY

## 2023-08-29 PROCEDURE — 3078F DIAST BP <80 MM HG: CPT | Mod: CPTII,S$GLB,, | Performed by: OBSTETRICS & GYNECOLOGY

## 2023-08-29 PROCEDURE — 3074F PR MOST RECENT SYSTOLIC BLOOD PRESSURE < 130 MM HG: ICD-10-PCS | Mod: CPTII,S$GLB,, | Performed by: OBSTETRICS & GYNECOLOGY

## 2023-08-29 PROCEDURE — 3074F SYST BP LT 130 MM HG: CPT | Mod: CPTII,S$GLB,, | Performed by: OBSTETRICS & GYNECOLOGY

## 2023-08-29 PROCEDURE — 58301 REMOVAL OF IUD: ICD-10-PCS | Mod: S$GLB,,, | Performed by: OBSTETRICS & GYNECOLOGY

## 2023-08-29 PROCEDURE — 58301 REMOVE INTRAUTERINE DEVICE: CPT | Mod: S$GLB,,, | Performed by: OBSTETRICS & GYNECOLOGY

## 2023-08-29 PROCEDURE — 3008F BODY MASS INDEX DOCD: CPT | Mod: CPTII,S$GLB,, | Performed by: OBSTETRICS & GYNECOLOGY

## 2023-08-29 PROCEDURE — 1160F PR REVIEW ALL MEDS BY PRESCRIBER/CLIN PHARMACIST DOCUMENTED: ICD-10-PCS | Mod: CPTII,S$GLB,, | Performed by: OBSTETRICS & GYNECOLOGY

## 2023-08-29 PROCEDURE — 1160F RVW MEDS BY RX/DR IN RCRD: CPT | Mod: CPTII,S$GLB,, | Performed by: OBSTETRICS & GYNECOLOGY

## 2023-08-29 PROCEDURE — 99213 PR OFFICE/OUTPT VISIT, EST, LEVL III, 20-29 MIN: ICD-10-PCS | Mod: 25,S$GLB,, | Performed by: OBSTETRICS & GYNECOLOGY

## 2023-08-29 RX ORDER — NORELGESTROMIN AND ETHINYL ESTRADIOL 35; 150 UG/MG; UG/MG
PATCH TRANSDERMAL
Qty: 3 PATCH | Refills: 11 | Status: SHIPPED | OUTPATIENT
Start: 2023-08-29 | End: 2023-08-29 | Stop reason: CLARIF

## 2023-08-29 RX ORDER — NORELGESTROMIN AND ETHINYL ESTRADIOL 150; 35 UG/D; UG/D
PATCH TRANSDERMAL
Qty: 4 PATCH | Refills: 11 | Status: SHIPPED | OUTPATIENT
Start: 2023-08-29 | End: 2024-01-16

## 2023-09-26 NOTE — PROGRESS NOTES
CC: Annual  HPI: Pt is a 28 y.o.  female who presents for routine annual exam.She does want STD screening.   The patient participates in regular exercise: yES.  The patient does not smoke.  The patient wears seatbelts.   Pt denies any domestic violence.    Last pap in 2020 with normal result. Irregular menses, current use of Paragard IUD.     FH:  Breast cancer: none  Colon cancer: none  Ovarian cancer: none  Endometrial cancer: none    ROS:  GENERAL: Feeling well overall. Denies fever or chills.   SKIN: Denies rash or lesions.   HEAD: Denies head injury or headache.   NODES: Denies enlarged lymph nodes.   CHEST: Denies chest pain or shortness of breath.   CARDIOVASCULAR: Denies palpitations or left sided chest pain.   ABDOMEN: No abdominal pain, constipation, diarrhea, nausea, vomiting or rectal bleeding.   URINARY: No dysuria, hematuria, or burning on urination.  REPRODUCTIVE: See HPI.   BREASTS: Denies pain, lumps, or nipple discharge.   HEMATOLOGIC: No easy bruisability or excessive bleeding.   MUSCULOSKELETAL: Denies joint pain or swelling.   NEUROLOGIC: Denies syncope or weakness.   PSYCHIATRIC: Denies depression, anxiety or mood swings.    PE:   APPEARANCE: Well nourished, well developed, Black or  female in no acute distress.  NODES: no cervical, supraclavicular, or inguinal lymphadenopathy  BREASTS: Symmetrical, no skin changes or visible lesions. No palpable masses, nipple discharge or adenopathy bilaterally.  ABDOMEN: Soft. No tenderness or masses. No distention. No hernias palpated. No CVA tenderness.  VULVA: No lesions. Normal external female genitalia.  URETHRAL MEATUS: Normal size and location, no lesions, no prolapse.  URETHRA: No masses, tenderness, or prolapse.  VAGINA: Moist. No lesions or lacerations noted. No abnormal discharge present. No odor present.   CERVIX: No lesions or discharge. No cervical motion tenderness. IUD strings present.  UTERUS: Normal size, regular shape,  mobile, non-tender.  ADNEXA: No tenderness. No fullness or masses palpated in the adnexal regions.   ANUS PERINEUM: Normal.      Diagnosis:  1. Women's annual routine gynecological examination    2. Screen for STD (sexually transmitted disease)    3. Screening for cervical cancer    4. Abnormal uterine bleeding        Plan:     Orders Placed This Encounter    HIV 1/2 Ag/Ab (4th Gen)    RPR    HEPATITIS B SURFACE ANTIGEN    Liquid-Based Pap Smear, Screening     Pap  STD Blood panel only, reports recent GC swab      FU with Dr. Charles with pelvic US for assessment of AUB.     Patient was counseled today on the new ACS guidelines for cervical cytology screening as well as the current recommendations for breast cancer screening. She was counseled to follow up with her PCP for other routine health maintenance. Counseling session lasted approximately 10 minutes, and all her questions were answered.    Follow-up with me in 1 year for routine exam       DAVINA De La Torre

## 2023-10-23 NOTE — PROCEDURES
Removal of IUD    Date/Time: 8/29/2023 2:00 PM    Performed by: Adriana Charles MD  Authorized by: Adriana Charles MD    Consent obtained:  Verbal  Consent given by:  Patient  Procedure risks and benefits discussed: yes    Patient questions answered: yes    Patient agrees, verbalizes understanding, and wants to proceed: yes    Educational handouts given: yes    Instructions and paperwork completed: yes    Implant grasped by: ring forceps  Removal due to infection and inflammatory reaction: no    Removal due to mechanical complications of Nexplanon: yes    Removed with no complications: yes

## 2023-10-23 NOTE — PROGRESS NOTES
Chief Complaint: U/S Results     HPI:      Yamel Toribio is a 28 y.o.  who presents today for follow up of U/S results.  LMP: Patient's last menstrual period was 2023.    Ultrasound ordered for irregular menstrual bleeding with IUD in place.  No other issues, problems, or complaints.     OB History          4    Para   2    Term   2       0    AB   2    Living   2         SAB   0    IAB   1    Ectopic   0    Multiple        Live Births   2               Past Medical History:   Diagnosis Date    Anemia     Iron deficiency    Asthma      Past Surgical History:   Procedure Laterality Date     SECTION  2018    [facility] (FTP & NRFHT's)     SECTION N/A 10/27/2020    Procedure:  SECTION;  Surgeon: Malu Araya MD;  [facility];  Service: OB/GYN;  Laterality: N/A;     Social History     Socioeconomic History    Marital status: Single   Tobacco Use    Smoking status: Former    Smokeless tobacco: Never   Substance and Sexual Activity    Alcohol use: No    Drug use: No    Sexual activity: Yes     Partners: Male     Birth control/protection: None   Social History Narrative    ** Merged History Encounter **          Family History   Problem Relation Age of Onset    No Known Problems Father     No Known Problems Mother     No Known Problems Brother        Current Outpatient Medications:     FEROSUL 325 mg (65 mg iron) Tab tablet, Take by mouth every morning., Disp: , Rfl:     norelgestromin-ethinyl estradiol (XULANE) 150-35 mcg/24 hr, Apply patch to skin weekly x 3 weeks, then no patch for 1 week, Disp: 4 patch, Rfl: 11    Current Facility-Administered Medications:     copper intrauterine device 380 square mm  mm, 380 mm, Intrauterine, , Adriana Charles MD, 380 mm at 12/10/20 1400    ROS:     GENERAL: Denies unintentional weight gain or weight loss. Feeling well overall.   SKIN: Denies rash or lesions.   HEENT: Denies headaches, or vision changes.  "  ABDOMEN: Denies abdominal pain, constipation, diarrhea, nausea, vomiting, change in appetite.  URINARY: Denies frequency, dysuria, hematuria.  NEUROLOGIC: Denies syncope or weakness.   PSYCHIATRIC: Denies depression, anxiety or mood swings.    Physical Exam:      PHYSICAL EXAM:  /70   Ht 5' 5" (1.651 m)   Wt 91.3 kg (201 lb 4.5 oz)   LMP 2023   BMI 33.49 kg/m²   Body mass index is 33.49 kg/m².     APPEARANCE: Well nourished, well developed, in no acute distress.  PSYCH: Appropriate mood and affect.  SKIN: No acne or hirsutism.      US Pelvis Comp with Transvag NON-OB (xpd  Narrative: EXAMINATION:  US PELVIS COMP WITH TRANSVAG NON-OB (XPD)    CLINICAL HISTORY:  Excessive and frequent menstruation with regular cycle    TECHNIQUE:  Transabdominal sonography of the pelvis was performed, followed by transvaginal sonography to better evaluate the uterus and ovaries.    COMPARISON:  None.    FINDINGS:  Uterus:    Size: 10.4 x 5.7 x 5.5 cm    The parenchyma is homogeneous.  Endometrium is normal caliber measuring 0.8 cm.    Right ovary:    Size: 3.5 x 1.5 x 2.7 cm    Left ovary:    Size: 3 x 2.3 x 2.6 cm    Free Fluid:    Mild  Impression: Intrauterine device located along the lower uterine segment.    Multi-septated fluid focus in the cul-de-sac is nonspecific and could represent a small peritoneal inclusion cyst.    This report was flagged in Epic as abnormal.    Electronically signed by: Lisa Joiner  Date:    2023  Time:    14:06        Assessment/Plan:     28 y.o.     Malpositioned intrauterine device (IUD), initial encounter  Patient counseled IUD out of place and removal recommended. See procedure note for details. The risks of, benefits of, and alternatives of various forms of contraception were discussed at this visit. After a discussion of the R/B/A of fertility awareness, barrier contraception, hormonal pills, injections, patches, rings, hormonal and non-hormonal IUDs, and the " subdermal implant, all of  questions were answered, and she has opted for  patch .     General counseling and advice for contraceptive management  -     norelgestromin-ethinyl estradiol (XULANE) 150-35 mcg/24 hr; Apply patch to skin weekly x 3 weeks, then no patch for 1 week  Dispense: 4 patch; Refill: 11      Counselin.  U/S results reviewed with patient.  2.  Follow up with PCP for other health maintenance.  3.  RTC for annual exam or sooner if needed.    Use of the [employer] Patient Portal discussed and encouraged during today's visit.           Adriana Charles MD

## 2023-12-12 ENCOUNTER — OFFICE VISIT (OUTPATIENT)
Dept: OBSTETRICS AND GYNECOLOGY | Facility: CLINIC | Age: 28
End: 2023-12-12
Payer: COMMERCIAL

## 2023-12-12 VITALS — BODY MASS INDEX: 31.96 KG/M2 | HEIGHT: 65 IN | WEIGHT: 191.81 LBS

## 2023-12-12 DIAGNOSIS — Z11.3 SCREENING EXAMINATION FOR STD (SEXUALLY TRANSMITTED DISEASE): ICD-10-CM

## 2023-12-12 DIAGNOSIS — N92.1 BREAKTHROUGH BLEEDING ON CONTRACEPTIVE PATCH: Primary | ICD-10-CM

## 2023-12-12 PROCEDURE — 3008F BODY MASS INDEX DOCD: CPT | Mod: CPTII,S$GLB,, | Performed by: STUDENT IN AN ORGANIZED HEALTH CARE EDUCATION/TRAINING PROGRAM

## 2023-12-12 PROCEDURE — 1160F RVW MEDS BY RX/DR IN RCRD: CPT | Mod: CPTII,S$GLB,, | Performed by: STUDENT IN AN ORGANIZED HEALTH CARE EDUCATION/TRAINING PROGRAM

## 2023-12-12 PROCEDURE — 87491 CHLMYD TRACH DNA AMP PROBE: CPT | Performed by: STUDENT IN AN ORGANIZED HEALTH CARE EDUCATION/TRAINING PROGRAM

## 2023-12-12 PROCEDURE — 99999 PR PBB SHADOW E&M-EST. PATIENT-LVL III: ICD-10-PCS | Mod: PBBFAC,,, | Performed by: STUDENT IN AN ORGANIZED HEALTH CARE EDUCATION/TRAINING PROGRAM

## 2023-12-12 PROCEDURE — 1159F MED LIST DOCD IN RCRD: CPT | Mod: CPTII,S$GLB,, | Performed by: STUDENT IN AN ORGANIZED HEALTH CARE EDUCATION/TRAINING PROGRAM

## 2023-12-12 PROCEDURE — 99213 OFFICE O/P EST LOW 20 MIN: CPT | Mod: S$GLB,,, | Performed by: STUDENT IN AN ORGANIZED HEALTH CARE EDUCATION/TRAINING PROGRAM

## 2023-12-12 PROCEDURE — 3008F PR BODY MASS INDEX (BMI) DOCUMENTED: ICD-10-PCS | Mod: CPTII,S$GLB,, | Performed by: STUDENT IN AN ORGANIZED HEALTH CARE EDUCATION/TRAINING PROGRAM

## 2023-12-12 PROCEDURE — 87591 N.GONORRHOEAE DNA AMP PROB: CPT | Performed by: STUDENT IN AN ORGANIZED HEALTH CARE EDUCATION/TRAINING PROGRAM

## 2023-12-12 PROCEDURE — 99999 PR PBB SHADOW E&M-EST. PATIENT-LVL III: CPT | Mod: PBBFAC,,, | Performed by: STUDENT IN AN ORGANIZED HEALTH CARE EDUCATION/TRAINING PROGRAM

## 2023-12-12 PROCEDURE — 81514 NFCT DS BV&VAGINITIS DNA ALG: CPT | Performed by: STUDENT IN AN ORGANIZED HEALTH CARE EDUCATION/TRAINING PROGRAM

## 2023-12-12 PROCEDURE — 99213 PR OFFICE/OUTPT VISIT, EST, LEVL III, 20-29 MIN: ICD-10-PCS | Mod: S$GLB,,, | Performed by: STUDENT IN AN ORGANIZED HEALTH CARE EDUCATION/TRAINING PROGRAM

## 2023-12-12 PROCEDURE — 1159F PR MEDICATION LIST DOCUMENTED IN MEDICAL RECORD: ICD-10-PCS | Mod: CPTII,S$GLB,, | Performed by: STUDENT IN AN ORGANIZED HEALTH CARE EDUCATION/TRAINING PROGRAM

## 2023-12-12 PROCEDURE — 1160F PR REVIEW ALL MEDS BY PRESCRIBER/CLIN PHARMACIST DOCUMENTED: ICD-10-PCS | Mod: CPTII,S$GLB,, | Performed by: STUDENT IN AN ORGANIZED HEALTH CARE EDUCATION/TRAINING PROGRAM

## 2023-12-12 NOTE — PROGRESS NOTES
"Chief Complaint: Irregular cycle, STD screening     HPI:      Yamel Toribio is a 28 y.o.  who presents today for irregular cycles and desiring STD screening. Pt of Dr Charles. History of IUD - started having heavy cycles and found to be malpositioned so was removed on 23. She then started Xulane patches. Reports now having a cycle every 2 weeks; the first 2 days are heavy followed by 2-3 days of spotting. Has also recognized vaginal odor past 2 days. Denies abnormal vaginal discharge, itching, pelvic pain, urinary symptoms. Desiring STD screening. No other concerns today.    Physical Exam:      PHYSICAL EXAM:  Ht 5' 5" (1.651 m)   Wt 87 kg (191 lb 12.8 oz)   Breastfeeding No   BMI 31.92 kg/m²   Body mass index is 31.92 kg/m².     APPEARANCE: Well nourished, well developed, in no acute distress.  PELVIC:  External genitalia and urethra within normal limits. Vagina without lesions, with mucus like yellow discharge, without erythema, without ulcers.  Cervix without cervical motion tenderness, non-friable, no lesions or polyps. Uterus: normal size, mobile, non-tender.  No adnexal masses or tenderness palpated.    Assessment/Plan:     Breakthrough bleeding on contraceptive patch  -     Vaginosis Screen by DNA Probe    Screening examination for STD (sexually transmitted disease)  -     C. trachomatis/N. gonorrhoeae by AMP DNA    - affirm and gc/cz collected  - discussed possible causes for irregular cycle with patient   - also discussed management options, including other contraceptive options like patch, ring, injections, LARCs; declines these options at this time, would prefer salpingectomy for contraception so she no longer needs to take birth control; reports she is now 100% certain she desires sterilization, will inform primary GYN of patient's decision   - she is also interested in ablation which she has discussed with Dr Charles in the past,  briefly answered questions regarding this  - rtc when due for " annual or sooner if needed  - will inform primary GYN of above visit    Nicky Aiken MD  Obstetrics and Gynecology  [facility]

## 2023-12-13 DIAGNOSIS — N76.0 BV (BACTERIAL VAGINOSIS): Primary | ICD-10-CM

## 2023-12-13 DIAGNOSIS — B96.89 BV (BACTERIAL VAGINOSIS): Primary | ICD-10-CM

## 2023-12-13 RX ORDER — METRONIDAZOLE 500 MG/1
500 TABLET ORAL 2 TIMES DAILY
Qty: 14 TABLET | Refills: 0 | Status: SHIPPED | OUTPATIENT
Start: 2023-12-13 | End: 2024-01-16

## 2023-12-19 ENCOUNTER — PATIENT MESSAGE (OUTPATIENT)
Dept: OBSTETRICS AND GYNECOLOGY | Facility: CLINIC | Age: 28
End: 2023-12-19
Payer: COMMERCIAL

## 2023-12-19 DIAGNOSIS — Z30.09 GENERAL COUNSELING AND ADVICE FOR CONTRACEPTIVE MANAGEMENT: Primary | ICD-10-CM

## 2024-01-09 ENCOUNTER — PATIENT MESSAGE (OUTPATIENT)
Dept: OBSTETRICS AND GYNECOLOGY | Facility: CLINIC | Age: 29
End: 2024-01-09
Payer: COMMERCIAL

## 2024-01-16 ENCOUNTER — PROCEDURE VISIT (OUTPATIENT)
Dept: OBSTETRICS AND GYNECOLOGY | Facility: CLINIC | Age: 29
End: 2024-01-16
Payer: COMMERCIAL

## 2024-01-16 VITALS
WEIGHT: 189.63 LBS | HEIGHT: 65 IN | DIASTOLIC BLOOD PRESSURE: 66 MMHG | BODY MASS INDEX: 31.59 KG/M2 | SYSTOLIC BLOOD PRESSURE: 112 MMHG

## 2024-01-16 DIAGNOSIS — Z01.812 PRE-PROCEDURE LAB EXAM: ICD-10-CM

## 2024-01-16 DIAGNOSIS — Z30.430 ENCOUNTER FOR IUD INSERTION: Primary | ICD-10-CM

## 2024-01-16 LAB
B-HCG UR QL: NEGATIVE
CTP QC/QA: YES

## 2024-01-16 PROCEDURE — 99499 UNLISTED E&M SERVICE: CPT | Mod: S$GLB,,, | Performed by: OBSTETRICS & GYNECOLOGY

## 2024-01-16 PROCEDURE — 81025 URINE PREGNANCY TEST: CPT | Mod: S$GLB,,, | Performed by: OBSTETRICS & GYNECOLOGY

## 2024-01-16 PROCEDURE — 58300 INSERT INTRAUTERINE DEVICE: CPT | Mod: S$GLB,,, | Performed by: OBSTETRICS & GYNECOLOGY

## 2024-01-16 NOTE — PROCEDURES
Insertion of IUD    Date/Time: 1/16/2024 1:00 PM    Performed by: Adriana Charles MD  Authorized by: Adriana Charles MD    Consent:     Consent obtained:  Prior to procedure the appropriate consent was completed and verified    Consent given by:  Patient    Procedure risks and benefits discussed: yes      Patient questions answered: yes      Patient agrees, verbalizes understanding, and wants to proceed: yes     Device to be inserted was verified by patient: yes    Educational handouts given: yes      Instructions and paperwork completed: yes    Insertion Procedure:   1 Intra Uterine Device levonorgestreL 21 mcg/24 hours (8 yrs) 52 mg       Pelvic exam performed: yes      Negative GC/chlamydia test: yes      Negative urine pregnancy test: yes      Cervix cleaned and prepped: yes      Speculum placed in vagina: yes      Tenaculum applied to cervix: yes      Uterus sounded: yes      Uterus sound depth (cm):  8    IUD inserted with no complications: yes      IUD type:  Mirena    Strings trimmed: yes (3cm outside of cervical os)    Post-procedure:     Patient tolerated procedure well: yes      Patient will follow up after next period: yes    Comments:      Results for orders placed or performed in visit on 01/16/24  -POCT Urine Pregnancy:        Result                      Value             Ref Range           POC Preg Test, Ur           Negative          Negative             Accept*     Yes

## 2024-05-29 NOTE — TELEPHONE ENCOUNTER
----- Message from Adriana Charles MD sent at 12/16/2020 10:03 PM CST -----  Regarding: IUD  Please call and see how doing after IUD placement and schedule a follow up in 4-8 weeks for string check. Don't see appointment scheduled.    
Pt doing well with IUD, minor spotting. Pt scheduled for f/u string check,  
Imaging Studies/Medications

## 2024-09-30 ENCOUNTER — TELEPHONE (OUTPATIENT)
Dept: OBSTETRICS AND GYNECOLOGY | Facility: CLINIC | Age: 29
End: 2024-09-30

## 2024-09-30 NOTE — TELEPHONE ENCOUNTER
Called pt / pt answered     - informed pt the call is regarding rescheduling her annual / testing appt from today 9/30 to another time that she is avaliable . pt agreed on 12/30 at [facility] FOR 10:45 AM . No further questions or concerns .

## 2024-09-30 NOTE — TELEPHONE ENCOUNTER
Left Message - called pt / pt answered - left pt a message informing pt about aunnal appt for 3:15 pm at [facility] . informed pt to give us a call backor message us via [employer] just incase she would like to schdule it .

## 2024-12-30 ENCOUNTER — OFFICE VISIT (OUTPATIENT)
Dept: OBSTETRICS AND GYNECOLOGY | Facility: CLINIC | Age: 29
End: 2024-12-30
Payer: MEDICAID

## 2024-12-30 VITALS
BODY MASS INDEX: 32.87 KG/M2 | WEIGHT: 197.31 LBS | HEIGHT: 65 IN | SYSTOLIC BLOOD PRESSURE: 122 MMHG | HEART RATE: 84 BPM | DIASTOLIC BLOOD PRESSURE: 67 MMHG

## 2024-12-30 DIAGNOSIS — Z01.419 ENCOUNTER FOR ANNUAL ROUTINE GYNECOLOGICAL EXAMINATION: Primary | ICD-10-CM

## 2024-12-30 DIAGNOSIS — N89.8 VAGINAL ODOR: ICD-10-CM

## 2024-12-30 DIAGNOSIS — R39.89 SUSPECTED UTI: ICD-10-CM

## 2024-12-30 DIAGNOSIS — Z11.3 SCREENING FOR STDS (SEXUALLY TRANSMITTED DISEASES): ICD-10-CM

## 2024-12-30 DIAGNOSIS — Z00.00 HEALTH CARE MAINTENANCE: ICD-10-CM

## 2024-12-30 LAB
BILIRUB SERPL-MCNC: NORMAL MG/DL
BLOOD URINE, POC: 250
CLARITY, POC UA: CLEAR
COLOR, POC UA: NORMAL
GLUCOSE UR QL STRIP: NORMAL
KETONES UR QL STRIP: NORMAL
LEUKOCYTE ESTERASE URINE, POC: NORMAL
NITRITE, POC UA: NORMAL
PH, POC UA: 5
PROTEIN, POC: NORMAL
SPECIFIC GRAVITY, POC UA: 1.02
UROBILINOGEN, POC UA: NORMAL

## 2024-12-30 PROCEDURE — 99999PBSHW POCT URINE DIPSTICK WITHOUT MICROSCOPE: Mod: PBBFAC,,,

## 2024-12-30 PROCEDURE — 99999 PR PBB SHADOW E&M-EST. PATIENT-LVL II: CPT | Mod: PBBFAC,,, | Performed by: OBSTETRICS & GYNECOLOGY

## 2024-12-30 PROCEDURE — 99212 OFFICE O/P EST SF 10 MIN: CPT | Mod: PBBFAC | Performed by: OBSTETRICS & GYNECOLOGY

## 2024-12-30 PROCEDURE — 81002 URINALYSIS NONAUTO W/O SCOPE: CPT | Mod: PBBFAC | Performed by: OBSTETRICS & GYNECOLOGY

## 2024-12-30 PROCEDURE — 0352U VAGINOSIS SCREEN BY DNA PROBE: CPT | Performed by: OBSTETRICS & GYNECOLOGY

## 2024-12-30 PROCEDURE — 87491 CHLMYD TRACH DNA AMP PROBE: CPT | Performed by: OBSTETRICS & GYNECOLOGY

## 2024-12-30 NOTE — PROGRESS NOTES
"  Chief Complaint: Well Woman Exam     HPI:      Yamel Toribio is a 29 y.o.  who presents for annual exam. She is currently complaining of  vaginal odor feeling like may have UTI or vaginitis .    She has had a history of recurrent BV. Not having as frequently in past year as before. Noticed in past few month that she has been having more irregular bleeding with IUD. One new sexual partner in past year and not had STD testing since together.    Ms. Toribio is currently sexually active with a single male partner. She would like STD screening today. Patient does not have regular monthly menses. Patient's last menstrual period was 2024. She is currently using IUD for contraception.     Previous Pap: no abnormalities (2023)    Gardasil:Completed     OB History          4    Para   2    Term   2       0    AB   2    Living   2         SAB   0    IAB   1    Ectopic   0    Multiple        Live Births   2                 GYN History  Age of Menarche:   Age at first pregnancy:    Age at first live birth:   Number of months breastfeeding:    Age at Menopause:    Comments:       ROS:     GENERAL: Denies unintentional weight gain or weight loss. Feeling well overall.   SKIN: Denies rash or lesions.   HEENT: Denies headaches, or vision changes.   CARDIOVASCULAR: Denies palpitations or chest pain.   RESPIRATORY: Denies shortness of breath or dyspnea on exertion.  BREASTS: Denies pain, lumps, or nipple discharge.   ABDOMEN: Denies abdominal pain, constipation, diarrhea, nausea, vomiting, or change in appetite.   URINARY: Denies frequency, dysuria, hematuria.  NEUROLOGIC: Denies syncope or weakness.   PSYCHIATRIC: Denies depression, anxiety or mood swings.    Physical Exam:      PHYSICAL EXAM:  /67 (BP Location: Left arm, Patient Position: Sitting)   Pulse 84   Ht 5' 5" (1.651 m)   Wt 89.5 kg (197 lb 5 oz)   LMP 2024   BMI 32.83 kg/m²   Body mass index is 32.83 kg/m².     APPEARANCE: " Well nourished, well developed, in no acute distress.  PSYCH: Appropriate mood and affect.  SKIN: No acne or hirsutism  NECK: Neck symmetric without masses or thyromegaly  NODES: No inguinal, axillary, or supraclavicular lymph node enlargement  CHEST: Normal respiratory effort.  ABDOMEN: Soft.  No tenderness or masses.   BREASTS: Symmetrical, no skin changes or visible lesions.  No palpable masses or nipple discharge bilaterally.  PELVIC: Normal external genitalia without lesions.  Normal hair distribution.  Adequate perineal body, normal urethral meatus.  Vagina moist and well rugated without lesions or discharge.  Cervix pink, without lesions, discharge or tenderness.  No significant cystocele or rectocele.  Bimanual exam shows uterus to be normal size, regular, mobile and nontender.  Adnexa without masses or tenderness.    EXTREMITIES: No edema.    Results for orders placed or performed in visit on 12/30/24   POCT URINE DIPSTICK WITHOUT MICROSCOPE    Collection Time: 12/30/24  3:18 PM   Result Value Ref Range    Glucose, UA normal     Bilirubin, POC neg     Ketones, UA neg     Spec Grav UA 1.025     Blood,      pH, UA 5     Protein, POC trace     Urobilinogen, UA normal     Nitrite, UA neg     WBC, UA trace     Color, UA Shell     Clarity, UA Clear          Assessment/Plan:     Encounter for annual routine gynecological examination    Suspected UTI  -     POCT URINE DIPSTICK WITHOUT MICROSCOPE: negative    Screening for STDs (sexually transmitted diseases)  -     Hepatitis B Surface Antigen; Future; Expected date: 12/30/2024  -     Hepatitis C Antibody; Future; Expected date: 03/30/2025  -     HIV 1/2 Ag/Ab (4th Gen); Future; Expected date: 12/30/2024  -     Treponema Pallidium Antibodies IgG, IgM; Future; Expected date: 12/30/2024  -     C. trachomatis/N. gonorrhoeae by AMP DNA [facility]; Cervix    Vaginal odor  -     C. trachomatis/N. gonorrhoeae by AMP DNA [facility]; Cervix  -     Vaginosis Screen by DNA  Probe    Health care maintenance  -     CBC Auto Differential; Future; Expected date: 12/30/2024  -     Comprehensive Metabolic Panel; Future; Expected date: 12/30/2024  -     Lipid Panel; Future; Expected date: 12/30/2024  -     TSH; Future; Expected date: 12/30/2024  -     Hemoglobin A1C; Future; Expected date: 12/30/2024        RTC 1 year    Counseling:     Patient was counseled today on current ASCCP pap guidelines, the recommendation for yearly pelvic exams, healthy diet and exercise routines, breast self awareness. She is to see her PCP for other health maintenance.       Use of the [facility] Patient Portal discussed and encouraged during today's visit.       Adriana Chrales MD    As of April 1, 2021, the Cures Act has been passed nationally. This new law requires that all doctors progress notes, lab results, pathology reports and radiology reports be released IMMEDIATELY to the patient in the patient portal. That means that the results are released to you at the EXACT same time they are released to me. Therefore, with all of the patients that I have I am not able to reply to each patient exactly when the results come in. So there will be a delay from when you see the results to when I see them and have time to come up with a response to send you. Also I only see these results when I am on the computer at work. So if the results come in over the weekend or after 5 pm of a work day, I will not see them until the next business day. As you can tell, this is a challenge as a physician to give every patient the quick response they hope for and deserve. So please be patient! Thanks for understanding, Dr. Charles

## 2025-01-01 LAB
BACTERIAL VAGINOSIS DNA: DETECTED
C TRACH DNA SPEC QL NAA+PROBE: NOT DETECTED
CANDIDA GLABRATA/KRUSEI: NOT DETECTED
CANDIDA RRNA VAG QL PROBE: NOT DETECTED
N GONORRHOEA DNA SPEC QL NAA+PROBE: NOT DETECTED
TRICHOMONAS VAGINALIS: NOT DETECTED

## 2025-01-07 DIAGNOSIS — B96.89 BACTERIAL VAGINITIS: Primary | ICD-10-CM

## 2025-01-07 DIAGNOSIS — N76.0 BACTERIAL VAGINITIS: Primary | ICD-10-CM

## 2025-01-07 RX ORDER — METRONIDAZOLE 500 MG/1
500 TABLET ORAL EVERY 12 HOURS
Qty: 10 TABLET | Refills: 0 | Status: SHIPPED | OUTPATIENT
Start: 2025-01-07 | End: 2025-01-12

## 2025-04-23 NOTE — PROGRESS NOTES
----- Message from Iniki sent at 4/23/2025  8:06 AM CDT -----  .Type: Patient Call BackWho called:Patient Feliz is the request in detail:   calling concerning needing to discuss patient results regarding labwork that was done on mondayCan the clinic reply by [facility]?   Would the patient rather a call back or a response via [facility] call back number:  .[phone]   M21 normal. Boy.  Patient notified by portal.

## 2025-06-23 NOTE — LACTATION NOTE
Pt reports infant feeding well, denies pain with latch, reports stool is transitional and breasts feel heavier today. NP note reports short frenulum, oral exam by ADAMS reveals a moderately arched palate, strong, coordinated suck with tongue able to curl around finger- does look suction with resistance. There is a thin, short lingual frenulum that inserts at the mid-position, + extension, + lateralization. Top lip ROM WNL. Discussed with patient and encouraged to talk with Peds if having any issues. Support and encouragement provided.    Lactation discharge education completed. Plan of care is for pt to follow basic breastfeeding education, frequent feeding on demand, and to monitor baby's voids and stools. Breastfeeding guide, including First Alert survey, resource list, and lactation warmline phone number reviewed. Pt to notify doctor for maternal or infant concerns, as reviewed with ADAMS. Pt verbalizes understanding and questions answered.        10/30/20 0900   Maternal Infant Feeding   Latch Assistance   (offered, declined)   Lactation Referrals   Lactation Referrals outpatient lactation program;support group      alert